# Patient Record
Sex: FEMALE | Race: WHITE | NOT HISPANIC OR LATINO | Employment: OTHER | URBAN - METROPOLITAN AREA
[De-identification: names, ages, dates, MRNs, and addresses within clinical notes are randomized per-mention and may not be internally consistent; named-entity substitution may affect disease eponyms.]

---

## 2017-05-17 ENCOUNTER — OFFICE VISIT (OUTPATIENT)
Dept: INTERNAL MEDICINE | Facility: IMAGING CENTER | Age: 72
End: 2017-05-17
Payer: COMMERCIAL

## 2017-05-17 VITALS
HEART RATE: 67 BPM | DIASTOLIC BLOOD PRESSURE: 60 MMHG | HEIGHT: 63 IN | TEMPERATURE: 98.4 F | SYSTOLIC BLOOD PRESSURE: 110 MMHG | RESPIRATION RATE: 14 BRPM | OXYGEN SATURATION: 96 % | WEIGHT: 130 LBS | BODY MASS INDEX: 23.04 KG/M2

## 2017-05-17 DIAGNOSIS — Z78.0 POSTMENOPAUSAL: ICD-10-CM

## 2017-05-17 DIAGNOSIS — M85.89 OSTEOPENIA OF MULTIPLE SITES: ICD-10-CM

## 2017-05-17 DIAGNOSIS — J30.1 SEASONAL ALLERGIC RHINITIS DUE TO POLLEN: ICD-10-CM

## 2017-05-17 DIAGNOSIS — M19.90 ARTHRITIS: ICD-10-CM

## 2017-05-17 DIAGNOSIS — K75.4 AUTOIMMUNE HEPATITIS (HCC): Chronic | ICD-10-CM

## 2017-05-17 DIAGNOSIS — E03.9 ACQUIRED HYPOTHYROIDISM: Chronic | ICD-10-CM

## 2017-05-17 DIAGNOSIS — R53.83 FATIGUE, UNSPECIFIED TYPE: ICD-10-CM

## 2017-05-17 DIAGNOSIS — E55.9 VITAMIN D DEFICIENCY: Chronic | ICD-10-CM

## 2017-05-17 DIAGNOSIS — Z28.39 BEHIND ON IMMUNIZATIONS: ICD-10-CM

## 2017-05-17 DIAGNOSIS — Z28.03: ICD-10-CM

## 2017-05-17 PROCEDURE — 99205 OFFICE O/P NEW HI 60 MIN: CPT | Performed by: FAMILY MEDICINE

## 2017-05-17 RX ORDER — ESTRADIOL 1 MG/1
1 TABLET ORAL DAILY
COMMUNITY
End: 2017-05-17 | Stop reason: SDUPTHER

## 2017-05-17 RX ORDER — ESTRADIOL 1 MG/1
1 TABLET ORAL DAILY
Qty: 30 TAB | Refills: 1 | Status: SHIPPED | OUTPATIENT
Start: 2017-05-17 | End: 2018-05-10 | Stop reason: SDUPTHER

## 2017-05-17 RX ORDER — MONTELUKAST SODIUM 10 MG/1
10 TABLET ORAL DAILY
Qty: 90 TAB | Refills: 4 | Status: SHIPPED | OUTPATIENT
Start: 2017-05-17 | End: 2018-05-10 | Stop reason: SDUPTHER

## 2017-05-17 RX ORDER — ESTRADIOL 1 MG/1
1 TABLET ORAL DAILY
Qty: 90 TAB | Refills: 4 | Status: SHIPPED | OUTPATIENT
Start: 2017-05-17 | End: 2017-05-17 | Stop reason: SDUPTHER

## 2017-05-17 NOTE — MR AVS SNAPSHOT
"        Angela Davis   2017 11:00 AM   Office Visit   MRN: [id number]    Department:  [facility]   Dept Phone:  [phone]    Description:  Female : 1945   Provider:  Enedelia Bourne M.D.           Reason for Visit     Establish Care           Allergies as of 2017     Allergen Noted Reactions    Statins [Hmg-Coa-R Inhibitors] 2008       Autoimmune hepatitis    Codeine 2013       Hydrocodone 2013       Librium 2013       Lopressor [Kdc:Ci Pigment Blue 63+Metoprolol] 2015       Does not tolerate beta blockers    Sulphadimidine Sodium [Sulfamethazine Sodium] 2013         You were diagnosed with     Osteopenia of multiple sites   [9977526]       Vitamin D deficiency   [9320804]       Acquired hypothyroidism   [7114622]       Seasonal allergic rhinitis due to pollen   [9165964]       Behind on immunizations   [830889]       Fatigue, unspecified type   [4556756]         Vital Signs     Blood Pressure Pulse Temperature Respirations Height Weight    110/60 mmHg 67 36.9 °C (98.4 °F) 14 1.6 m (5' 3\") 58.968 kg (130 lb)    Body Mass Index Oxygen Saturation Smoking Status             23.03 kg/m2 96% Never Smoker          Basic Information     Date Of Birth Sex Race Ethnicity Preferred Language    1945 Female White Non- English      Your appointments     2017 10:00 AM   FOLLOW UP with Dayami Mtz M.D.   [facility] (--)    [address] [place] [address] [address]  [facility] [id number] [phone]              Problem List              ICD-10-CM Priority Class Noted - Resolved    Paroxysmal supraventricular tachycardia, details unknow - dx 3-5 y ago I47.1   2013 - Present    Other chest pain R07.89   2013 - Present    Autoimmune hepatitis (CMS-HCC) K75.4   10/10/2014 - Present    Mitral regurgitation I34.0   10/10/2014 - Present    Osteopenia of multiple sites M85.89   2017 - Present   " Vitamin D deficiency (Chronic) E55.9   5/17/2017 - Present    Acquired hypothyroidism (Chronic) E03.9   5/17/2017 - Present    Seasonal allergic rhinitis due to pollen J30.1   5/17/2017 - Present      Health Maintenance        Date Due Completion Dates    IMM DTaP/Tdap/Td Vaccine (1 - Tdap) 9/13/1965 ---    PAP SMEAR 9/13/1967 ---    COLONOSCOPY 9/13/1996 ---    IMM ZOSTER VACCINE 9/13/2006 ---    BONE DENSITY 9/13/2011 6/17/2005    IMM PNEUMOCOCCAL 65+ (ADULT) LOW/MEDIUM RISK SERIES (2 of 2 - PPSV23) 9/16/2016 9/16/2015    MAMMOGRAM 7/18/2017 7/18/2016, 1/30/2007, 1/30/2007, 12/7/2005            Current Immunizations     13-VALENT PCV PREVNAR 9/16/2015    Influenza Vaccine Adult HD 10/12/2016      Below and/or attached are the medications your provider expects you to take. Review all of your home medications and newly ordered medications with your provider and/or pharmacist. Follow medication instructions as directed by your provider and/or pharmacist. Please keep your medication list with you and share with your provider. Update the information when medications are discontinued, doses are changed, or new medications (including over-the-counter products) are added; and carry medication information at all times in the event of emergency situations     Allergies:  STATINS - (reactions not documented)     CODEINE - (reactions not documented)     HYDROCODONE - (reactions not documented)     LIBRIUM - (reactions not documented)     LOPRESSOR - (reactions not documented)     SULPHADIMIDINE SODIUM - (reactions not documented)               Medications  Valid as of: May 17, 2017 - 11:57 AM    Generic Name Brand Name Tablet Size Instructions for use    Aspirin (Tablet Delayed Response) ECOTRIN 81 MG Take 81 mg by mouth every day.        AzaTHIOprine (Tab) Azathioprine 75 MG Take  by mouth every day.        AzaTHIOprine (Tab) IMURAN 50 MG         Calcium Carb-Cholecalciferol (Tab) Calcium Carb-Cholecalciferol 1000-800  MG-UNIT Take 1 Tab by mouth every day.        Diclofenac Sodium (Gel) VOLTAREN 1 %         Estriol Micronized   1 mg by Does not apply route.        Fexofenadine HCl (Tab) ALLEGRA 180 MG Take 180 mg by mouth every day.        Levothyroxine Sodium (Tab) SYNTHROID 125 MCG Take 125 mcg by mouth every day.        Lisinopril (Tab) PRINIVIL 5 MG TAKE 1 TAB BY MOUTH EVERY EVENING.        Montelukast Sodium (Tab) SINGULAIR 10 MG Take 10 mg by mouth every day.        Omega-3 Fatty Acids (Cap) OMEGA 3 FA 1000 MG Take 1,000 mg by mouth 2 Times a Day.        .                 Medicines prescribed today were sent to:     [id number] - [place], [place] - [address]    [address] [place] [address]    Phone: [phone] Fax: [phone]    Open 24 Hours?: No      Medication refill instructions:       If your prescription bottle indicates you have medication refills left, it is not necessary to call your provider’s office. Please contact your pharmacy and they will refill your medication.    If your prescription bottle indicates you do not have any refills left, you may request refills at any time through one of the following ways: The online SongFlame system (except Urgent Care), by calling your provider’s office, or by asking your pharmacy to contact your provider’s office with a refill request. Medication refills are processed only during regular business hours and may not be available until the next business day. Your provider may request additional information or to have a follow-up visit with you prior to refilling your medication.   *Please Note: Medication refills are assigned a new Rx number when refilled electronically. Your pharmacy may indicate that no refills were authorized even though a new prescription for the same medication is available at the pharmacy. Please request the medicine by name with the pharmacy before contacting your provider for a refill.        Your To Do List     Future  "Labs/Procedures Complete By Expires    IONIZED CALCIUM  As directed 5/17/2018    MAGNESIUM  As directed 5/17/2018    T3 FREE  As directed 5/17/2018    TSH WITH REFLEX TO FT4  As directed 5/17/2018    VITAMIN B12  As directed 5/17/2018    VITAMIN D,25 HYDROXY  As directed 5/17/2018      Other Notes About Your Plan     Goes by \"She\"  2008 - Autoimmune hepatitis dx secondary to statin therapy.           [employer] Access Code: Activation code not generated  Current Corvisa[employer] Status: Active          "

## 2017-05-17 NOTE — PROGRESS NOTES
Chief Complaint   Patient presents with   • Establish Care       HPI:  Patient is a 71 y.o. female established patient who presents today to [facility]. She was previously cared for by Dr. Giovanny Rodriguez for her primary care needs. Pt has chronic autoimmune hepatitis induced by 3 months of statin therapy diagnosed in 2008/ followed by Dr. Owen/ on chronic imuran treatment with CBC/CMP/Hepatic panel lab draw pending. Pt has chronic mitral valve regurgitation/rate SVT followed by Dr. GREG Mtz. She also has chronic osteopenia (last dexa done in Dr. Rodriguez's office 2-3 yrs ago) and chronic hypothyroidism (last TSH 0.317) 6/9/16 with dosage change made at that time but no repeat labs done. Pt cannot call the last time she has had labs drawn for Vitamin D/B12/Mg. She no longer has lipid panels done due to her intolerance to statin therapy (familial CAD/hyperlipidemia hx). She is due for her mammogram at [facility] in 7/17 and colonoscopy is UTD. Pt needs Tdap but reports completing pneumonia vaccines in the past. She is aware of her yearly Medicare annual wellness visit benefit but finds no value to it. She reports that she constantly feels cold/has low energy/convinced it is related to her thyroid disorder not being well controlled.  Pt is here with her  at today's visit, and he supports her concerns.     Patient Active Problem List    Diagnosis Date Noted   • Osteopenia of multiple sites 05/17/2017   • Vitamin D deficiency 05/17/2017   • Acquired hypothyroidism 05/17/2017   • Seasonal allergic rhinitis due to pollen 05/17/2017   • Immunization not carried out because of immune compromised state 05/17/2017   • Autoimmune hepatitis (CMS-HCC) 10/10/2014   • Mitral regurgitation 10/10/2014   • Paroxysmal supraventricular tachycardia, details unknow - dx 3-5 y ago 04/19/2013     Past medical, surgical, family, and social history was reviewed and updated in Epic chart by me today.     Medications and allergies reviewed  "and updated in Epic chart by me today.     All previous records provided have been reviewed today.     ROS:  Pertinent positives listed above in HPI. All other systems have been reviewed and are negative.    PE:   /60 mmHg  Pulse 67  Temp(Src) 36.9 °C (98.4 °F)  Resp 14  Ht 1.6 m (5' 3\")  Wt 58.968 kg (130 lb)  BMI 23.03 kg/m2  SpO2 96%  Vital signs reviewed with patient.     Gen: Well developed; well nourished; no acute distress; age appropriate appearance   HEENT: Normocephalic; atraumatic; PEERLA b/l; sclera clear b/l; b/l external auditory canals WNL;R cerumen noted; b/l TM WNL; oropharynx clear; oral mucosa moist; tongue midline; dentition adequate   Neck: No adenopathy; no thyromegaly  CV: Regular rate and rhythm; S1/ S2 present; no murmur, gallop or rub noted  Pulm: No respiratory distress; clear to ascultation b/l; no wheezing or stridor noted b/l  Abd: Adequate bowel sounds noted; soft and nontender; no rebound, rigidity, nor distention  Extremities: No peripheral edema b/l LE extremities/ no clubbing nor cyanosis noted  Skin: Warm and dry; no rashes noted   Neuro: No focal deficits noted   Psych: AAOx4; mood and affect are appropriate    A/P:  1. Osteopenia of multiple sites  Pt reports having Dexa scan within the last 2-3 years at Dr. Rodriguez's office - will obtain records from them.    2. Chronic vitamin D deficiency  Stability unknown/ pt takes daily vitamin D supplementation/ will check baseline labs  - VITAMIN D,25 HYDROXY; Future  - IONIZED CALCIUM; Future    3. Chronic acquired hypothyroidism  Pt remains symptomatic despite taking Synthroid daily/ will check baseline labs  - TSH WITH REFLEX TO FT4; Future  - T3 FREE; Future    4. Seasonal allergic rhinitis due to pollen  Stable/ well controlled on daily singulair - refill requested through mail order  - montelukast (SINGULAIR) 10 MG Tab; Take 1 Tab by mouth every day. Indications: Hayfever  Dispense: 90 Tab; Refill: 4    5. Behind on " immunizations  Tdap due - RX given for pt to obtain at pharmacy due to insurance    6. Fatigue, unspecified type  Unknown etiology - will check mg and B12 levels  - MAGNESIUM; Future  - VITAMIN B12; Future    7. Chronic autoimmune hepatitis (CMS-HCC)  Stable/ pt on daily imuran tx/ followed closely by Dr. Owen - labs pending from his office    8. Immunization not carried out because of immune compromised state  Pt is not candidate for zoster vaccine    9. Postmenopausal  Stable/ pt to continue daily estrace - refill requested both through mail order and local pharmacy  - estradiol (ESTRACE) 1 MG Tab; Take 1 Tab by mouth every day.  Dispense: 30 Tab; Refill: 1    10. Chronic osteoarthritis  Stable/ controlled with BID diclofenac therapy - refill requested through mail order  - Diclofenac Sodium 1 % Gel; Apply to affected areas of skin BID PRN arthritis pain  Dispense: 600 g; Refill: 4    11. Health Care Maintenance  Pt is to call C for mammogram due after 7/18/17.     Face to face time: 65 minutes with greater than 50% of time spent with direct medical care and counseling  Pt is to be seen for Medicare exam before the end of the year/ PRN sooner if current condition changes. I will f/u with her regarding lab results when available.

## 2017-05-27 LAB
25(OH)D3+25(OH)D2 SERPL-MCNC: 44.5 NG/ML (ref 30–100)
CA-I SERPL ISE-MCNC: 5.1 MG/DL (ref 4.5–5.6)
CALCIUM SERPL-MCNC: 9.9 MG/DL (ref 8.7–10.3)
MAGNESIUM SERPL-MCNC: 2.4 MG/DL (ref 1.6–2.3)
T3FREE SERPL-MCNC: 3 PG/ML (ref 2–4.4)
T4 FREE SERPL-MCNC: 1.67 NG/DL (ref 0.82–1.77)
TSH SERPL DL<=0.005 MIU/L-ACNC: 2.53 UIU/ML (ref 0.45–4.5)
VIT B12 SERPL-MCNC: 173 PG/ML (ref 211–946)

## 2017-05-30 ENCOUNTER — NON-PROVIDER VISIT (OUTPATIENT)
Dept: INTERNAL MEDICINE | Facility: IMAGING CENTER | Age: 72
End: 2017-05-30
Payer: COMMERCIAL

## 2017-05-30 DIAGNOSIS — E53.8 B12 DEFICIENCY: ICD-10-CM

## 2017-05-30 PROCEDURE — 96372 THER/PROPH/DIAG INJ SC/IM: CPT | Performed by: FAMILY MEDICINE

## 2017-05-30 RX ORDER — CYANOCOBALAMIN 1000 UG/ML
1000 INJECTION, SOLUTION INTRAMUSCULAR; SUBCUTANEOUS ONCE
Status: COMPLETED | OUTPATIENT
Start: 2017-06-02 | End: 2017-06-01

## 2017-05-30 RX ORDER — CYANOCOBALAMIN 1000 UG/ML
1000 INJECTION, SOLUTION INTRAMUSCULAR; SUBCUTANEOUS ONCE
Status: COMPLETED | OUTPATIENT
Start: 2017-05-30 | End: 2017-05-30

## 2017-05-30 RX ADMIN — CYANOCOBALAMIN 1000 MCG: 1000 INJECTION, SOLUTION INTRAMUSCULAR; SUBCUTANEOUS at 14:22

## 2017-06-01 ENCOUNTER — NON-PROVIDER VISIT (OUTPATIENT)
Dept: INTERNAL MEDICINE | Facility: IMAGING CENTER | Age: 72
End: 2017-06-01
Payer: COMMERCIAL

## 2017-06-01 DIAGNOSIS — E53.8 B12 DEFICIENCY: ICD-10-CM

## 2017-06-01 PROCEDURE — 96372 THER/PROPH/DIAG INJ SC/IM: CPT | Performed by: FAMILY MEDICINE

## 2017-06-01 RX ADMIN — CYANOCOBALAMIN 1000 MCG: 1000 INJECTION, SOLUTION INTRAMUSCULAR; SUBCUTANEOUS at 13:46

## 2017-06-05 ENCOUNTER — OFFICE VISIT (OUTPATIENT)
Dept: CARDIOLOGY | Facility: MEDICAL CENTER | Age: 72
End: 2017-06-05
Payer: COMMERCIAL

## 2017-06-05 VITALS
OXYGEN SATURATION: 93 % | SYSTOLIC BLOOD PRESSURE: 116 MMHG | WEIGHT: 131 LBS | BODY MASS INDEX: 23.21 KG/M2 | HEART RATE: 66 BPM | HEIGHT: 63 IN | DIASTOLIC BLOOD PRESSURE: 68 MMHG

## 2017-06-05 DIAGNOSIS — I47.10 PAROXYSMAL SUPRAVENTRICULAR TACHYCARDIA: Chronic | ICD-10-CM

## 2017-06-05 DIAGNOSIS — I34.0 NON-RHEUMATIC MITRAL REGURGITATION: Chronic | ICD-10-CM

## 2017-06-05 PROCEDURE — 99214 OFFICE O/P EST MOD 30 MIN: CPT | Performed by: INTERNAL MEDICINE

## 2017-06-05 ASSESSMENT — ENCOUNTER SYMPTOMS
RESPIRATORY NEGATIVE: 1
SHORTNESS OF BREATH: 0
PALPITATIONS: 0
DIZZINESS: 0
BRUISES/BLEEDS EASILY: 0
WEIGHT LOSS: 0
NERVOUS/ANXIOUS: 0
NAUSEA: 0
HEARTBURN: 0
EYES NEGATIVE: 1
LOSS OF CONSCIOUSNESS: 0
INSOMNIA: 0
MUSCULOSKELETAL NEGATIVE: 1
COUGH: 0

## 2017-06-05 NOTE — PROGRESS NOTES
Subjective:   Angela Davis is a 71 y.o. female who presents today in follow-up in regards to her mitral valve disease and remote SVT    Worries about her , he has heart failure and coronary disease. Recent stenting    Healthy and active but still gets chest heaviness with anxiety. This comes and goes, is mild but worse with the stress she has been under. It is not exertional or positional. Locations are very compliant    She has had good cholesterol never been on a statin with her autoimmune hepatitis  No palpitations    Past Medical History   Diagnosis Date   • Paroxysmal supraventricular tachycardia, details unknow - dx 3-5 y ago 4/19/2013   • Other chest pain 4/19/2013   • Fatigue 5/22/2013   • Congenital malrotation of intestine      Past Surgical History   Procedure Laterality Date   • Abdominal hysterectomy total  1970   • Appendectomy  1950   • Tonsillectomy  1954   • Lumpectomy  1982     right breast   • Hernia repair  1970     umbilical   • Primary c section  1968/1970   • Other abdominal surgery  1965-66     congenital malrotation of intestines     Family History   Problem Relation Age of Onset   • Heart Disease Mother    • Cancer Mother    • Heart Disease Father    • Cancer Father    • Heart Disease Sister    • Heart Attack Sister    • Heart Disease Brother    • Heart Attack Brother    • Heart Disease Brother    • Heart Attack Brother    • Diabetes Child    • Psychiatry Child      hodgkins survivor     History   Smoking status   • Never Smoker    Smokeless tobacco   • Never Used     Allergies   Allergen Reactions   • Statins [Hmg-Coa-R Inhibitors]      Autoimmune hepatitis   • Codeine    • Hydrocodone    • Librium    • Lopressor [Kdc:Ci Pigment Blue 63+Metoprolol]      Does not tolerate beta blockers   • Sulphadimidine Sodium [Sulfamethazine Sodium]      Outpatient Encounter Prescriptions as of 6/5/2017   Medication Sig Dispense Refill   • cyanocobalamin (VITAMIN B12) 1000 MCG Tab Take 1  "Tab by mouth every day. 30 Tab 6   • Diclofenac Sodium 1 % Gel Apply to affected areas of skin BID PRN arthritis pain 600 g 4   • montelukast (SINGULAIR) 10 MG Tab Take 1 Tab by mouth every day. Indications: Hayfever 90 Tab 4   • estradiol (ESTRACE) 1 MG Tab Take 1 Tab by mouth every day. 30 Tab 1   • lisinopril (PRINIVIL) 5 MG Tab TAKE 1 TAB BY MOUTH EVERY EVENING. 90 Tab 3   • Azathioprine 75 MG TABS Take 75 mg by mouth every day.     • fexofenadine (ALLEGRA) 180 MG tablet Take 180 mg by mouth every day. Indications: Hayfever     • aspirin EC (ECOTRIN) 81 MG TBEC Take 81 mg by mouth every day.     • Calcium Carb-Cholecalciferol (CALCIUM 1000 + D) 1000-800 MG-UNIT TABS Take 1 Tab by mouth every day.     • docosahexanoic acid (OMEGA 3 FA) 1000 MG CAPS Take 1,000 mg by mouth 2 Times a Day.     • levothyroxine (SYNTHROID) 125 MCG TABS Take 125 mcg by mouth every day.       No facility-administered encounter medications on file as of 6/5/2017.     Review of Systems   Constitutional: Negative for weight loss and malaise/fatigue.   Eyes: Negative.    Respiratory: Negative.  Negative for cough and shortness of breath.    Cardiovascular: Negative for palpitations and leg swelling.   Gastrointestinal: Negative for heartburn and nausea.   Musculoskeletal: Negative.    Neurological: Negative for dizziness and loss of consciousness.   Endo/Heme/Allergies: Does not bruise/bleed easily.   Psychiatric/Behavioral: The patient is not nervous/anxious and does not have insomnia.    All other systems reviewed and are negative.       Objective:   /68 mmHg  Pulse 66  Ht 1.6 m (5' 3\")  Wt 59.421 kg (131 lb)  BMI 23.21 kg/m2  SpO2 93%    Physical Exam   Constitutional: She is oriented to person, place, and time. She appears well-developed and well-nourished. No distress.   Eyes: EOM are normal. Pupils are equal, round, and reactive to light.   Neck: Neck supple. No JVD present. No thyroid mass and no thyromegaly present. "   Cardiovascular: Normal rate, regular rhythm, S1 normal, S2 normal and normal pulses.  PMI is not displaced.    Murmur (1 out of 6, at the apex, systolic blowing) heard.  Pulses:       Carotid pulses are 2+ on the right side, and 2+ on the left side.       Radial pulses are 2+ on the right side, and 2+ on the left side.   No peripheral edema.   Pulmonary/Chest: Effort normal and breath sounds normal. No respiratory distress.   Abdominal: Normal appearance and bowel sounds are normal. She exhibits no abdominal bruit. There is no hepatosplenomegaly.   Musculoskeletal: Normal range of motion. She exhibits no edema or tenderness.        Lumbar back: She exhibits no tenderness and no spasm.   Neurological: She is alert and oriented to person, place, and time. She has normal strength. No cranial nerve deficit. She exhibits normal muscle tone.   Skin: Skin is warm and dry. No rash noted. No cyanosis. Nails show no clubbing.   Psychiatric: She has a normal mood and affect. Her behavior is normal.       Assessment:     1. Paroxysmal supraventricular tachycardia, details unknow - dx 3-5 y ago  Echocardiogram Comp w/o Cont   2. Non-rheumatic mitral regurgitation  Echo-Rest/Stress w/o Contrast    Echocardiogram Comp w/o Cont       Medical Decision Making:  Today's Assessment / Status / Plan:     Valvular heart disease, mitral valve reviewed, echo 2015. Stable. Were due to repeat this, I do not think she is symptomatically this. Highly functional    Chest pain, this does concern me. Stress echo in 2015 for similar reasons was normal and reassuring. We talked at length about repeating this, if it is high risk for she's continuously symptomatic we talked about coronary angiography and possible percutaneous intervention afforded. She voices understanding. Again she does not take statins    Lipids reviewed, she follows this with a new primary care doctor. She feels that she is in very good hands. Blood pressure is excellent, good  afterload reduction with her ACE inhibitor    RTC 6 months or sooner based on testing

## 2017-06-05 NOTE — MR AVS SNAPSHOT
"        Angela Conwayarvind   2017 10:00 AM   Office Visit   MRN: [id number]    Department:  [facility]   Dept Phone:  [phone]    Description:  Female : 1945   Provider:  Dayami Mtz M.D.           Reason for Visit     Follow-Up           Allergies as of 2017     Allergen Noted Reactions    Statins [Hmg-Coa-R Inhibitors] 2008       Autoimmune hepatitis    Codeine 2013       Hydrocodone 2013       Librium 2013       Lopressor [Kdc:Ci Pigment Blue 63+Metoprolol] 2015       Does not tolerate beta blockers    Sulphadimidine Sodium [Sulfamethazine Sodium] 2013         You were diagnosed with     Paroxysmal supraventricular tachycardia (CMS-HCC)   [427.0.ICD-9-CM]       Non-rheumatic mitral regurgitation   [624113]         Vital Signs     Blood Pressure Pulse Height Weight Body Mass Index Oxygen Saturation    116/68 mmHg 66 1.6 m (5' 3\") 59.421 kg (131 lb) 23.21 kg/m2 93%    Smoking Status                   Never Smoker            Basic Information     Date Of Birth Sex Race Ethnicity Preferred Language    1945 Female White Non- English      Problem List              ICD-10-CM Priority Class Noted - Resolved    Paroxysmal supraventricular tachycardia, details unknow - dx 3-5 y ago (Chronic) I47.1   2013 - Present    Autoimmune hepatitis (CMS-HCC) (Chronic) K75.4   10/10/2014 - Present    Mitral regurgitation (Chronic) I34.0   10/10/2014 - Present    Osteopenia of multiple sites (Chronic) M85.89   2017 - Present    Vitamin D deficiency (Chronic) E55.9   2017 - Present    Acquired hypothyroidism (Chronic) E03.9   2017 - Present    Seasonal allergic rhinitis due to pollen (Chronic) J30.1   2017 - Present    Immunization not carried out because of immune compromised state Z28.03   2017 - Present    B12 deficiency E53.8   2017 - Present      Health Maintenance        Date Due Completion Dates    IMM " DTaP/Tdap/Td Vaccine (1 - Tdap) 9/13/1965 ---    BONE DENSITY 9/13/2011 6/17/2005    IMM ZOSTER VACCINE 5/1/2020 (Originally 9/13/2006) ---    MAMMOGRAM 7/18/2017 7/18/2016, 1/30/2007, 1/30/2007, 12/7/2005    COLONOSCOPY 2/17/2021 2/17/2016            Current Immunizations     13-VALENT PCV PREVNAR 9/16/2015    Influenza Vaccine Adult HD 10/12/2016    Pneumococcal polysaccharide vaccine (PPSV-23) 5/1/2014      Below and/or attached are the medications your provider expects you to take. Review all of your home medications and newly ordered medications with your provider and/or pharmacist. Follow medication instructions as directed by your provider and/or pharmacist. Please keep your medication list with you and share with your provider. Update the information when medications are discontinued, doses are changed, or new medications (including over-the-counter products) are added; and carry medication information at all times in the event of emergency situations     Allergies:  STATINS - (reactions not documented)     CODEINE - (reactions not documented)     HYDROCODONE - (reactions not documented)     LIBRIUM - (reactions not documented)     LOPRESSOR - (reactions not documented)     SULPHADIMIDINE SODIUM - (reactions not documented)               Medications  Valid as of: June 05, 2017 - 10:23 AM    Generic Name Brand Name Tablet Size Instructions for use    Aspirin (Tablet Delayed Response) ECOTRIN 81 MG Take 81 mg by mouth every day.        AzaTHIOprine (Tab) Azathioprine 75 MG Take 75 mg by mouth every day.        Calcium Carb-Cholecalciferol (Tab) Calcium Carb-Cholecalciferol 1000-800 MG-UNIT Take 1 Tab by mouth every day.        Cyanocobalamin (Tab) VITAMIN B12 1000 MCG Take 1 Tab by mouth every day.        Diclofenac Sodium (Gel) Diclofenac Sodium 1 % Apply to affected areas of skin BID PRN arthritis pain        Estradiol (Tab) ESTRACE 1 MG Take 1 Tab by mouth every day.        Fexofenadine HCl (Tab) ALLEGRA  180 MG Take 180 mg by mouth every day. Indications: Hayfever        Levothyroxine Sodium (Tab) SYNTHROID 125 MCG Take 125 mcg by mouth every day.        Lisinopril (Tab) PRINIVIL 5 MG TAKE 1 TAB BY MOUTH EVERY EVENING.        Montelukast Sodium (Tab) SINGULAIR 10 MG Take 1 Tab by mouth every day. Indications: Hayfever        Omega-3 Fatty Acids (Cap) OMEGA 3 FA 1000 MG Take 1,000 mg by mouth 2 Times a Day.        .                 Medicines prescribed today were sent to:     [id number] - [place], [place] - [address] [facility] [place], [place]    [address] [facility] [place] [address]    Phone: [phone] Fax: [phone]    Open 24 Hours?: No    [facility] - [place], [place] - [address] AT [address]AL TO [facility] [place]    [address] [facility] [address]    Phone: [phone] Fax: [phone]    Open 24 Hours?: No      Medication refill instructions:       If your prescription bottle indicates you have medication refills left, it is not necessary to call your provider’s office. Please contact your pharmacy and they will refill your medication.    If your prescription bottle indicates you do not have any refills left, you may request refills at any time through one of the following ways: The online YouHelp system (except Urgent Care), by calling your provider’s office, or by asking your pharmacy to contact your provider’s office with a refill request. Medication refills are processed only during regular business hours and may not be available until the next business day. Your provider may request additional information or to have a follow-up visit with you prior to refilling your medication.   *Please Note: Medication refills are assigned a new Rx number when refilled electronically. Your pharmacy may indicate that no refills were authorized even though a new prescription for the same medication is available at the pharmacy. Please request the medicine by name with the pharmacy before contacting your  "provider for a refill.        Your To Do List     Future Labs/Procedures Complete By Expires    Echo-Rest/Stress w/o Contrast  As directed 6/5/2018    Echocardiogram Comp w/o Cont  As directed 6/5/2018      Other Notes About Your Plan     [facility] - Synthroid RX instructions scanned into media 5/17/17  Goes by \"She\"  2008 - Autoimmune hepatitis dx secondary to statin therapy - NO LIPID PANEL  Cardiology: Dr. GREG Mtz  GI: Dr. Brayden Oates Access Code: Activation code not generated  Current [employer] Status: Active          "

## 2017-07-25 ENCOUNTER — TELEPHONE (OUTPATIENT)
Dept: INTERNAL MEDICINE | Facility: IMAGING CENTER | Age: 72
End: 2017-07-25

## 2017-07-25 DIAGNOSIS — N63.10 BREAST MASS, RIGHT: ICD-10-CM

## 2017-07-25 NOTE — TELEPHONE ENCOUNTER
Spoke with patient on telephone regarding abnormal screening mammogram results. Will order diagnostic mammogram and R breast US at [facility].

## 2017-07-27 ENCOUNTER — HOSPITAL ENCOUNTER (OUTPATIENT)
Dept: CARDIOLOGY | Facility: MEDICAL CENTER | Age: 72
End: 2017-07-27
Attending: INTERNAL MEDICINE | Admitting: INTERNAL MEDICINE
Payer: COMMERCIAL

## 2017-07-27 ENCOUNTER — HOSPITAL ENCOUNTER (OUTPATIENT)
Dept: CARDIOLOGY | Facility: MEDICAL CENTER | Age: 72
End: 2017-07-27
Attending: INTERNAL MEDICINE
Payer: COMMERCIAL

## 2017-07-27 DIAGNOSIS — I34.0 NON-RHEUMATIC MITRAL REGURGITATION: ICD-10-CM

## 2017-07-27 DIAGNOSIS — I47.10 PAROXYSMAL SUPRAVENTRICULAR TACHYCARDIA: Chronic | ICD-10-CM

## 2017-07-27 DIAGNOSIS — I34.0 NON-RHEUMATIC MITRAL REGURGITATION: Chronic | ICD-10-CM

## 2017-07-27 LAB — LV EJECT FRACT  99904: 70

## 2017-07-27 PROCEDURE — 93306 TTE W/DOPPLER COMPLETE: CPT | Mod: 26,59 | Performed by: INTERNAL MEDICINE

## 2017-07-27 PROCEDURE — 93017 CV STRESS TEST TRACING ONLY: CPT

## 2017-07-27 PROCEDURE — 93018 CV STRESS TEST I&R ONLY: CPT | Performed by: INTERNAL MEDICINE

## 2017-07-27 PROCEDURE — 93350 STRESS TTE ONLY: CPT

## 2017-07-27 PROCEDURE — 93350 STRESS TTE ONLY: CPT | Mod: 26 | Performed by: INTERNAL MEDICINE

## 2017-07-27 PROCEDURE — 93306 TTE W/DOPPLER COMPLETE: CPT

## 2017-07-28 ENCOUNTER — TELEPHONE (OUTPATIENT)
Dept: CARDIOLOGY | Facility: MEDICAL CENTER | Age: 72
End: 2017-07-28

## 2017-07-28 LAB
LV EJECT FRACT  99904: 70
LV EJECT FRACT MOD 2C 99903: 83.8
LV EJECT FRACT MOD 4C 99902: 83.04
LV EJECT FRACT MOD BP 99901: 83.18

## 2017-07-29 NOTE — TELEPHONE ENCOUNTER
Called patient, advised her of her echocardiogram and stress echocardiogram results.    JNS RN      ==============================================================  Stress Echo Report    Echocardiography Laboratory  CONCLUSIONS  Negative stress echocardiogram for ischemia with adequate stress   achieved by heart rate criteria.   Excellent exercise tolerance, achieving 10.1 METS.    ERIC PARMAR  Age:    71    Gender:    F  MRN:    [id number]  :    1945  Exam Date:           2017  ==============================================================     Transthoracic  Echo Report      Echocardiography Laboratory    CONCLUSIONS  Prior echo 4-15-16. Compared to the images of the prior study done -    there has been no significant change.   Normal left ventricular systolic function.  Left ventricular ejection fraction is visually estimated to be 70%.  Grade I diastolic dysfunction.  Mild to moderate mitral regurgitation.  Mild aortic insufficiency.  Mild tricuspid regurgitation.  Estimated right ventricular systolic pressure is 35 mmHg.    ERIC PARMAR  Exam Date:         2017  ===============================================================

## 2017-08-23 ENCOUNTER — OFFICE VISIT (OUTPATIENT)
Dept: INTERNAL MEDICINE | Facility: IMAGING CENTER | Age: 72
End: 2017-08-23
Payer: COMMERCIAL

## 2017-08-23 VITALS
TEMPERATURE: 97.9 F | RESPIRATION RATE: 14 BRPM | SYSTOLIC BLOOD PRESSURE: 100 MMHG | HEIGHT: 64 IN | OXYGEN SATURATION: 97 % | HEART RATE: 71 BPM | WEIGHT: 130 LBS | BODY MASS INDEX: 22.2 KG/M2 | DIASTOLIC BLOOD PRESSURE: 60 MMHG

## 2017-08-23 DIAGNOSIS — E53.8 B12 DEFICIENCY: ICD-10-CM

## 2017-08-23 DIAGNOSIS — E55.9 VITAMIN D DEFICIENCY: Chronic | ICD-10-CM

## 2017-08-23 DIAGNOSIS — E28.39 ESTROGEN DEFICIENCY: ICD-10-CM

## 2017-08-23 DIAGNOSIS — I34.0 MITRAL VALVE INSUFFICIENCY, UNSPECIFIED ETIOLOGY: ICD-10-CM

## 2017-08-23 DIAGNOSIS — K75.4 AUTOIMMUNE HEPATITIS (HCC): Chronic | ICD-10-CM

## 2017-08-23 DIAGNOSIS — M85.89 OSTEOPENIA OF MULTIPLE SITES: Chronic | ICD-10-CM

## 2017-08-23 DIAGNOSIS — Z00.00 MEDICARE ANNUAL WELLNESS VISIT, SUBSEQUENT: ICD-10-CM

## 2017-08-23 DIAGNOSIS — E03.9 ACQUIRED HYPOTHYROIDISM: Chronic | ICD-10-CM

## 2017-08-23 DIAGNOSIS — Z28.39 BEHIND ON IMMUNIZATIONS: ICD-10-CM

## 2017-08-23 PROCEDURE — G0439 PPPS, SUBSEQ VISIT: HCPCS | Performed by: FAMILY MEDICINE

## 2017-08-23 RX ORDER — LISINOPRIL 5 MG/1
5 TABLET ORAL
Qty: 90 TAB | Refills: 3 | Status: SHIPPED | OUTPATIENT
Start: 2017-08-23 | End: 2018-02-12 | Stop reason: SDUPTHER

## 2017-08-23 RX ORDER — ACETAMINOPHEN 160 MG
1 TABLET,DISINTEGRATING ORAL DAILY
COMMUNITY
End: 2020-03-02

## 2017-08-23 ASSESSMENT — PATIENT HEALTH QUESTIONNAIRE - PHQ9: CLINICAL INTERPRETATION OF PHQ2 SCORE: 0

## 2017-08-23 NOTE — PROGRESS NOTES
CC:   Medicare Annual Wellness Visit    HPI:  Angela is a 71 y.o. Female here for Medicare Annual Wellness Visit with her  present today. She is doing well overall and has been focused on keeping him healthy recently. She has chronic osteopenia and is overdue for repeat Dexa scan. She is behind on her Tdap vaccination (has RX in possession from last visit with me) and intends to have it done when she obtains her flu shot this year. She has had some chest pain complaints with exertion follow closely by Dr. GREG Mtz - pt denies recent issues with this but states that the episodes only occur with exertion. She has chronic autoimmune hepatitis and has her last appointment with Dr. Owen in December - she is concerned about who is assuming her GI care at his office upon his USP and I encouraged her to contact him directly for that information. She has started taking daily oral B12 and does not feel need to have B12 level redrawn at this visit. She has chronic Vitamin D deficiency on daily supplementation and chronic hypothyroidism on daily levothyroxine (labs done in May stable). She denies any current complaints overall at today's visit.     Patient Active Problem List    Diagnosis Date Noted   • B12 deficiency 05/30/2017   • Osteopenia of multiple sites 05/17/2017   • Vitamin D deficiency 05/17/2017   • Acquired hypothyroidism 05/17/2017   • Seasonal allergic rhinitis due to pollen 05/17/2017   • Immunization not carried out because of immune compromised state 05/17/2017   • Autoimmune hepatitis (CMS-HCC) 10/10/2014   • Mitral regurgitation 10/10/2014   • Paroxysmal supraventricular tachycardia, details unknow - dx 3-5 y ago 04/19/2013     Current Outpatient Prescriptions   Medication Sig Dispense Refill   • lisinopril (PRINIVIL) 5 MG Tab Take 1 Tab by mouth every bedtime. 90 Tab 3   • Cholecalciferol (VITAMIN D3) 2000 UNIT Cap Take 1 Cap by mouth every day.     • cyanocobalamin (VITAMIN B12) 1000  MCG Tab Take 1 Tab by mouth every day. 30 Tab 6   • Diclofenac Sodium 1 % Gel Apply to affected areas of skin BID PRN arthritis pain 600 g 4   • estradiol (ESTRACE) 1 MG Tab Take 1 Tab by mouth every day. 30 Tab 1   • Azathioprine 75 MG TABS Take 75 mg by mouth every day.     • aspirin EC (ECOTRIN) 81 MG TBEC Take 81 mg by mouth every day.     • Calcium Carb-Cholecalciferol (CALCIUM 1000 + D) 1000-800 MG-UNIT TABS Take 1 Tab by mouth every day.     • docosahexanoic acid (OMEGA 3 FA) 1000 MG CAPS Take 1,000 mg by mouth 2 Times a Day.     • levothyroxine (SYNTHROID) 125 MCG TABS Take 125 mcg by mouth every day.     • montelukast (SINGULAIR) 10 MG Tab Take 1 Tab by mouth every day. Indications: Hayfever (Patient taking differently: Take 10 mg by mouth 1 time daily as needed. Indications: Hayfever) 90 Tab 4   • fexofenadine (ALLEGRA) 180 MG tablet Take 180 mg by mouth 1 time daily as needed. Indications: Hayfever       No current facility-administered medications for this visit.      Current supplements: see MAR  Chronic narcotic pain medicines: no  Allergies: Statins; Codeine; Hydrocodone; Librium; Lopressor; and Sulphadimidine sodium  Exercise: walks 30 minutes daily  Current social contact/activities: walks daily/ does not like social activities with crowds    Screening:  Depression Screening    Little interest or pleasure in doing things?  0 - not at all  Feeling down, depressed , or hopeless? 0 - not at all  Trouble falling or staying asleep, or sleeping too much?   no  Feeling tired or having little energy?   no  Poor appetite or overeating?   no  Feeling bad about yourself - or that you are a failure or have let yourself or your family down?  no  Trouble concentrating on things, such as reading the newspaper or watching television?  no  Moving or speaking so slowly that other people could have noticed.  Or the opposite - being so fidgety or restless that you have been moving around a lot more than usual?    no  Thoughts that you would be better off dead, or of hurting yourself?   no      Screening for Cognitive Impairment  Three Minute Recall (apple, watch, willi)  3/3    Draw clock face with all 12 numbers set to the hand to show 10 minutes past 11 o'clock  1      Fall Risk Assessment  Has the patient had two or more falls in the last year or any fall with injury in the last year?  No    Safety Assessment  Throw rugs on floor.  No  Handrails on all stairs.  Yes  Good lighting in all hallways.  Yes  Difficulty hearing.  No    Functional Assessment ADLs  Are there any barriers preventing you from cooking for yourself or meeting nutritional needs?  No.    Are there any barriers preventing you from driving safely or obtaining transportation?  No.    Are there any barriers preventing you from using a telephone or calling for help?  No.    Are there any barriers preventing you from shopping?  No.    Are there any barriers preventing you from taking care of your own finances?  No.    Are there any barriers preventing you from managing your medications?  No.    Are currently engaging any exercise or physical activity?  Yes.       Health Maintenance Summary                BONE DENSITY Overdue 9/13/2011      Done 6/17/2005 DS-BONE DENSITY STUDY (DEXA)    IMM INFLUENZA Next Due 9/1/2017      Done 10/12/2016 Imm Admin: Influenza Vaccine Adult HD    IMM DTaP/Tdap/Td Vaccine Postponed 7/2/2018 Originally 9/13/1965. Patient Refused    IMM ZOSTER VACCINE Postponed 5/1/2020 Originally 9/13/2006. Provider advises postponing for medical reasons    MAMMOGRAM Next Due 7/24/2018      Done 7/24/2017 [id number]     Patient has more history with this topic...    COLONOSCOPY Next Due 2/17/2021      Done 2/17/2016 REFERRAL TO GI FOR COLONOSCOPY        Patient Care Team:  Enedelia Bourne M.D. as PCP - General (Family Medicine)    Social History   Substance Use Topics   • Smoking status: Never Smoker    • Smokeless tobacco: Never  "Used   • Alcohol Use: No     Family History   Problem Relation Age of Onset   • Heart Disease Mother    • Cancer Mother    • Heart Disease Father    • Cancer Father    • Heart Disease Sister    • Heart Attack Sister    • Heart Disease Brother    • Heart Attack Brother    • Heart Disease Brother    • Heart Attack Brother    • Diabetes Child    • Psychiatry Child      hodgkins survivor     She  has a past medical history of Paroxysmal supraventricular tachycardia, details unknow - dx 3-5 y ago (4/19/2013); Other chest pain (4/19/2013); Fatigue (5/22/2013); Congenital malrotation of intestine; Thyroid disease; B12 deficiency; and Autoimmune hepatitis (CMS-HCC).   Past Surgical History   Procedure Laterality Date   • Abdominal hysterectomy total  1970   • Appendectomy  1950   • Tonsillectomy  1954   • Lumpectomy  1982     right breast   • Hernia repair  1970     umbilical   • Primary c section  1968/1970   • Other abdominal surgery  1965-66     congenital malrotation of intestines     ROS:    All positives noted in HPI. All others reviewed and are negative.    Ostomy or other tubes or amputations: no  Chronic oxygen use: no  Last eye exam: UTD  : denies incontinence  Gait: stable  Hearing: good  Dentition: adequate    Exam:   Blood pressure 100/60, pulse 71, temperature 36.6 °C (97.9 °F), resp. rate 14, height 1.626 m (5' 4\"), weight 58.968 kg (130 lb), SpO2 97 %. Body mass index is 22.3 kg/(m^2).    Gen: Well developed; well nourished; no acute distress; age appropriate appearance; wears glasses  HEENT: Normocephalic; atraumatic; PEERLA b/l; sclera clear b/l; b/l external auditory canals WNL; b/l TM WNL; oropharynx clear; oral mucosa moist; tongue midline; dentition adequate   Neck: No adenopathy; no thyromegaly  CV: Regular rate and rhythm; S1/ S2 present; no murmur, gallop or rub noted  Pulm: No respiratory distress; clear to ascultation b/l; no wheezing or stridor noted b/l  Abd: Adequate bowel sounds noted; soft " and nontender; no rebound, rigidity, nor distention  Extremities: No peripheral edema b/l LE extremities/ no clubbing nor cyanosis noted  Skin: Warm and dry; no rashes noted   Neuro: No focal deficits noted   Psych: AAOx4; mood and affect are appropriate    Assessment and Plan:  1. Chronic mitral valve insufficiency, unspecified etiology  Stable/ followed by Dr. GREG Mtz/ pt needs refill for ACE sent to [facility]. Excellent BP control noted.   - lisinopril (PRINIVIL) 5 MG Tab; Take 1 Tab by mouth every bedtime.  Dispense: 90 Tab; Refill: 3    2. Chronic acquired hypothyroidism  Stable/ pt to continue daily levothyroxine dose.     3. Chronic autoimmune hepatitis (CMS-HCC)  Stable/ due to prior statin use/ has last appointment with Dr. Owen in December    4. B12 deficiency  Pt to continue daily B12 oral supplementation - does not want repeat B12 level drawn today.     5. Chronic osteopenia of multiple sites  Overdue for dexa scan - order placed in system and pt provided with number to call to schedule scan.   - DS-BONE DENSITY STUDY (DEXA); Future    6. Chronic vitamin D deficiency  Stable/ pt to continue daily supplementation    7. Medicare annual wellness visit, subsequent  No new issues identified at our visit today.     8. Behind on immunizations  Pt has RX for Tdap from prior visit with me. She intends to obtain Tdap with her influenza vaccine soon.     9. Estrogen deficiency  - DS-BONE DENSITY STUDY (DEXA); Future     Services needed: no services needed at this time  Health Care Screening: recommendations as per orders if indicated.  Referrals offered: no referrals required today  Counseling provided today:  · Prevent falls and reduce trip hazards; Secure or remove rugs if present   · Maintain working fire alarm and carbon monoxide detectors   · Engage in regular physical activity daily and social activities weekly as tolerated     Pt is doing well overall and no new concerns identified. Pt was  cautioned to obtain immediate medical care if she experiences any further chest pain issues. I would like to see her in the Spring for labs/ PRN sooner if current condition changes.

## 2017-08-23 NOTE — MR AVS SNAPSHOT
"        Angela Conwayarvind   2017 9:00 AM   Office Visit   MRN: [id number]    Department:  [facility]   Dept Phone:  [phone]    Description:  Female : 1945   Provider:  Enedelia Bourne M.D.           Reason for Visit     Annual Exam           Allergies as of 2017     Allergen Noted Reactions    Statins [Hmg-Coa-R Inhibitors] 2008       Autoimmune hepatitis    Codeine 2013       Hydrocodone 2013       Librium 2013       Lopressor [Kdc:Ci Pigment Blue 63+Metoprolol] 2015       Does not tolerate beta blockers    Sulphadimidine Sodium [Sulfamethazine Sodium] 2013         You were diagnosed with     Mitral valve insufficiency, unspecified etiology   [6496072]       Acquired hypothyroidism   [7688884]       Autoimmune hepatitis (CMS-HCC)   [571.42.ICD-9-CM]       B12 deficiency   [273332]       Osteopenia of multiple sites   [1686046]       Vitamin D deficiency   [8139542]       Medicare annual wellness visit, subsequent   [891563]       Behind on immunizations   [081170]       Estrogen deficiency   [850720]         Vital Signs     Blood Pressure Pulse Temperature Respirations Height Weight    100/60 mmHg 71 36.6 °C (97.9 °F) 14 1.626 m (5' 4\") 58.968 kg (130 lb)    Body Mass Index Oxygen Saturation Smoking Status             22.30 kg/m2 97% Never Smoker          Basic Information     Date Of Birth Sex Race Ethnicity Preferred Language    1945 Female White Non- English      Your appointments     Dec 06, 2017 10:00 AM   FOLLOW UP with Dayami Mtz M.D.   [facility] (--)    [address], [address]  [place] [address]   [phone]              Problem List              ICD-10-CM Priority Class Noted - Resolved    Paroxysmal supraventricular tachycardia, details unknow - dx 3-5 y ago (Chronic) I47.1   2013 - Present    Autoimmune hepatitis (CMS-HCC) (Chronic) K75.4   10/10/2014 - " Present    Mitral regurgitation (Chronic) I34.0   10/10/2014 - Present    Osteopenia of multiple sites (Chronic) M85.89   5/17/2017 - Present    Vitamin D deficiency (Chronic) E55.9   5/17/2017 - Present    Acquired hypothyroidism (Chronic) E03.9   5/17/2017 - Present    Seasonal allergic rhinitis due to pollen (Chronic) J30.1   5/17/2017 - Present    Immunization not carried out because of immune compromised state Z28.03   5/17/2017 - Present    B12 deficiency E53.8   5/30/2017 - Present      Health Maintenance        Date Due Completion Dates    BONE DENSITY 9/13/2011 6/17/2005    IMM INFLUENZA (1) 9/1/2017 10/12/2016    IMM DTaP/Tdap/Td Vaccine (1 - Tdap) 7/2/2018 (Originally 9/13/1965) ---    IMM ZOSTER VACCINE 5/1/2020 (Originally 9/13/2006) ---    MAMMOGRAM 7/24/2018 7/24/2017, 7/18/2016, 1/30/2007, 1/30/2007, 12/7/2005    COLONOSCOPY 2/17/2021 2/17/2016            Current Immunizations     13-VALENT PCV PREVNAR 9/16/2015    Influenza Vaccine Adult HD 10/12/2016    Pneumococcal polysaccharide vaccine (PPSV-23) 5/1/2014      Below and/or attached are the medications your provider expects you to take. Review all of your home medications and newly ordered medications with your provider and/or pharmacist. Follow medication instructions as directed by your provider and/or pharmacist. Please keep your medication list with you and share with your provider. Update the information when medications are discontinued, doses are changed, or new medications (including over-the-counter products) are added; and carry medication information at all times in the event of emergency situations     Allergies:  STATINS - (reactions not documented)     CODEINE - (reactions not documented)     HYDROCODONE - (reactions not documented)     LIBRIUM - (reactions not documented)     LOPRESSOR - (reactions not documented)     SULPHADIMIDINE SODIUM - (reactions not documented)               Medications  Valid as of: August 23, 2017 -  2:22  PM    Generic Name Brand Name Tablet Size Instructions for use    Aspirin (Tablet Delayed Response) ECOTRIN 81 MG Take 81 mg by mouth every day.        AzaTHIOprine (Tab) Azathioprine 75 MG Take 75 mg by mouth every day.        Calcium Carb-Cholecalciferol (Tab) Calcium Carb-Cholecalciferol 1000-800 MG-UNIT Take 1 Tab by mouth every day.        Cholecalciferol (Cap) Vitamin D3 2000 UNIT Take 1 Cap by mouth every day.        Cyanocobalamin (Tab) VITAMIN B12 1000 MCG Take 1 Tab by mouth every day.        Diclofenac Sodium (Gel) Diclofenac Sodium 1 % Apply to affected areas of skin BID PRN arthritis pain        Estradiol (Tab) ESTRACE 1 MG Take 1 Tab by mouth every day.        Fexofenadine HCl (Tab) ALLEGRA 180 MG Take 180 mg by mouth 1 time daily as needed. Indications: Hayfever        Levothyroxine Sodium (Tab) SYNTHROID 125 MCG Take 125 mcg by mouth every day.        Lisinopril (Tab) PRINIVIL 5 MG Take 1 Tab by mouth every bedtime.        Montelukast Sodium (Tab) SINGULAIR 10 MG Take 1 Tab by mouth every day. Indications: Hayfever        Omega-3 Fatty Acids (Cap) OMEGA 3 FA 1000 MG Take 1,000 mg by mouth 2 Times a Day.        .                 Medicines prescribed today were sent to:     [facility] [place], [place] - [address] [facility]    [address] [facility] [address]    Phone: [phone] Fax: [phone]    Open 24 Hours?: No    [facility] - [place], [place] - [address] AT PORTAL TO [facility]    [address] [facility] [address]    Phone: [phone] Fax: [phone]    Open 24 Hours?: No      Medication refill instructions:       If your prescription bottle indicates you have medication refills left, it is not necessary to call your provider’s office. Please contact your pharmacy and they will refill your medication.    If your prescription bottle indicates you do not have any refills left, you may request refills at any time through one of the following ways:  "The online [employer] system (except Urgent Care), by calling your provider’s office, or by asking your pharmacy to contact your provider’s office with a refill request. Medication refills are processed only during regular business hours and may not be available until the next business day. Your provider may request additional information or to have a follow-up visit with you prior to refilling your medication.   *Please Note: Medication refills are assigned a new Rx number when refilled electronically. Your pharmacy may indicate that no refills were authorized even though a new prescription for the same medication is available at the pharmacy. Please request the medicine by name with the pharmacy before contacting your provider for a refill.        Your To Do List     Future Labs/Procedures Complete By Expires    DS-BONE DENSITY STUDY (DEXA)  As directed 8/23/2018      Other Notes About Your Plan     \"She\"   Durable Power of  scanned 08/17/2017                                [facility]   Synthroid RX instructions scanned into media 5/17/17 2008 - Autoimmune hepatitis dx secondary to statin therapy - NO LIPID PANEL; NO Shingles Vaccine  Cardiology: Dr. GREG Mtz  GI: Dr. Owen           [employer] Access Code: Activation code not generated  Current [employer]is[employer] Status: Active          "

## 2017-09-14 ENCOUNTER — HOSPITAL ENCOUNTER (OUTPATIENT)
Dept: RADIOLOGY | Facility: MEDICAL CENTER | Age: 72
End: 2017-09-14
Attending: FAMILY MEDICINE
Payer: COMMERCIAL

## 2017-09-14 DIAGNOSIS — E28.39 ESTROGEN DEFICIENCY: ICD-10-CM

## 2017-09-14 DIAGNOSIS — M85.89 OSTEOPENIA OF MULTIPLE SITES: Chronic | ICD-10-CM

## 2017-09-14 PROCEDURE — 77080 DXA BONE DENSITY AXIAL: CPT

## 2017-12-06 ENCOUNTER — OFFICE VISIT (OUTPATIENT)
Dept: CARDIOLOGY | Facility: MEDICAL CENTER | Age: 72
End: 2017-12-06
Payer: COMMERCIAL

## 2017-12-06 VITALS
HEART RATE: 88 BPM | WEIGHT: 131.2 LBS | DIASTOLIC BLOOD PRESSURE: 62 MMHG | BODY MASS INDEX: 22.4 KG/M2 | HEIGHT: 64 IN | SYSTOLIC BLOOD PRESSURE: 112 MMHG | OXYGEN SATURATION: 92 %

## 2017-12-06 DIAGNOSIS — I34.0 NON-RHEUMATIC MITRAL REGURGITATION: Chronic | ICD-10-CM

## 2017-12-06 DIAGNOSIS — I47.10 PAROXYSMAL SUPRAVENTRICULAR TACHYCARDIA: Chronic | ICD-10-CM

## 2017-12-06 PROCEDURE — 99214 OFFICE O/P EST MOD 30 MIN: CPT | Performed by: INTERNAL MEDICINE

## 2017-12-06 ASSESSMENT — ENCOUNTER SYMPTOMS
SHORTNESS OF BREATH: 0
EYES NEGATIVE: 1
NAUSEA: 0
INSOMNIA: 0
RESPIRATORY NEGATIVE: 1
NERVOUS/ANXIOUS: 0
BRUISES/BLEEDS EASILY: 0
PALPITATIONS: 0
COUGH: 0
DIZZINESS: 0
HEARTBURN: 0
MUSCULOSKELETAL NEGATIVE: 1
WEIGHT LOSS: 0
LOSS OF CONSCIOUSNESS: 0

## 2017-12-06 NOTE — PROGRESS NOTES
Subjective:   Angela Davis is a 71 y.o. female who presents today in follow-up in regards to her mitral valve disease and remote SVTAnd noncardiac chest pain    Worries about her , he has heart failure and coronary disease. Recent stenting, he is in with her today looking for a new cardiologist      Healthy and active but still gets chest heaviness with anxiety. Unchanged despite our testing last summer. He worries about her 2. This comes and goes, is mild but worse with the stress she has been under. It is not exertional or positional.     She has had good cholesterol never been on a statin with her autoimmune hepatitis  No palpitations    Past Medical History:   Diagnosis Date   • Autoimmune hepatitis (CMS-HCC)    • B12 deficiency    • Congenital malrotation of intestine    • Fatigue 5/22/2013   • Other chest pain 4/19/2013   • Paroxysmal supraventricular tachycardia, details unknow - dx 3-5 y ago 4/19/2013   • Thyroid disease      Past Surgical History:   Procedure Laterality Date   • LUMPECTOMY  1982    right breast   • ABDOMINAL HYSTERECTOMY TOTAL  1970   • HERNIA REPAIR  1970    umbilical   • TONSILLECTOMY  1954   • APPENDECTOMY  1950   • OTHER ABDOMINAL SURGERY  1965-66    congenital malrotation of intestines   • PRIMARY C SECTION  1968/1970     Family History   Problem Relation Age of Onset   • Heart Disease Mother    • Cancer Mother    • Heart Disease Father    • Cancer Father    • Heart Disease Sister    • Heart Attack Sister    • Heart Disease Brother    • Heart Attack Brother    • Heart Disease Brother    • Heart Attack Brother    • Diabetes Child    • Psychiatry Child      hodgkins survivor     History   Smoking Status   • Never Smoker   Smokeless Tobacco   • Never Used     Allergies   Allergen Reactions   • Statins [Hmg-Coa-R Inhibitors]      Autoimmune hepatitis   • Codeine    • Hydrocodone    • Librium    • Lopressor [Kdc:Ci Pigment Blue 63+Metoprolol]      Does not tolerate beta  blockers   • Sulphadimidine Sodium [Sulfamethazine Sodium]      Outpatient Encounter Prescriptions as of 12/6/2017   Medication Sig Dispense Refill   • lisinopril (PRINIVIL) 5 MG Tab Take 1 Tab by mouth every bedtime. 90 Tab 3   • Cholecalciferol (VITAMIN D3) 2000 UNIT Cap Take 1 Cap by mouth every day.     • cyanocobalamin (VITAMIN B12) 1000 MCG Tab Take 1 Tab by mouth every day. 30 Tab 6   • Diclofenac Sodium 1 % Gel Apply to affected areas of skin BID PRN arthritis pain 600 g 4   • montelukast (SINGULAIR) 10 MG Tab Take 1 Tab by mouth every day. Indications: Hayfever (Patient taking differently: Take 10 mg by mouth 1 time daily as needed. Indications: Hayfever) 90 Tab 4   • estradiol (ESTRACE) 1 MG Tab Take 1 Tab by mouth every day. 30 Tab 1   • Azathioprine 75 MG TABS Take 75 mg by mouth every day.     • fexofenadine (ALLEGRA) 180 MG tablet Take 180 mg by mouth 1 time daily as needed. Indications: Hayfever     • aspirin EC (ECOTRIN) 81 MG TBEC Take 81 mg by mouth every day.     • Calcium Carb-Cholecalciferol (CALCIUM 1000 + D) 1000-800 MG-UNIT TABS Take 1 Tab by mouth every day.     • docosahexanoic acid (OMEGA 3 FA) 1000 MG CAPS Take 1,000 mg by mouth 2 Times a Day.     • levothyroxine (SYNTHROID) 125 MCG TABS Take 125 mcg by mouth every day.       No facility-administered encounter medications on file as of 12/6/2017.      Review of Systems   Constitutional: Negative for malaise/fatigue and weight loss.   Eyes: Negative.    Respiratory: Negative.  Negative for cough and shortness of breath.    Cardiovascular: Negative for palpitations and leg swelling.   Gastrointestinal: Negative for heartburn and nausea.   Musculoskeletal: Negative.    Neurological: Negative for dizziness and loss of consciousness.   Endo/Heme/Allergies: Does not bruise/bleed easily.   Psychiatric/Behavioral: The patient is not nervous/anxious and does not have insomnia.    All other systems reviewed and are negative.       Objective:   BP  "112/62   Pulse 88   Ht 1.626 m (5' 4\")   Wt 59.5 kg (131 lb 3.2 oz)   SpO2 92%   BMI 22.52 kg/m²     Physical Exam   Constitutional: She is oriented to person, place, and time. She appears well-developed and well-nourished. No distress.   Eyes: EOM are normal. Pupils are equal, round, and reactive to light.   Neck: Neck supple. No JVD present. No thyroid mass and no thyromegaly present.   Cardiovascular: Normal rate, regular rhythm, S1 normal, S2 normal and normal pulses.  PMI is not displaced.    Murmur (1 out of 6, at the apex, systolic blowing) heard.  Pulses:       Carotid pulses are 2+ on the right side, and 2+ on the left side.       Radial pulses are 2+ on the right side, and 2+ on the left side.   No peripheral edema.   Pulmonary/Chest: Effort normal and breath sounds normal. No respiratory distress.   Abdominal: Normal appearance and bowel sounds are normal. She exhibits no abdominal bruit. There is no hepatosplenomegaly.   Musculoskeletal: Normal range of motion. She exhibits no edema or tenderness.        Lumbar back: She exhibits no tenderness and no spasm.   Neurological: She is alert and oriented to person, place, and time. She has normal strength. No cranial nerve deficit. She exhibits normal muscle tone.   Skin: Skin is warm and dry. No rash noted. No cyanosis. Nails show no clubbing.   Psychiatric: She has a normal mood and affect. Her behavior is normal.       Assessment:     1. Paroxysmal supraventricular tachycardia, details unknow - dx 3-5 y ago     2. Non-rheumatic mitral regurgitation         Medical Decision Making:  Today's Assessment / Status / Plan:     Valvular heart disease, mitral valve reviewed, echo 2017 with and without stress. Mild to moderate mitral regurgitation, no significant pulmonary hypertension. Stable. We are due to repeat this one to 2 years, I do not think she is symptomatic with this. Highly functional    Chest pain, unchanged. Stress echo 2015 in 2017 both normal " with good exercise capacity. Talked about angiography. Reassurance given. We'll talk to her new primary care doctor that she really liked. Talked about a GI workup or even antianxiety meds.    Statin intolerance  Long history of hyperlipidemia. Her primary doctor had stopped checking this because she will not go on a statin. History of hepatitis.     Reassuring. We talked at length about repeating this, if it is high risk for she's continuously symptomatic we talked about coronary angiography and possible percutaneous intervention afforded. She voices understanding. Again she does not take statins    Hypertension   Good control   Annual labs reviewed, follows with PCP     RTC 6 months

## 2018-02-08 ENCOUNTER — HOSPITAL ENCOUNTER (OUTPATIENT)
Dept: LAB | Facility: MEDICAL CENTER | Age: 73
End: 2018-02-08
Attending: SPECIALIST
Payer: MEDICARE

## 2018-02-08 LAB
ALBUMIN SERPL BCP-MCNC: 4.3 G/DL (ref 3.2–4.9)
ALBUMIN/GLOB SERPL: 1.3 G/DL
ALP SERPL-CCNC: 45 U/L (ref 30–99)
ALT SERPL-CCNC: 13 U/L (ref 2–50)
ANION GAP SERPL CALC-SCNC: 6 MMOL/L (ref 0–11.9)
AST SERPL-CCNC: 21 U/L (ref 12–45)
BASOPHILS # BLD AUTO: 1.8 % (ref 0–1.8)
BASOPHILS # BLD: 0.07 K/UL (ref 0–0.12)
BILIRUB SERPL-MCNC: 0.6 MG/DL (ref 0.1–1.5)
BUN SERPL-MCNC: 17 MG/DL (ref 8–22)
CALCIUM SERPL-MCNC: 10.5 MG/DL (ref 8.5–10.5)
CHLORIDE SERPL-SCNC: 105 MMOL/L (ref 96–112)
CO2 SERPL-SCNC: 28 MMOL/L (ref 20–33)
CREAT SERPL-MCNC: 0.91 MG/DL (ref 0.5–1.4)
EOSINOPHIL # BLD AUTO: 0.14 K/UL (ref 0–0.51)
EOSINOPHIL NFR BLD: 3.5 % (ref 0–6.9)
ERYTHROCYTE [DISTWIDTH] IN BLOOD BY AUTOMATED COUNT: 41.8 FL (ref 35.9–50)
GLOBULIN SER CALC-MCNC: 3.4 G/DL (ref 1.9–3.5)
GLUCOSE SERPL-MCNC: 71 MG/DL (ref 65–99)
HCT VFR BLD AUTO: 46.4 % (ref 37–47)
HGB BLD-MCNC: 15.2 G/DL (ref 12–16)
IMM GRANULOCYTES # BLD AUTO: 0.01 K/UL (ref 0–0.11)
IMM GRANULOCYTES NFR BLD AUTO: 0.3 % (ref 0–0.9)
LYMPHOCYTES # BLD AUTO: 0.83 K/UL (ref 1–4.8)
LYMPHOCYTES NFR BLD: 20.8 % (ref 22–41)
MCH RBC QN AUTO: 32.8 PG (ref 27–33)
MCHC RBC AUTO-ENTMCNC: 32.8 G/DL (ref 33.6–35)
MCV RBC AUTO: 100.2 FL (ref 81.4–97.8)
MONOCYTES # BLD AUTO: 0.52 K/UL (ref 0–0.85)
MONOCYTES NFR BLD AUTO: 13 % (ref 0–13.4)
NEUTROPHILS # BLD AUTO: 2.43 K/UL (ref 2–7.15)
NEUTROPHILS NFR BLD: 60.6 % (ref 44–72)
NRBC # BLD AUTO: 0 K/UL
NRBC BLD-RTO: 0 /100 WBC
PLATELET # BLD AUTO: 188 K/UL (ref 164–446)
PMV BLD AUTO: 11.4 FL (ref 9–12.9)
POTASSIUM SERPL-SCNC: 4.1 MMOL/L (ref 3.6–5.5)
PROT SERPL-MCNC: 7.7 G/DL (ref 6–8.2)
RBC # BLD AUTO: 4.63 M/UL (ref 4.2–5.4)
SODIUM SERPL-SCNC: 139 MMOL/L (ref 135–145)
WBC # BLD AUTO: 4 K/UL (ref 4.8–10.8)

## 2018-02-08 PROCEDURE — 36415 COLL VENOUS BLD VENIPUNCTURE: CPT

## 2018-02-08 PROCEDURE — 85025 COMPLETE CBC W/AUTO DIFF WBC: CPT

## 2018-02-08 PROCEDURE — 80053 COMPREHEN METABOLIC PANEL: CPT

## 2018-02-12 DIAGNOSIS — I34.0 MITRAL VALVE INSUFFICIENCY, UNSPECIFIED ETIOLOGY: ICD-10-CM

## 2018-02-12 DIAGNOSIS — K75.4 AUTOIMMUNE HEPATITIS (HCC): Chronic | ICD-10-CM

## 2018-02-12 PROBLEM — I10 ESSENTIAL HYPERTENSION: Status: ACTIVE | Noted: 2018-02-12

## 2018-02-12 RX ORDER — AZATHIOPRINE 75 MG/1
75 TABLET ORAL DAILY
Qty: 90 TAB | Refills: 4 | Status: SHIPPED | OUTPATIENT
Start: 2018-02-12 | End: 2019-04-11 | Stop reason: SDUPTHER

## 2018-02-12 RX ORDER — LISINOPRIL 5 MG/1
5 TABLET ORAL
Qty: 90 TAB | Refills: 4 | Status: SHIPPED | OUTPATIENT
Start: 2018-02-12 | End: 2018-02-15 | Stop reason: SDUPTHER

## 2018-02-15 DIAGNOSIS — I34.0 MITRAL VALVE INSUFFICIENCY, UNSPECIFIED ETIOLOGY: ICD-10-CM

## 2018-02-15 RX ORDER — LISINOPRIL 5 MG/1
5 TABLET ORAL
Qty: 90 TAB | Refills: 4 | Status: SHIPPED | OUTPATIENT
Start: 2018-02-15 | End: 2019-01-28

## 2018-03-05 DIAGNOSIS — M19.90 ARTHRITIS: ICD-10-CM

## 2018-05-10 DIAGNOSIS — J30.1 SEASONAL ALLERGIC RHINITIS DUE TO POLLEN: ICD-10-CM

## 2018-05-10 DIAGNOSIS — Z78.0 POSTMENOPAUSAL: ICD-10-CM

## 2018-05-10 RX ORDER — ESTRADIOL 1 MG/1
1 TABLET ORAL DAILY
Qty: 90 TAB | Refills: 3 | Status: SHIPPED | OUTPATIENT
Start: 2018-05-10 | End: 2019-01-28 | Stop reason: SDUPTHER

## 2018-05-10 RX ORDER — MONTELUKAST SODIUM 10 MG/1
10 TABLET ORAL DAILY
Qty: 90 TAB | Refills: 4 | Status: SHIPPED | OUTPATIENT
Start: 2018-05-10 | End: 2019-04-11 | Stop reason: SDUPTHER

## 2018-08-23 ENCOUNTER — HOSPITAL ENCOUNTER (OUTPATIENT)
Dept: LAB | Facility: MEDICAL CENTER | Age: 73
End: 2018-08-23
Attending: INTERNAL MEDICINE
Payer: MEDICARE

## 2018-08-23 LAB
ALBUMIN SERPL BCP-MCNC: 4.3 G/DL (ref 3.2–4.9)
ALBUMIN/GLOB SERPL: 1.3 G/DL
ALP SERPL-CCNC: 46 U/L (ref 30–99)
ALT SERPL-CCNC: 17 U/L (ref 2–50)
ANION GAP SERPL CALC-SCNC: 5 MMOL/L (ref 0–11.9)
AST SERPL-CCNC: 21 U/L (ref 12–45)
BASOPHILS # BLD AUTO: 1.4 % (ref 0–1.8)
BASOPHILS # BLD: 0.05 K/UL (ref 0–0.12)
BILIRUB CONJ SERPL-MCNC: 0.2 MG/DL (ref 0.1–0.5)
BILIRUB INDIRECT SERPL-MCNC: 0.3 MG/DL (ref 0–1)
BILIRUB SERPL-MCNC: 0.5 MG/DL (ref 0.1–1.5)
BUN SERPL-MCNC: 18 MG/DL (ref 8–22)
CALCIUM SERPL-MCNC: 9.7 MG/DL (ref 8.5–10.5)
CHLORIDE SERPL-SCNC: 104 MMOL/L (ref 96–112)
CO2 SERPL-SCNC: 27 MMOL/L (ref 20–33)
CREAT SERPL-MCNC: 0.93 MG/DL (ref 0.5–1.4)
EOSINOPHIL # BLD AUTO: 0.15 K/UL (ref 0–0.51)
EOSINOPHIL NFR BLD: 4.2 % (ref 0–6.9)
ERYTHROCYTE [DISTWIDTH] IN BLOOD BY AUTOMATED COUNT: 42.4 FL (ref 35.9–50)
GLOBULIN SER CALC-MCNC: 3.3 G/DL (ref 1.9–3.5)
GLUCOSE SERPL-MCNC: 53 MG/DL (ref 65–99)
HCT VFR BLD AUTO: 44.7 % (ref 37–47)
HGB BLD-MCNC: 14.7 G/DL (ref 12–16)
IMM GRANULOCYTES # BLD AUTO: 0.01 K/UL (ref 0–0.11)
IMM GRANULOCYTES NFR BLD AUTO: 0.3 % (ref 0–0.9)
LYMPHOCYTES # BLD AUTO: 0.77 K/UL (ref 1–4.8)
LYMPHOCYTES NFR BLD: 21.5 % (ref 22–41)
MCH RBC QN AUTO: 32.5 PG (ref 27–33)
MCHC RBC AUTO-ENTMCNC: 32.9 G/DL (ref 33.6–35)
MCV RBC AUTO: 98.7 FL (ref 81.4–97.8)
MONOCYTES # BLD AUTO: 0.43 K/UL (ref 0–0.85)
MONOCYTES NFR BLD AUTO: 12 % (ref 0–13.4)
NEUTROPHILS # BLD AUTO: 2.17 K/UL (ref 2–7.15)
NEUTROPHILS NFR BLD: 60.6 % (ref 44–72)
NRBC # BLD AUTO: 0 K/UL
NRBC BLD-RTO: 0 /100 WBC
PLATELET # BLD AUTO: 171 K/UL (ref 164–446)
PLATELET # BLD AUTO: 172 K/UL (ref 164–446)
PMV BLD AUTO: 11.2 FL (ref 9–12.9)
POTASSIUM SERPL-SCNC: 3.8 MMOL/L (ref 3.6–5.5)
PROT SERPL-MCNC: 7.6 G/DL (ref 6–8.2)
RBC # BLD AUTO: 4.53 M/UL (ref 4.2–5.4)
SODIUM SERPL-SCNC: 136 MMOL/L (ref 135–145)
WBC # BLD AUTO: 3.6 K/UL (ref 4.8–10.8)

## 2018-08-23 PROCEDURE — 85025 COMPLETE CBC W/AUTO DIFF WBC: CPT

## 2018-08-23 PROCEDURE — 85049 AUTOMATED PLATELET COUNT: CPT

## 2018-08-23 PROCEDURE — 36415 COLL VENOUS BLD VENIPUNCTURE: CPT

## 2018-08-23 PROCEDURE — 84520 ASSAY OF UREA NITROGEN: CPT

## 2018-08-23 PROCEDURE — 82977 ASSAY OF GGT: CPT

## 2018-08-23 PROCEDURE — 80053 COMPREHEN METABOLIC PANEL: CPT

## 2018-08-23 PROCEDURE — 84460 ALANINE AMINO (ALT) (SGPT): CPT

## 2018-08-23 PROCEDURE — 84450 TRANSFERASE (AST) (SGOT): CPT

## 2018-08-23 PROCEDURE — 83883 ASSAY NEPHELOMETRY NOT SPEC: CPT

## 2018-08-23 PROCEDURE — 82248 BILIRUBIN DIRECT: CPT

## 2018-08-27 LAB
A2 MACROGLOB SERPL-MCNC: 355 MG/DL (ref 131–293)
ALT SERPL-CCNC: 22 U/L (ref 5–40)
ANNOTATION COMMENT IMP: ABNORMAL
AST SERPL-CCNC: 28 U/L (ref 9–40)
BUN SERPL-SCNC: 18 MG/DL (ref 7–20)
CIRRHOMETER PT SCORE Q4850: 0.67
FIBROSIS STAGE SERPL QL: ABNORMAL
GGT SERPL-CCNC: 12 U/L (ref 7–33)
INFLAMETER META CLASS Q4853: ABNORMAL
INFLAMETER PT SCORE Q4852: 0.68
LIVER FIBR SCORE SERPL CALC.FIBROMETER: 0.9
PATHOLOGY STUDY: ABNORMAL
PROTHROM ACT/NOR PPP: 64 % (ref 90–120)

## 2018-09-12 ENCOUNTER — HOSPITAL ENCOUNTER (OUTPATIENT)
Dept: RADIOLOGY | Facility: MEDICAL CENTER | Age: 73
End: 2018-09-12
Attending: INTERNAL MEDICINE
Payer: MEDICARE

## 2018-09-12 DIAGNOSIS — K75.4 AUTOIMMUNE HEPATITIS (HCC): ICD-10-CM

## 2018-09-12 PROCEDURE — 76700 US EXAM ABDOM COMPLETE: CPT

## 2018-09-19 DIAGNOSIS — E16.2 HYPOGLYCEMIA: ICD-10-CM

## 2018-09-19 DIAGNOSIS — E55.9 VITAMIN D DEFICIENCY: Chronic | ICD-10-CM

## 2018-09-19 DIAGNOSIS — E03.9 ACQUIRED HYPOTHYROIDISM: Chronic | ICD-10-CM

## 2018-09-19 DIAGNOSIS — E53.8 B12 DEFICIENCY: ICD-10-CM

## 2018-09-19 DIAGNOSIS — D75.89 MACROCYTOSIS: ICD-10-CM

## 2018-09-20 ENCOUNTER — NON-PROVIDER VISIT (OUTPATIENT)
Dept: INTERNAL MEDICINE | Facility: IMAGING CENTER | Age: 73
End: 2018-09-20
Payer: MEDICARE

## 2018-09-20 ENCOUNTER — HOSPITAL ENCOUNTER (OUTPATIENT)
Facility: MEDICAL CENTER | Age: 73
End: 2018-09-20
Attending: FAMILY MEDICINE
Payer: MEDICARE

## 2018-09-20 DIAGNOSIS — E03.9 ACQUIRED HYPOTHYROIDISM: Chronic | ICD-10-CM

## 2018-09-20 DIAGNOSIS — D75.89 MACROCYTOSIS: ICD-10-CM

## 2018-09-20 DIAGNOSIS — Z23 NEED FOR INFLUENZA VACCINATION: ICD-10-CM

## 2018-09-20 DIAGNOSIS — E55.9 VITAMIN D DEFICIENCY: Chronic | ICD-10-CM

## 2018-09-20 DIAGNOSIS — E53.8 B12 DEFICIENCY: ICD-10-CM

## 2018-09-20 DIAGNOSIS — E16.2 HYPOGLYCEMIA: ICD-10-CM

## 2018-09-20 DIAGNOSIS — Z01.89 ENCOUNTER FOR ROUTINE LABORATORY TESTING: ICD-10-CM

## 2018-09-20 LAB
25(OH)D3 SERPL-MCNC: 42 NG/ML (ref 30–100)
FOLATE SERPL-MCNC: >24 NG/ML
T3FREE SERPL-MCNC: 3.54 PG/ML (ref 2.4–4.2)
T4 FREE SERPL-MCNC: 1.17 NG/DL (ref 0.53–1.43)
TSH SERPL DL<=0.005 MIU/L-ACNC: 6.75 UIU/ML (ref 0.38–5.33)
VIT B12 SERPL-MCNC: 1316 PG/ML (ref 211–911)

## 2018-09-20 PROCEDURE — 83036 HEMOGLOBIN GLYCOSYLATED A1C: CPT

## 2018-09-20 PROCEDURE — 82746 ASSAY OF FOLIC ACID SERUM: CPT

## 2018-09-20 PROCEDURE — G0008 ADMIN INFLUENZA VIRUS VAC: HCPCS | Performed by: FAMILY MEDICINE

## 2018-09-20 PROCEDURE — 82607 VITAMIN B-12: CPT

## 2018-09-20 PROCEDURE — 84481 FREE ASSAY (FT-3): CPT

## 2018-09-20 PROCEDURE — 84439 ASSAY OF FREE THYROXINE: CPT

## 2018-09-20 PROCEDURE — 90662 IIV NO PRSV INCREASED AG IM: CPT | Performed by: FAMILY MEDICINE

## 2018-09-20 PROCEDURE — 82306 VITAMIN D 25 HYDROXY: CPT

## 2018-09-20 PROCEDURE — 84443 ASSAY THYROID STIM HORMONE: CPT

## 2018-09-21 LAB
EST. AVERAGE GLUCOSE BLD GHB EST-MCNC: 120 MG/DL
HBA1C MFR BLD: 5.8 % (ref 0–5.6)

## 2018-09-26 ENCOUNTER — OFFICE VISIT (OUTPATIENT)
Dept: INTERNAL MEDICINE | Facility: IMAGING CENTER | Age: 73
End: 2018-09-26
Payer: MEDICARE

## 2018-09-26 VITALS
BODY MASS INDEX: 23.21 KG/M2 | OXYGEN SATURATION: 96 % | HEIGHT: 63 IN | SYSTOLIC BLOOD PRESSURE: 112 MMHG | WEIGHT: 131 LBS | RESPIRATION RATE: 14 BRPM | HEART RATE: 75 BPM | TEMPERATURE: 98.1 F | DIASTOLIC BLOOD PRESSURE: 60 MMHG

## 2018-09-26 DIAGNOSIS — E53.8 B12 DEFICIENCY: ICD-10-CM

## 2018-09-26 DIAGNOSIS — Z00.00 ENCOUNTER FOR MEDICARE ANNUAL WELLNESS EXAM: ICD-10-CM

## 2018-09-26 DIAGNOSIS — J30.1 SEASONAL ALLERGIC RHINITIS DUE TO POLLEN: Chronic | ICD-10-CM

## 2018-09-26 DIAGNOSIS — I47.10 PAROXYSMAL SUPRAVENTRICULAR TACHYCARDIA: Chronic | ICD-10-CM

## 2018-09-26 DIAGNOSIS — K75.4 AUTOIMMUNE HEPATITIS (HCC): Chronic | ICD-10-CM

## 2018-09-26 DIAGNOSIS — Z71.85 VACCINE COUNSELING: ICD-10-CM

## 2018-09-26 DIAGNOSIS — E03.9 ACQUIRED HYPOTHYROIDISM: Chronic | ICD-10-CM

## 2018-09-26 DIAGNOSIS — Z12.31 ENCOUNTER FOR SCREENING MAMMOGRAM FOR BREAST CANCER: ICD-10-CM

## 2018-09-26 DIAGNOSIS — I10 ESSENTIAL HYPERTENSION: ICD-10-CM

## 2018-09-26 DIAGNOSIS — I34.0 NON-RHEUMATIC MITRAL REGURGITATION: Chronic | ICD-10-CM

## 2018-09-26 DIAGNOSIS — R73.09 ELEVATED HEMOGLOBIN A1C: ICD-10-CM

## 2018-09-26 DIAGNOSIS — M85.89 OSTEOPENIA OF MULTIPLE SITES: Chronic | ICD-10-CM

## 2018-09-26 DIAGNOSIS — E55.9 VITAMIN D DEFICIENCY: Chronic | ICD-10-CM

## 2018-09-26 PROCEDURE — G0439 PPPS, SUBSEQ VISIT: HCPCS | Performed by: FAMILY MEDICINE

## 2018-09-26 RX ORDER — LEVOTHYROXINE SODIUM 0.12 MG/1
125 TABLET ORAL
Qty: 90 TAB | Refills: 3 | Status: SHIPPED | OUTPATIENT
Start: 2018-09-26 | End: 2018-10-08

## 2018-09-26 ASSESSMENT — ENCOUNTER SYMPTOMS: GENERAL WELL-BEING: EXCELLENT

## 2018-09-26 ASSESSMENT — ACTIVITIES OF DAILY LIVING (ADL): BATHING_REQUIRES_ASSISTANCE: 0

## 2018-09-26 ASSESSMENT — PATIENT HEALTH QUESTIONNAIRE - PHQ9: CLINICAL INTERPRETATION OF PHQ2 SCORE: 0

## 2018-09-26 NOTE — PROGRESS NOTES
CC:   Medicare Annual Wellness Visit    HPI:  Angela is a 73 y.o. Female here for Medicare Annual Wellness Visit with her . She denies recent health changes and feels very well overall. She had fasting labs done 9/20/18 and would like to review results with me today. She has chronic acquired hypothyroidism and is currently taking Synthroid 125 mcg 5 days per week and Synthroid 62.5 mcg 2 days per week. She continues to feel tired and have brittle, dry hair at times. She also has chronic essential HTN well controlled with daily lisinopril and chronic vitamin deficiencies on daily supplementation. She also autoimmune hepatitis well controlled with azathioprine and managed by Dr. Gavin and history of PSVT/mitral regurgitation managed by Dr. GREG Mtz. She is overdue for annual screening mammogram and was provided with Shingrix information sheet. She endorses 100% medication compliance and remains physically active.     Patient Active Problem List    Diagnosis Date Noted   • Elevated hemoglobin A1c 09/26/2018   • Essential hypertension 02/12/2018   • B12 deficiency 05/30/2017   • Osteopenia of multiple sites 05/17/2017   • Vitamin D deficiency 05/17/2017   • Acquired hypothyroidism 05/17/2017   • Seasonal allergic rhinitis due to pollen 05/17/2017   • Immunization not carried out because of immune compromised state 05/17/2017   • Autoimmune hepatitis (CMS-HCC) 10/10/2014   • Mitral regurgitation 10/10/2014   • Paroxysmal supraventricular tachycardia, details unknow - dx 3-5 y ago 04/19/2013     Current Outpatient Prescriptions   Medication Sig Dispense Refill   • levothyroxine (SYNTHROID) 125 MCG Tab Take 1 Tab by mouth Every morning on an empty stomach. 90 Tab 3   • estradiol (ESTRACE) 1 MG Tab Take 1 Tab by mouth every day. 90 Tab 3   • montelukast (SINGULAIR) 10 MG Tab Take 1 Tab by mouth every day. 90 Tab 4   • lisinopril (PRINIVIL) 5 MG Tab Take 1 Tab by mouth every bedtime. 90 Tab 4   • Azathioprine 75  MG Tab Take 1 Tab by mouth every day. 90 Tab 4   • Cholecalciferol (VITAMIN D3) 2000 UNIT Cap Take 1 Cap by mouth every day.     • cyanocobalamin (VITAMIN B12) 1000 MCG Tab Take 1 Tab by mouth every day. 30 Tab 6   • fexofenadine (ALLEGRA) 180 MG tablet Take 180 mg by mouth 1 time daily as needed. Indications: Hayfever     • aspirin EC (ECOTRIN) 81 MG TBEC Take 81 mg by mouth every day.     • Calcium Carb-Cholecalciferol (CALCIUM 1000 + D) 1000-800 MG-UNIT TABS Take 1 Tab by mouth every day.     • docosahexanoic acid (OMEGA 3 FA) 1000 MG CAPS Take 1,000 mg by mouth 2 Times a Day.     • levothyroxine (SYNTHROID) 125 MCG TABS Take 125 mcg by mouth every day.     • Diclofenac Sodium 1 % Gel Apply to affected areas of skin BID PRN arthritis pain 600 g 4     No current facility-administered medications for this visit.       Current supplements: see MAR  Chronic narcotic pain medicines: none  Allergies: Statins [hmg-coa-r inhibitors]; Codeine; Hydrocodone; Librium; Lopressor [kdc:ci pigment blue 63+metoprolol]; and Sulphadimidine sodium [sulfamethazine sodium]  Exercise: yes  Current social contact/activities: yes    Screening:  Depression Screening    Little interest or pleasure in doing things?  0 - not at all  Feeling down, depressed , or hopeless? 0 - not at all  Patient Health Questionnaire Score: 0     Screening for Cognitive Impairment    Three Minute Recall (leader, season, table) 3/3    [place] clock face with all 12 numbers and set the hands to show 10 past 11.  Yes    Cognitive concerns identified deferred for follow up unless specifically addressed in assessment and plan.    Fall Risk Assessment    Has the patient had two or more falls in the last year or any fall with injury in the last year?  No    Safety Assessment    Throw rugs on floor.  No  Handrails on all stairs.  Yes  Good lighting in all hallways.  Yes  Difficulty hearing.  No  Patient counseled about all safety risks that were identified.    Functional  Assessment ADLs    Are there any barriers preventing you from cooking for yourself or meeting nutritional needs?  No.    Are there any barriers preventing you from driving safely or obtaining transportation?  No.    Are there any barriers preventing you from using a telephone or calling for help?  No.    Are there any barriers preventing you from shopping?  No.    Are there any barriers preventing you from taking care of your own finances?  No.    Are there any barriers preventing you from managing your medications?  No.    Are there any barriers preventing you from showering, bathing or dressing yourself?  No.    Are you currently engaging in any exercise or physical activity?  Yes.     What is your perception of your health?  Excellent.      Health Maintenance Summary                IMM ZOSTER VACCINES Overdue 9/13/1996     MAMMOGRAM Overdue 7/24/2018      Done 7/24/2017 [id number]     Patient has more history with this topic...    Annual Wellness Visit Next Due 9/27/2019      Done 9/26/2018 Visit Dx: Encounter for Medicare annual wellness exam     Patient has more history with this topic...    COLONOSCOPY Next Due 2/17/2021      Done 2/17/2016 REFERRAL TO GI FOR COLONOSCOPY    BONE DENSITY Next Due 9/14/2022      Done 9/14/2017 DS-BONE DENSITY STUDY (DEXA)     Patient has more history with this topic...    IMM DTaP/Tdap/Td Vaccine Next Due 9/14/2027      Done 9/14/2017 Imm Admin: Tdap Vaccine          Patient Care Team:  Enedelia Bourne M.D. as PCP - General (Family Medicine)  Enedelia Bourne M.D. (Family Medicine)      Social History   Substance Use Topics   • Smoking status: Never Smoker   • Smokeless tobacco: Never Used   • Alcohol use No     Family History   Problem Relation Age of Onset   • Heart Disease Mother    • Cancer Mother    • Heart Disease Father    • Cancer Father    • Heart Disease Sister    • Heart Attack Sister    • Heart Disease Brother    • Heart Attack Brother    • Heart  "Disease Brother    • Heart Attack Brother    • Diabetes Child    • Psychiatry Child         hodgkins survivor     She  has a past medical history of Autoimmune hepatitis (CMS-HCC) (HCC); B12 deficiency; Congenital malrotation of intestine; Fatigue (5/22/2013); Other chest pain (4/19/2013); Paroxysmal supraventricular tachycardia, details unknow - dx 3-5 y ago (4/19/2013); and Thyroid disease.   Past Surgical History:   Procedure Laterality Date   • LUMPECTOMY  1982    right breast   • ABDOMINAL HYSTERECTOMY TOTAL  1970   • HERNIA REPAIR  1970    umbilical   • TONSILLECTOMY  1954   • APPENDECTOMY  1950   • OTHER ABDOMINAL SURGERY  1965-66    congenital malrotation of intestines   • PRIMARY C SECTION  1968/1970       ROS:    All positives noted in HPI. All others reviewed and are negative.    Ostomy or other tubes or amputations: no  Chronic oxygen use: no  Last eye exam: UTD per pt report  : denies incontinence; does not interfere with ADLs/ sleep  Gait: stable  Hearing: good  Dentition: adequate    Lab results 9/20/18 reviewed in detail with patient and  at visit today.    Exam:   Blood pressure 112/60, pulse 75, temperature 36.7 °C (98.1 °F), temperature source Temporal, resp. rate 14, height 1.6 m (5' 3\"), weight 59.4 kg (131 lb), SpO2 96 %. Body mass index is 23.21 kg/m².    Gen: Well developed; well nourished; no acute distress; age appropriate appearance   HEENT: Normocephalic; atraumatic; PEERLA b/l; sclera clear b/l; b/l external auditory canals WNL; b/l TM WNL; nares patent; oropharynx clear; oral mucosa moist; tongue midline; dentition adequate   Neck: No adenopathy; no thyromegaly  CV: Regular rate and rhythm; S1/ S2 present; no murmur, gallop or rub noted  Pulm: No respiratory distress; clear to ascultation b/l; no wheezing or stridor noted b/l  Abd: Adequate bowel sounds noted; soft and nontender; no rebound, rigidity, nor distention; no hepatosplenogmegaly   Extremities: No peripheral edema b/l " LE extremities/ no clubbing nor cyanosis noted  Skin: Warm and dry; no rashes noted   Neuro: No focal deficits noted; pt is able to get up out of chair unassisted and walk forward  Psych: AAOx4; mood and affect are appropriate    Assessment and Plan:  1. Encounter for Medicare annual wellness exam  Pt is stable and no new service needs identified at visit today.   - Annual Wellness Visit - Includes PPPS Subsequent ()    2. Encounter for screening mammogram for breast cancer  Overdue/ pt provided with scheduling number (she intends to transfer care from [facility]).   - Annual Wellness Visit - Includes PPPS Subsequent ()  - MA-SCREEN MAMMO W/CAD-BILAT; Future    3. Acquired hypothyroidism  Uncontrolled/ TSH is 6.75/ Recommend patient take Synthroid 125 mcg 7 days per week and repeat thyroid labs in 6-8 weeks. New RX sent to mail order pharmacy per pt request.   - Annual Wellness Visit - Includes PPPS Subsequent ()  - TSH; Future  - T3 FREE; Future  - FREE THYROXINE; Future  - levothyroxine (SYNTHROID) 125 MCG Tab; Take 1 Tab by mouth Every morning on an empty stomach.  Dispense: 90 Tab; Refill: 3    4. Autoimmune hepatitis (CMS-HCC)  Stable/ pt is to continue azathioprine managed by Dr. Gavin.   - Annual Wellness Visit - Includes PPPS Subsequent ()    5. B12 deficiency  B12 level is now 1316. Pt can stop B12 supplementation at this time.   - Annual Wellness Visit - Includes PPPS Subsequent ()    6. Essential hypertension  Stable/ well controlled/ pt is to continue daily lisinopril.   - Annual Wellness Visit - Includes PPPS Subsequent ()    7. Non-rheumatic mitral regurgitation  Stable/ managed by Dr. GREG Mtz  - Annual Wellness Visit - Includes PPPS Subsequent ()    8. Osteopenia of multiple sites  Stable/ pt is to continue daily calcium and vitamin D supplementation.  - Annual Wellness Visit - Includes PPPS Subsequent ()    9. Paroxysmal supraventricular tachycardia, details  unknow - dx 3-5 y ago  Stable/ no recent events/ managed by Dr. GREG Mtz  - Annual Wellness Visit - Includes PPPS Subsequent ()    10. Seasonal allergic rhinitis due to pollen  Stable with regular control medication use  - Annual Wellness Visit - Includes PPPS Subsequent ()    11. Vitamin D deficiency  Stable/ pt can continue daily vitamin D for maintenance.   - Annual Wellness Visit - Includes PPPS Subsequent ()    12. Elevated hemoglobin A1c  HgA1c now 5.8. Strategies for decreasing her average blood glucose average discussed at length including decreasing fruit and simple carbohydrate intake daily. Recommend POC A1c in 3 months.   - Annual Wellness Visit - Includes PPPS Subsequent ()    13. Vaccine counseling  Pt provided with Shingrix information sheet at visit today.   - Annual Wellness Visit - Includes PPPS Subsequent ()     Services needed: no new services needed at this time  Health Care Screening: recommendations as per orders if indicated.  Referrals offered: none  Counseling provided today:  · Prevent falls and reduce trip hazards; Secure or remove rugs if present   · Maintain working fire alarm and carbon monoxide detectors   · Engage in regular physical activity daily and social activities weekly as tolerated     Pt is stable at this time and will return for lab draw as described above in the next two to three months.

## 2018-10-02 DIAGNOSIS — E03.9 ACQUIRED HYPOTHYROIDISM: Chronic | ICD-10-CM

## 2018-10-02 RX ORDER — LEVOTHYROXINE SODIUM 0.12 MG/1
125 TABLET ORAL
Qty: 90 TAB | Refills: 3 | Status: SHIPPED | OUTPATIENT
Start: 2018-10-02 | End: 2018-10-08 | Stop reason: SDUPTHER

## 2018-10-02 RX ORDER — LEVOTHYROXINE SODIUM 0.12 MG/1
125 TABLET ORAL DAILY
Qty: 30 TAB | Status: CANCELLED | OUTPATIENT
Start: 2018-10-02

## 2018-10-08 RX ORDER — LEVOTHYROXINE SODIUM 0.12 MG/1
125 TABLET ORAL
Qty: 90 TAB | Refills: 3 | Status: SHIPPED | OUTPATIENT
Start: 2018-10-08 | End: 2019-04-11 | Stop reason: SDUPTHER

## 2018-10-18 ENCOUNTER — HOSPITAL ENCOUNTER (OUTPATIENT)
Dept: RADIOLOGY | Facility: MEDICAL CENTER | Age: 73
End: 2018-10-18

## 2018-11-21 ENCOUNTER — HOSPITAL ENCOUNTER (OUTPATIENT)
Dept: LAB | Facility: MEDICAL CENTER | Age: 73
End: 2018-11-21
Attending: INTERNAL MEDICINE
Payer: MEDICARE

## 2018-11-21 ENCOUNTER — HOSPITAL ENCOUNTER (OUTPATIENT)
Facility: MEDICAL CENTER | Age: 73
End: 2018-11-21
Attending: FAMILY MEDICINE
Payer: MEDICARE

## 2018-11-21 ENCOUNTER — HOSPITAL ENCOUNTER (OUTPATIENT)
Dept: RADIOLOGY | Facility: MEDICAL CENTER | Age: 73
End: 2018-11-21
Attending: FAMILY MEDICINE
Payer: MEDICARE

## 2018-11-21 ENCOUNTER — NON-PROVIDER VISIT (OUTPATIENT)
Dept: INTERNAL MEDICINE | Facility: IMAGING CENTER | Age: 73
End: 2018-11-21
Payer: MEDICARE

## 2018-11-21 DIAGNOSIS — Z12.31 ENCOUNTER FOR SCREENING MAMMOGRAM FOR BREAST CANCER: ICD-10-CM

## 2018-11-21 DIAGNOSIS — K75.4 AUTOIMMUNE HEPATITIS (HCC): Chronic | ICD-10-CM

## 2018-11-21 DIAGNOSIS — E03.9 ACQUIRED HYPOTHYROIDISM: Chronic | ICD-10-CM

## 2018-11-21 LAB
ALBUMIN SERPL BCP-MCNC: 4.6 G/DL (ref 3.2–4.9)
ALBUMIN/GLOB SERPL: 1.3 G/DL
ALP SERPL-CCNC: 51 U/L (ref 30–99)
ALT SERPL-CCNC: 19 U/L (ref 2–50)
ANION GAP SERPL CALC-SCNC: 8 MMOL/L (ref 0–11.9)
AST SERPL-CCNC: 23 U/L (ref 12–45)
BASOPHILS # BLD AUTO: 1.4 % (ref 0–1.8)
BASOPHILS # BLD: 0.06 K/UL (ref 0–0.12)
BILIRUB SERPL-MCNC: 0.5 MG/DL (ref 0.1–1.5)
BUN SERPL-MCNC: 22 MG/DL (ref 8–22)
CALCIUM SERPL-MCNC: 10.1 MG/DL (ref 8.5–10.5)
CHLORIDE SERPL-SCNC: 105 MMOL/L (ref 96–112)
CO2 SERPL-SCNC: 28 MMOL/L (ref 20–33)
CREAT SERPL-MCNC: 0.85 MG/DL (ref 0.5–1.4)
EOSINOPHIL # BLD AUTO: 0.29 K/UL (ref 0–0.51)
EOSINOPHIL NFR BLD: 6.5 % (ref 0–6.9)
ERYTHROCYTE [DISTWIDTH] IN BLOOD BY AUTOMATED COUNT: 42 FL (ref 35.9–50)
GLOBULIN SER CALC-MCNC: 3.5 G/DL (ref 1.9–3.5)
GLUCOSE SERPL-MCNC: 67 MG/DL (ref 65–99)
HCT VFR BLD AUTO: 47.3 % (ref 37–47)
HGB BLD-MCNC: 15.5 G/DL (ref 12–16)
IMM GRANULOCYTES # BLD AUTO: 0.02 K/UL (ref 0–0.11)
IMM GRANULOCYTES NFR BLD AUTO: 0.5 % (ref 0–0.9)
INR PPP: 0.98 (ref 0.87–1.13)
LYMPHOCYTES # BLD AUTO: 0.77 K/UL (ref 1–4.8)
LYMPHOCYTES NFR BLD: 17.4 % (ref 22–41)
MCH RBC QN AUTO: 32.8 PG (ref 27–33)
MCHC RBC AUTO-ENTMCNC: 32.8 G/DL (ref 33.6–35)
MCV RBC AUTO: 100 FL (ref 81.4–97.8)
MONOCYTES # BLD AUTO: 0.31 K/UL (ref 0–0.85)
MONOCYTES NFR BLD AUTO: 7 % (ref 0–13.4)
NEUTROPHILS # BLD AUTO: 2.98 K/UL (ref 2–7.15)
NEUTROPHILS NFR BLD: 67.2 % (ref 44–72)
NRBC # BLD AUTO: 0 K/UL
NRBC BLD-RTO: 0 /100 WBC
PLATELET # BLD AUTO: 199 K/UL (ref 164–446)
PMV BLD AUTO: 11.4 FL (ref 9–12.9)
POTASSIUM SERPL-SCNC: 3.6 MMOL/L (ref 3.6–5.5)
PROT SERPL-MCNC: 8.1 G/DL (ref 6–8.2)
PROTHROMBIN TIME: 13.1 SEC (ref 12–14.6)
RBC # BLD AUTO: 4.73 M/UL (ref 4.2–5.4)
SODIUM SERPL-SCNC: 141 MMOL/L (ref 135–145)
T3FREE SERPL-MCNC: 3.94 PG/ML (ref 2.4–4.2)
T4 FREE SERPL-MCNC: 1.26 NG/DL (ref 0.53–1.43)
TSH SERPL DL<=0.005 MIU/L-ACNC: 1.68 UIU/ML (ref 0.38–5.33)
WBC # BLD AUTO: 4.4 K/UL (ref 4.8–10.8)

## 2018-11-21 PROCEDURE — 77067 SCR MAMMO BI INCL CAD: CPT

## 2018-11-21 PROCEDURE — 84439 ASSAY OF FREE THYROXINE: CPT

## 2018-11-21 PROCEDURE — 85610 PROTHROMBIN TIME: CPT

## 2018-11-21 PROCEDURE — 80053 COMPREHEN METABOLIC PANEL: CPT

## 2018-11-21 PROCEDURE — 84443 ASSAY THYROID STIM HORMONE: CPT

## 2018-11-21 PROCEDURE — 84481 FREE ASSAY (FT-3): CPT

## 2018-11-21 PROCEDURE — 85025 COMPLETE CBC W/AUTO DIFF WBC: CPT

## 2018-12-06 ENCOUNTER — OFFICE VISIT (OUTPATIENT)
Dept: CARDIOLOGY | Facility: MEDICAL CENTER | Age: 73
End: 2018-12-06
Payer: MEDICARE

## 2018-12-06 VITALS
BODY MASS INDEX: 22.86 KG/M2 | WEIGHT: 129 LBS | HEIGHT: 63 IN | SYSTOLIC BLOOD PRESSURE: 110 MMHG | DIASTOLIC BLOOD PRESSURE: 60 MMHG | OXYGEN SATURATION: 96 % | HEART RATE: 80 BPM

## 2018-12-06 DIAGNOSIS — I34.0 NON-RHEUMATIC MITRAL REGURGITATION: Chronic | ICD-10-CM

## 2018-12-06 DIAGNOSIS — I47.10 PAROXYSMAL SUPRAVENTRICULAR TACHYCARDIA: Chronic | ICD-10-CM

## 2018-12-06 DIAGNOSIS — I10 ESSENTIAL HYPERTENSION: ICD-10-CM

## 2018-12-06 PROBLEM — Z28.03: Status: RESOLVED | Noted: 2017-05-17 | Resolved: 2018-12-06

## 2018-12-06 PROBLEM — R73.09 ELEVATED HEMOGLOBIN A1C: Status: RESOLVED | Noted: 2018-09-26 | Resolved: 2018-12-06

## 2018-12-06 PROBLEM — E55.9 VITAMIN D DEFICIENCY: Chronic | Status: RESOLVED | Noted: 2017-05-17 | Resolved: 2018-12-06

## 2018-12-06 PROBLEM — J30.1 SEASONAL ALLERGIC RHINITIS DUE TO POLLEN: Chronic | Status: RESOLVED | Noted: 2017-05-17 | Resolved: 2018-12-06

## 2018-12-06 PROCEDURE — 99214 OFFICE O/P EST MOD 30 MIN: CPT | Performed by: INTERNAL MEDICINE

## 2018-12-06 RX ORDER — AZATHIOPRINE 50 MG/1
50 TABLET ORAL DAILY
COMMUNITY
Start: 2015-06-13 | End: 2019-04-11 | Stop reason: CLARIF

## 2018-12-06 RX ORDER — LISINOPRIL 5 MG/1
5 TABLET ORAL DAILY
COMMUNITY
Start: 2015-08-24 | End: 2019-01-28 | Stop reason: SDUPTHER

## 2018-12-06 RX ORDER — MONTELUKAST SODIUM 10 MG/1
10 TABLET ORAL DAILY
COMMUNITY
Start: 2015-09-03 | End: 2018-12-06

## 2018-12-06 RX ORDER — ESTRADIOL 1 MG/1
1 TABLET ORAL DAILY
COMMUNITY
Start: 2015-07-09 | End: 2018-12-06

## 2018-12-06 ASSESSMENT — ENCOUNTER SYMPTOMS
HEARTBURN: 0
INSOMNIA: 0
COUGH: 0
PALPITATIONS: 0
NAUSEA: 0
BRUISES/BLEEDS EASILY: 0
WEIGHT LOSS: 0
SHORTNESS OF BREATH: 0
EYES NEGATIVE: 1
DIZZINESS: 0
MUSCULOSKELETAL NEGATIVE: 1
NERVOUS/ANXIOUS: 0
RESPIRATORY NEGATIVE: 1
LOSS OF CONSCIOUSNESS: 0

## 2018-12-06 NOTE — PROGRESS NOTES
Chief Complaint   Patient presents with   • Supraventricular Tachycardia (SVT)     follow up       Subjective:   Angela Davis is a 73 y.o. female who presents today in follow-up in regards to her mitral regurgitation, presumptive noncardiac chest pain and remote SVT    Still worries about her , and with him today  Wonders why she was put on a statin which caused permanent liver damage  Tolerating her medications, healthy and active no limitations or concerns    Past Medical History:   Diagnosis Date   • Autoimmune hepatitis (HCC)    • B12 deficiency    • Congenital malrotation of intestine    • Fatigue 5/22/2013   • Other chest pain 4/19/2013   • Paroxysmal supraventricular tachycardia, details unknow - dx 3-5 y ago 4/19/2013   • Thyroid disease      Past Surgical History:   Procedure Laterality Date   • LUMPECTOMY  1982    right breast   • ABDOMINAL HYSTERECTOMY TOTAL  1970   • HERNIA REPAIR  1970    umbilical   • TONSILLECTOMY  1954   • APPENDECTOMY  1950   • OTHER ABDOMINAL SURGERY  1965-66    congenital malrotation of intestines   • PRIMARY C SECTION  1968/1970     Family History   Problem Relation Age of Onset   • Heart Disease Mother    • Cancer Mother    • Heart Disease Father    • Cancer Father    • Heart Disease Sister    • Heart Attack Sister    • Heart Disease Brother    • Heart Attack Brother    • Heart Disease Brother    • Heart Attack Brother    • Diabetes Child    • Psychiatry Child         hodgkins survivor     Social History     Social History   • Marital status:      Spouse name: N/A   • Number of children: N/A   • Years of education: N/A     Occupational History   • Not on file.     Social History Main Topics   • Smoking status: Never Smoker   • Smokeless tobacco: Never Used   • Alcohol use No   • Drug use: No   • Sexual activity: Not Currently     Partners: Male     Other Topics Concern   • Not on file     Social History Narrative   • No narrative on file     Allergies    Allergen Reactions   • Statins [Hmg-Coa-R Inhibitors]      Autoimmune hepatitis   • Codeine    • Hydrocodone    • Librium    • Lopressor [Kdc:Ci Pigment Blue 63+Metoprolol]      Does not tolerate beta blockers   • Sulphadimidine Sodium [Sulfamethazine Sodium]      Outpatient Encounter Prescriptions as of 12/6/2018   Medication Sig Dispense Refill   • azaTHIOprine (IMURAN) 50 MG Tab Take 50 mg by mouth every day.     • Diclofenac Sodium (VOLTAREN) 1 % Gel 1 Act by Apply externally route 1 time daily as needed.     • levothyroxine (SYNTHROID) 125 MCG Tab Take 1 Tab by mouth Every morning on an empty stomach. 90 Tab 3   • Diclofenac Sodium 1 % Gel Apply to affected areas of skin BID PRN arthritis pain 600 g 4   • lisinopril (PRINIVIL) 5 MG Tab Take 5 mg by mouth every day.     • [DISCONTINUED] estradiol (ESTRACE) 1 MG Tab Take 1 mg by mouth every day.     • [DISCONTINUED] montelukast (SINGULAIR) 10 MG Tab Take 10 mg by mouth every day. seasonal     • estradiol (ESTRACE) 1 MG Tab Take 1 Tab by mouth every day. 90 Tab 3   • montelukast (SINGULAIR) 10 MG Tab Take 1 Tab by mouth every day. 90 Tab 4   • lisinopril (PRINIVIL) 5 MG Tab Take 1 Tab by mouth every bedtime. 90 Tab 4   • Azathioprine 75 MG Tab Take 1 Tab by mouth every day. 90 Tab 4   • Cholecalciferol (VITAMIN D3) 2000 UNIT Cap Take 1 Cap by mouth every day.     • cyanocobalamin (VITAMIN B12) 1000 MCG Tab Take 1 Tab by mouth every day. 30 Tab 6   • fexofenadine (ALLEGRA) 180 MG tablet Take 180 mg by mouth 1 time daily as needed. Indications: Hayfever     • aspirin EC (ECOTRIN) 81 MG TBEC Take 81 mg by mouth every day.     • Calcium Carb-Cholecalciferol (CALCIUM 1000 + D) 1000-800 MG-UNIT TABS Take 1 Tab by mouth every day.     • docosahexanoic acid (OMEGA 3 FA) 1000 MG CAPS Take 1,000 mg by mouth 2 Times a Day.       No facility-administered encounter medications on file as of 12/6/2018.      Review of Systems   Constitutional: Negative for malaise/fatigue and  "weight loss.   HENT: Negative for congestion and hearing loss.    Eyes: Negative.    Respiratory: Negative.  Negative for cough and shortness of breath.    Cardiovascular: Negative for chest pain, palpitations and leg swelling.   Gastrointestinal: Negative for heartburn and nausea.   Musculoskeletal: Negative.    Neurological: Negative for dizziness and loss of consciousness.   Endo/Heme/Allergies: Does not bruise/bleed easily.   Psychiatric/Behavioral: The patient is not nervous/anxious and does not have insomnia.    All other systems reviewed and are negative.       Objective:   /60 (BP Location: Left arm, Patient Position: Sitting, BP Cuff Size: Adult)   Pulse 80   Ht 1.6 m (5' 3\")   Wt 58.5 kg (129 lb)   SpO2 96%   BMI 22.85 kg/m²     Physical Exam   Constitutional: She is oriented to person, place, and time. She appears well-developed and well-nourished.   HENT:   Head: Normocephalic and atraumatic.   Eyes: Pupils are equal, round, and reactive to light. EOM are normal.   Neck: No JVD present. No thyromegaly present.   Cardiovascular: Normal rate, regular rhythm and intact distal pulses.    No murmur heard.  Pulmonary/Chest: Breath sounds normal. No respiratory distress. She exhibits no tenderness.   Abdominal: Bowel sounds are normal. She exhibits no distension.   Musculoskeletal: She exhibits no edema or tenderness.   Lymphadenopathy:     She has no cervical adenopathy.   Neurological: She is alert and oriented to person, place, and time. She exhibits normal muscle tone. Coordination normal.   Skin: Skin is warm and dry. No rash noted.   Psychiatric: She has a normal mood and affect. Her behavior is normal.       Assessment:     1. Paroxysmal supraventricular tachycardia, details unknow - dx 3-5 y ago     2. Non-rheumatic mitral regurgitation     3. Essential hypertension         Medical Decision Making:  Today's Assessment / Status / Plan:     SVT  Looked at the images of her echo from last " year  Talked about strategies possible progression and what to look for if it returns  Follows lab work with her PCP, reviewed CBC CMP which is normal as well as thyroid axis    Mitral regurgitation  Not audible on exam today  Blood pressure excellent  Talked about physiology  Again looked at images of her echocardiogram which is normal and reassuring outside of mild to moderate mitral regurgitation, repeat echo per guidelines in 1-2 years sooner if concerns    Concerned about statin  She has autoimmune hepatitis, she is quite certain that this was caused by a statin  I referred her back to gastroenterology or even to her primary, again reviewed lab work including normal LFTs  Talked about pharmacology at length    RTC 1 year sooner if concerns

## 2018-12-20 ENCOUNTER — NON-PROVIDER VISIT (OUTPATIENT)
Dept: INTERNAL MEDICINE | Facility: IMAGING CENTER | Age: 73
End: 2018-12-20
Payer: MEDICARE

## 2018-12-20 DIAGNOSIS — R73.9 HYPERGLYCEMIA: ICD-10-CM

## 2018-12-20 LAB
HBA1C MFR BLD: 5.3 % (ref ?–5.8)
INT CON NEG: NORMAL
INT CON POS: NORMAL

## 2018-12-20 PROCEDURE — 83036 HEMOGLOBIN GLYCOSYLATED A1C: CPT | Performed by: FAMILY MEDICINE

## 2019-01-28 DIAGNOSIS — Z78.0 POSTMENOPAUSAL: ICD-10-CM

## 2019-01-28 DIAGNOSIS — M15.9 GENERALIZED OA: ICD-10-CM

## 2019-01-28 DIAGNOSIS — I10 ESSENTIAL HYPERTENSION: ICD-10-CM

## 2019-01-28 RX ORDER — LISINOPRIL 5 MG/1
5 TABLET ORAL DAILY
Qty: 90 TAB | Refills: 4 | Status: SHIPPED | OUTPATIENT
Start: 2019-01-28 | End: 2019-04-11 | Stop reason: SDUPTHER

## 2019-01-28 RX ORDER — ESTRADIOL 1 MG/1
1 TABLET ORAL DAILY
Qty: 90 TAB | Refills: 4 | Status: SHIPPED | OUTPATIENT
Start: 2019-01-28 | End: 2019-04-11 | Stop reason: SDUPTHER

## 2019-03-28 ENCOUNTER — HOSPITAL ENCOUNTER (OUTPATIENT)
Dept: LAB | Facility: MEDICAL CENTER | Age: 74
End: 2019-03-28
Attending: INTERNAL MEDICINE
Payer: COMMERCIAL

## 2019-03-28 LAB
ALBUMIN SERPL BCP-MCNC: 4.3 G/DL (ref 3.2–4.9)
ALBUMIN/GLOB SERPL: 1.4 G/DL
ALP SERPL-CCNC: 58 U/L (ref 30–99)
ALT SERPL-CCNC: 16 U/L (ref 2–50)
ANION GAP SERPL CALC-SCNC: 6 MMOL/L (ref 0–11.9)
AST SERPL-CCNC: 18 U/L (ref 12–45)
BASOPHILS # BLD AUTO: 1.5 % (ref 0–1.8)
BASOPHILS # BLD: 0.06 K/UL (ref 0–0.12)
BILIRUB SERPL-MCNC: 0.5 MG/DL (ref 0.1–1.5)
BUN SERPL-MCNC: 19 MG/DL (ref 8–22)
CALCIUM SERPL-MCNC: 9.5 MG/DL (ref 8.5–10.5)
CHLORIDE SERPL-SCNC: 105 MMOL/L (ref 96–112)
CO2 SERPL-SCNC: 28 MMOL/L (ref 20–33)
CREAT SERPL-MCNC: 0.77 MG/DL (ref 0.5–1.4)
EOSINOPHIL # BLD AUTO: 0.16 K/UL (ref 0–0.51)
EOSINOPHIL NFR BLD: 4 % (ref 0–6.9)
ERYTHROCYTE [DISTWIDTH] IN BLOOD BY AUTOMATED COUNT: 41.5 FL (ref 35.9–50)
GLOBULIN SER CALC-MCNC: 3 G/DL (ref 1.9–3.5)
GLUCOSE SERPL-MCNC: 81 MG/DL (ref 65–99)
HCT VFR BLD AUTO: 46.5 % (ref 37–47)
HGB BLD-MCNC: 15.2 G/DL (ref 12–16)
IMM GRANULOCYTES # BLD AUTO: 0.01 K/UL (ref 0–0.11)
IMM GRANULOCYTES NFR BLD AUTO: 0.3 % (ref 0–0.9)
INR PPP: 1.07 (ref 0.87–1.13)
LYMPHOCYTES # BLD AUTO: 0.78 K/UL (ref 1–4.8)
LYMPHOCYTES NFR BLD: 19.5 % (ref 22–41)
MCH RBC QN AUTO: 32.1 PG (ref 27–33)
MCHC RBC AUTO-ENTMCNC: 32.7 G/DL (ref 33.6–35)
MCV RBC AUTO: 98.3 FL (ref 81.4–97.8)
MONOCYTES # BLD AUTO: 0.47 K/UL (ref 0–0.85)
MONOCYTES NFR BLD AUTO: 11.8 % (ref 0–13.4)
NEUTROPHILS # BLD AUTO: 2.51 K/UL (ref 2–7.15)
NEUTROPHILS NFR BLD: 62.9 % (ref 44–72)
NRBC # BLD AUTO: 0 K/UL
NRBC BLD-RTO: 0 /100 WBC
PLATELET # BLD AUTO: 182 K/UL (ref 164–446)
PMV BLD AUTO: 11.4 FL (ref 9–12.9)
POTASSIUM SERPL-SCNC: 3.8 MMOL/L (ref 3.6–5.5)
PROT SERPL-MCNC: 7.3 G/DL (ref 6–8.2)
PROTHROMBIN TIME: 14 SEC (ref 12–14.6)
RBC # BLD AUTO: 4.73 M/UL (ref 4.2–5.4)
SODIUM SERPL-SCNC: 139 MMOL/L (ref 135–145)
WBC # BLD AUTO: 4 K/UL (ref 4.8–10.8)

## 2019-03-28 PROCEDURE — 80053 COMPREHEN METABOLIC PANEL: CPT

## 2019-03-28 PROCEDURE — 85025 COMPLETE CBC W/AUTO DIFF WBC: CPT

## 2019-03-28 PROCEDURE — 36415 COLL VENOUS BLD VENIPUNCTURE: CPT

## 2019-03-28 PROCEDURE — 85610 PROTHROMBIN TIME: CPT

## 2019-04-11 ENCOUNTER — OFFICE VISIT (OUTPATIENT)
Dept: INTERNAL MEDICINE | Facility: IMAGING CENTER | Age: 74
End: 2019-04-11
Payer: COMMERCIAL

## 2019-04-11 VITALS
WEIGHT: 129 LBS | TEMPERATURE: 97.7 F | SYSTOLIC BLOOD PRESSURE: 121 MMHG | RESPIRATION RATE: 15 BRPM | BODY MASS INDEX: 22.86 KG/M2 | OXYGEN SATURATION: 94 % | HEIGHT: 63 IN | DIASTOLIC BLOOD PRESSURE: 70 MMHG | HEART RATE: 66 BPM

## 2019-04-11 DIAGNOSIS — E53.8 B12 DEFICIENCY: ICD-10-CM

## 2019-04-11 DIAGNOSIS — J30.1 SEASONAL ALLERGIC RHINITIS DUE TO POLLEN: ICD-10-CM

## 2019-04-11 DIAGNOSIS — Z78.0 POSTMENOPAUSAL: ICD-10-CM

## 2019-04-11 DIAGNOSIS — K75.4 AUTOIMMUNE HEPATITIS (HCC): Chronic | ICD-10-CM

## 2019-04-11 DIAGNOSIS — M15.9 GENERALIZED OA: ICD-10-CM

## 2019-04-11 DIAGNOSIS — I10 ESSENTIAL HYPERTENSION: ICD-10-CM

## 2019-04-11 PROCEDURE — 99213 OFFICE O/P EST LOW 20 MIN: CPT | Performed by: FAMILY MEDICINE

## 2019-04-11 RX ORDER — LEVOTHYROXINE SODIUM 0.12 MG/1
125 TABLET ORAL
Qty: 90 TAB | Refills: 4 | Status: SHIPPED | OUTPATIENT
Start: 2019-04-11 | End: 2020-12-09 | Stop reason: CLARIF

## 2019-04-11 RX ORDER — AZATHIOPRINE 75 MG/1
75 TABLET ORAL DAILY
Qty: 90 TAB | Refills: 4 | Status: SHIPPED | OUTPATIENT
Start: 2019-04-11

## 2019-04-11 RX ORDER — LISINOPRIL 5 MG/1
5 TABLET ORAL DAILY
Qty: 90 TAB | Refills: 4 | Status: SHIPPED | OUTPATIENT
Start: 2019-04-11 | End: 2019-10-21

## 2019-04-11 RX ORDER — ESTRADIOL 1 MG/1
1 TABLET ORAL DAILY
Qty: 90 TAB | Refills: 4 | Status: SHIPPED | OUTPATIENT
Start: 2019-04-11 | End: 2020-06-18 | Stop reason: SDUPTHER

## 2019-04-11 RX ORDER — MONTELUKAST SODIUM 10 MG/1
10 TABLET ORAL DAILY
Qty: 90 TAB | Refills: 4 | Status: SHIPPED | OUTPATIENT
Start: 2019-04-11 | End: 2020-12-09

## 2019-04-12 PROBLEM — M15.9 GENERALIZED OA: Status: ACTIVE | Noted: 2019-04-12

## 2019-06-19 ENCOUNTER — HOSPITAL ENCOUNTER (OUTPATIENT)
Dept: LAB | Facility: MEDICAL CENTER | Age: 74
End: 2019-06-19
Attending: INTERNAL MEDICINE
Payer: COMMERCIAL

## 2019-06-19 LAB
ALBUMIN SERPL BCP-MCNC: 4.1 G/DL (ref 3.2–4.9)
ALBUMIN/GLOB SERPL: 1.4 G/DL
ALP SERPL-CCNC: 51 U/L (ref 30–99)
ALT SERPL-CCNC: 14 U/L (ref 2–50)
ANION GAP SERPL CALC-SCNC: 7 MMOL/L (ref 0–11.9)
AST SERPL-CCNC: 24 U/L (ref 12–45)
BASOPHILS # BLD AUTO: 1.3 % (ref 0–1.8)
BASOPHILS # BLD: 0.05 K/UL (ref 0–0.12)
BILIRUB SERPL-MCNC: 0.5 MG/DL (ref 0.1–1.5)
BUN SERPL-MCNC: 23 MG/DL (ref 8–22)
CALCIUM SERPL-MCNC: 10 MG/DL (ref 8.5–10.5)
CHLORIDE SERPL-SCNC: 107 MMOL/L (ref 96–112)
CO2 SERPL-SCNC: 27 MMOL/L (ref 20–33)
CREAT SERPL-MCNC: 0.91 MG/DL (ref 0.5–1.4)
EOSINOPHIL # BLD AUTO: 0.17 K/UL (ref 0–0.51)
EOSINOPHIL NFR BLD: 4.5 % (ref 0–6.9)
ERYTHROCYTE [DISTWIDTH] IN BLOOD BY AUTOMATED COUNT: 41.9 FL (ref 35.9–50)
GLOBULIN SER CALC-MCNC: 2.9 G/DL (ref 1.9–3.5)
GLUCOSE SERPL-MCNC: 65 MG/DL (ref 65–99)
HCT VFR BLD AUTO: 45.7 % (ref 37–47)
HGB BLD-MCNC: 14.8 G/DL (ref 12–16)
IMM GRANULOCYTES # BLD AUTO: 0.01 K/UL (ref 0–0.11)
IMM GRANULOCYTES NFR BLD AUTO: 0.3 % (ref 0–0.9)
INR PPP: 0.94 (ref 0.87–1.13)
LYMPHOCYTES # BLD AUTO: 0.67 K/UL (ref 1–4.8)
LYMPHOCYTES NFR BLD: 17.9 % (ref 22–41)
MCH RBC QN AUTO: 32.1 PG (ref 27–33)
MCHC RBC AUTO-ENTMCNC: 32.4 G/DL (ref 33.6–35)
MCV RBC AUTO: 99.1 FL (ref 81.4–97.8)
MONOCYTES # BLD AUTO: 0.42 K/UL (ref 0–0.85)
MONOCYTES NFR BLD AUTO: 11.2 % (ref 0–13.4)
NEUTROPHILS # BLD AUTO: 2.43 K/UL (ref 2–7.15)
NEUTROPHILS NFR BLD: 64.8 % (ref 44–72)
NRBC # BLD AUTO: 0 K/UL
NRBC BLD-RTO: 0 /100 WBC
PLATELET # BLD AUTO: 180 K/UL (ref 164–446)
PMV BLD AUTO: 11.2 FL (ref 9–12.9)
POTASSIUM SERPL-SCNC: 3.7 MMOL/L (ref 3.6–5.5)
PROT SERPL-MCNC: 7 G/DL (ref 6–8.2)
PROTHROMBIN TIME: 12.8 SEC (ref 12–14.6)
RBC # BLD AUTO: 4.61 M/UL (ref 4.2–5.4)
SODIUM SERPL-SCNC: 141 MMOL/L (ref 135–145)
WBC # BLD AUTO: 3.8 K/UL (ref 4.8–10.8)

## 2019-06-19 PROCEDURE — 80053 COMPREHEN METABOLIC PANEL: CPT

## 2019-06-19 PROCEDURE — 36415 COLL VENOUS BLD VENIPUNCTURE: CPT

## 2019-06-19 PROCEDURE — 85610 PROTHROMBIN TIME: CPT

## 2019-06-19 PROCEDURE — 85025 COMPLETE CBC W/AUTO DIFF WBC: CPT

## 2019-07-08 DIAGNOSIS — M15.9 GENERALIZED OA: ICD-10-CM

## 2019-07-11 ENCOUNTER — PATIENT MESSAGE (OUTPATIENT)
Dept: INTERNAL MEDICINE | Facility: IMAGING CENTER | Age: 74
End: 2019-07-11

## 2019-07-11 DIAGNOSIS — M15.9 GENERALIZED OA: ICD-10-CM

## 2019-10-10 ENCOUNTER — HOSPITAL ENCOUNTER (OUTPATIENT)
Dept: LAB | Facility: MEDICAL CENTER | Age: 74
End: 2019-10-10
Attending: INTERNAL MEDICINE
Payer: COMMERCIAL

## 2019-10-10 LAB
ALBUMIN SERPL BCP-MCNC: 4.3 G/DL (ref 3.2–4.9)
ALBUMIN/GLOB SERPL: 1.3 G/DL
ALP SERPL-CCNC: 52 U/L (ref 30–99)
ALT SERPL-CCNC: 16 U/L (ref 2–50)
ANION GAP SERPL CALC-SCNC: 7 MMOL/L (ref 0–11.9)
AST SERPL-CCNC: 20 U/L (ref 12–45)
BASOPHILS # BLD AUTO: 1.7 % (ref 0–1.8)
BASOPHILS # BLD: 0.07 K/UL (ref 0–0.12)
BILIRUB SERPL-MCNC: 0.6 MG/DL (ref 0.1–1.5)
BUN SERPL-MCNC: 17 MG/DL (ref 8–22)
CALCIUM SERPL-MCNC: 9.5 MG/DL (ref 8.5–10.5)
CHLORIDE SERPL-SCNC: 106 MMOL/L (ref 96–112)
CO2 SERPL-SCNC: 28 MMOL/L (ref 20–33)
CREAT SERPL-MCNC: 0.88 MG/DL (ref 0.5–1.4)
EOSINOPHIL # BLD AUTO: 0.28 K/UL (ref 0–0.51)
EOSINOPHIL NFR BLD: 6.7 % (ref 0–6.9)
ERYTHROCYTE [DISTWIDTH] IN BLOOD BY AUTOMATED COUNT: 41.9 FL (ref 35.9–50)
FASTING STATUS PATIENT QL REPORTED: NORMAL
GLOBULIN SER CALC-MCNC: 3.4 G/DL (ref 1.9–3.5)
GLUCOSE SERPL-MCNC: 78 MG/DL (ref 65–99)
HCT VFR BLD AUTO: 47.6 % (ref 37–47)
HGB BLD-MCNC: 15.4 G/DL (ref 12–16)
IMM GRANULOCYTES # BLD AUTO: 0.01 K/UL (ref 0–0.11)
IMM GRANULOCYTES NFR BLD AUTO: 0.2 % (ref 0–0.9)
INR PPP: 0.94 (ref 0.87–1.13)
LYMPHOCYTES # BLD AUTO: 0.69 K/UL (ref 1–4.8)
LYMPHOCYTES NFR BLD: 16.5 % (ref 22–41)
MCH RBC QN AUTO: 32.3 PG (ref 27–33)
MCHC RBC AUTO-ENTMCNC: 32.4 G/DL (ref 33.6–35)
MCV RBC AUTO: 99.8 FL (ref 81.4–97.8)
MONOCYTES # BLD AUTO: 0.46 K/UL (ref 0–0.85)
MONOCYTES NFR BLD AUTO: 11 % (ref 0–13.4)
NEUTROPHILS # BLD AUTO: 2.68 K/UL (ref 2–7.15)
NEUTROPHILS NFR BLD: 63.9 % (ref 44–72)
NRBC # BLD AUTO: 0 K/UL
NRBC BLD-RTO: 0 /100 WBC
PLATELET # BLD AUTO: 183 K/UL (ref 164–446)
PMV BLD AUTO: 11.2 FL (ref 9–12.9)
POTASSIUM SERPL-SCNC: 3.6 MMOL/L (ref 3.6–5.5)
PROT SERPL-MCNC: 7.7 G/DL (ref 6–8.2)
PROTHROMBIN TIME: 12.8 SEC (ref 12–14.6)
RBC # BLD AUTO: 4.77 M/UL (ref 4.2–5.4)
SODIUM SERPL-SCNC: 141 MMOL/L (ref 135–145)
WBC # BLD AUTO: 4.2 K/UL (ref 4.8–10.8)

## 2019-10-10 PROCEDURE — 80053 COMPREHEN METABOLIC PANEL: CPT

## 2019-10-10 PROCEDURE — 85610 PROTHROMBIN TIME: CPT

## 2019-10-10 PROCEDURE — 36415 COLL VENOUS BLD VENIPUNCTURE: CPT

## 2019-10-10 PROCEDURE — 85025 COMPLETE CBC W/AUTO DIFF WBC: CPT

## 2019-10-21 ENCOUNTER — OFFICE VISIT (OUTPATIENT)
Dept: CARDIOLOGY | Facility: MEDICAL CENTER | Age: 74
End: 2019-10-21
Payer: COMMERCIAL

## 2019-10-21 VITALS
HEART RATE: 70 BPM | BODY MASS INDEX: 23.39 KG/M2 | HEIGHT: 63 IN | OXYGEN SATURATION: 93 % | DIASTOLIC BLOOD PRESSURE: 62 MMHG | WEIGHT: 132 LBS | SYSTOLIC BLOOD PRESSURE: 124 MMHG

## 2019-10-21 DIAGNOSIS — I47.10 PAROXYSMAL SUPRAVENTRICULAR TACHYCARDIA: Chronic | ICD-10-CM

## 2019-10-21 DIAGNOSIS — I34.0 MITRAL VALVE INSUFFICIENCY, UNSPECIFIED ETIOLOGY: Chronic | ICD-10-CM

## 2019-10-21 DIAGNOSIS — I10 ESSENTIAL HYPERTENSION: ICD-10-CM

## 2019-10-21 PROCEDURE — 99214 OFFICE O/P EST MOD 30 MIN: CPT | Performed by: INTERNAL MEDICINE

## 2019-10-21 RX ORDER — DILTIAZEM HYDROCHLORIDE 120 MG/1
120 CAPSULE, COATED, EXTENDED RELEASE ORAL DAILY
Qty: 30 CAP | Refills: 3 | Status: SHIPPED | OUTPATIENT
Start: 2019-10-21 | End: 2020-02-11

## 2019-10-21 ASSESSMENT — ENCOUNTER SYMPTOMS
GASTROINTESTINAL NEGATIVE: 1
RESPIRATORY NEGATIVE: 1
SHORTNESS OF BREATH: 0
CONSTITUTIONAL NEGATIVE: 1
NEUROLOGICAL NEGATIVE: 1
PALPITATIONS: 1
DEPRESSION: 0
MUSCULOSKELETAL NEGATIVE: 1

## 2019-10-21 NOTE — PROGRESS NOTES
Chief Complaint   Patient presents with   • Supraventricular Tachycardia (SVT)     Previous patient        Subjective:   Angela Davis is a 74 y.o. female who presents today for follow-up of hypertension, history of SVT, and history of chest pain.  She has had a long history of chest pain and had a stress echo 2017 that was unremarkable.  She has history of chest pain for many years.  However in reviewing her story she has chest pain with activity such as dragging the garbage can back up from the street.  She has history of SVT and has not tolerated beta-blockers in the past.    Past Medical History:   Diagnosis Date   • Autoimmune hepatitis (HCC)    • B12 deficiency    • Congenital malrotation of intestine    • Fatigue 5/22/2013   • Other chest pain 4/19/2013   • Paroxysmal supraventricular tachycardia, details unknow - dx 3-5 y ago 4/19/2013   • Thyroid disease      Past Surgical History:   Procedure Laterality Date   • LUMPECTOMY  1982    right breast   • ABDOMINAL HYSTERECTOMY TOTAL  1970   • HERNIA REPAIR  1970    umbilical   • TONSILLECTOMY  1954   • APPENDECTOMY  1950   • OTHER ABDOMINAL SURGERY  1965-66    congenital malrotation of intestines   • PRIMARY C SECTION  1968/1970     Family History   Problem Relation Age of Onset   • Heart Disease Mother    • Cancer Mother    • Heart Disease Father    • Cancer Father    • Heart Disease Sister    • Heart Attack Sister    • Heart Disease Brother    • Heart Attack Brother    • Heart Disease Brother    • Heart Attack Brother    • Diabetes Child    • Psychiatric Illness Child         hodgkins survivor     Social History     Socioeconomic History   • Marital status:      Spouse name: Not on file   • Number of children: Not on file   • Years of education: Not on file   • Highest education level: Not on file   Occupational History   • Not on file   Social Needs   • Financial resource strain: Not on file   • Food insecurity:     Worry: Not on file      Inability: Not on file   • Transportation needs:     Medical: Not on file     Non-medical: Not on file   Tobacco Use   • Smoking status: Never Smoker   • Smokeless tobacco: Never Used   Substance and Sexual Activity   • Alcohol use: No   • Drug use: No   • Sexual activity: Not Currently     Partners: Male   Lifestyle   • Physical activity:     Days per week: Not on file     Minutes per session: Not on file   • Stress: Not on file   Relationships   • Social connections:     Talks on phone: Not on file     Gets together: Not on file     Attends Yazidism service: Not on file     Active member of club or organization: Not on file     Attends meetings of clubs or organizations: Not on file     Relationship status: Not on file   • Intimate partner violence:     Fear of current or ex partner: Not on file     Emotionally abused: Not on file     Physically abused: Not on file     Forced sexual activity: Not on file   Other Topics Concern   • Not on file   Social History Narrative   • Not on file     Allergies   Allergen Reactions   • Statins [Hmg-Coa-R Inhibitors]      Autoimmune hepatitis   • Codeine    • Hydrocodone    • Librium    • Lopressor [Kdc:Ci Pigment Blue 63+Metoprolol]      Does not tolerate beta blockers   • Sulphadimidine Sodium [Sulfamethazine Sodium]      Outpatient Encounter Medications as of 10/21/2019   Medication Sig Dispense Refill   • DILTIAZem CD (CARDIZEM CD) 120 MG CAPSULE SR 24 HR Take 1 Cap by mouth every day. 30 Cap 3   • Diclofenac Sodium (VOLTAREN) 1 % Gel 3 g by Apply externally route 2 Times a Day. 6 Tube 6   • estradiol (ESTRACE) 1 MG Tab Take 1 Tab by mouth every day. 90 Tab 4   • montelukast (SINGULAIR) 10 MG Tab Take 1 Tab by mouth every day. 90 Tab 4   • levothyroxine (SYNTHROID) 125 MCG Tab Take 1 Tab by mouth Every morning on an empty stomach. 90 Tab 4   • Azathioprine 75 MG Tab Take 1 Tab by mouth every day. 90 Tab 4   • aspirin EC (ECOTRIN) 81 MG Tablet Delayed Response Take 1 Tab by  "mouth every day. 90 Tab 4   • Cholecalciferol (VITAMIN D3) 2000 UNIT Cap Take 1 Cap by mouth every day.     • fexofenadine (ALLEGRA) 180 MG tablet Take 180 mg by mouth 1 time daily as needed. Indications: Hayfever     • Calcium Carb-Cholecalciferol (CALCIUM 1000 + D) 1000-800 MG-UNIT TABS Take 1 Tab by mouth every day.     • docosahexanoic acid (OMEGA 3 FA) 1000 MG CAPS Take 1,000 mg by mouth 2 Times a Day.     • cyanocobalamin (VITAMIN B12) 1000 MCG Tab Take 1 Tab by mouth every day. (Patient not taking: Reported on 10/21/2019) 90 Tab 4   • [DISCONTINUED] lisinopril (PRINIVIL) 5 MG Tab Take 1 Tab by mouth every day. 90 Tab 4     No facility-administered encounter medications on file as of 10/21/2019.      Review of Systems   Constitutional: Negative.    HENT: Negative.    Respiratory: Negative.  Negative for shortness of breath.    Cardiovascular: Positive for chest pain and palpitations.   Gastrointestinal: Negative.    Musculoskeletal: Negative.    Skin: Negative.    Neurological: Negative.    Endo/Heme/Allergies: Negative.    Psychiatric/Behavioral: Negative for depression.        Objective:   /62 (BP Location: Right arm, Patient Position: Sitting, BP Cuff Size: Adult)   Pulse 70   Ht 1.6 m (5' 3\")   Wt 59.9 kg (132 lb)   SpO2 93%   BMI 23.38 kg/m²     Physical Exam   Constitutional: She is oriented to person, place, and time. No distress.   HENT:   Head: Normocephalic and atraumatic.   Eyes: Pupils are equal, round, and reactive to light. No scleral icterus.   Neck: No JVD present.   Cardiovascular: Normal rate and regular rhythm.   Murmur heard.   Systolic murmur is present with a grade of 2/6.  Pulmonary/Chest: No respiratory distress. She has no wheezes.   Abdominal: Soft. She exhibits no distension. There is no tenderness.   Musculoskeletal: She exhibits no edema.   Neurological: She is alert and oriented to person, place, and time.   Skin: Skin is warm.   Psychiatric: She has a normal mood and " affect.       Assessment:     1. Essential hypertension     2. Paroxysmal supraventricular tachycardia, details unknow - dx 3-5 y ago     3. Mitral valve insufficiency, unspecified etiology         Medical Decision Making:  Today's Assessment / Status / Plan:   Hypertension: Under good control on current medications but has history of SVT.  We will change her to diltiazem sustained-release 120 mg a day and reevaluate in 3 months.  Chest pain: History of noncardiac chest pain in the past but symptoms been suspicious.  Consider stress testing next visit.    History of mitral sufficiency mild to moderate by prior echo.  No heart failure on exam.  Continue current medications.

## 2019-11-13 ENCOUNTER — HOSPITAL ENCOUNTER (OUTPATIENT)
Dept: RADIOLOGY | Facility: MEDICAL CENTER | Age: 74
End: 2019-11-13
Attending: INTERNAL MEDICINE
Payer: COMMERCIAL

## 2019-11-13 DIAGNOSIS — K74.60 HEPATIC CIRRHOSIS, UNSPECIFIED HEPATIC CIRRHOSIS TYPE, UNSPECIFIED WHETHER ASCITES PRESENT (HCC): ICD-10-CM

## 2019-11-13 DIAGNOSIS — K75.4 AUTOIMMUNE HEPATITIS (HCC): ICD-10-CM

## 2019-11-13 PROCEDURE — 76700 US EXAM ABDOM COMPLETE: CPT

## 2020-01-13 ENCOUNTER — HOSPITAL ENCOUNTER (OUTPATIENT)
Dept: RADIOLOGY | Facility: MEDICAL CENTER | Age: 75
End: 2020-01-13
Attending: FAMILY MEDICINE
Payer: COMMERCIAL

## 2020-01-13 DIAGNOSIS — Z12.31 VISIT FOR SCREENING MAMMOGRAM: ICD-10-CM

## 2020-01-13 PROCEDURE — 77067 SCR MAMMO BI INCL CAD: CPT

## 2020-01-31 ENCOUNTER — OFFICE VISIT (OUTPATIENT)
Dept: CARDIOLOGY | Facility: MEDICAL CENTER | Age: 75
End: 2020-01-31
Payer: COMMERCIAL

## 2020-01-31 VITALS
SYSTOLIC BLOOD PRESSURE: 120 MMHG | RESPIRATION RATE: 14 BRPM | DIASTOLIC BLOOD PRESSURE: 60 MMHG | WEIGHT: 131 LBS | OXYGEN SATURATION: 93 % | HEIGHT: 63 IN | BODY MASS INDEX: 23.21 KG/M2 | HEART RATE: 72 BPM

## 2020-01-31 DIAGNOSIS — I47.10 PAROXYSMAL SUPRAVENTRICULAR TACHYCARDIA (HCC): Chronic | ICD-10-CM

## 2020-01-31 DIAGNOSIS — I10 ESSENTIAL HYPERTENSION: ICD-10-CM

## 2020-01-31 DIAGNOSIS — I34.0 MITRAL VALVE INSUFFICIENCY, UNSPECIFIED ETIOLOGY: Chronic | ICD-10-CM

## 2020-01-31 PROCEDURE — 99214 OFFICE O/P EST MOD 30 MIN: CPT | Performed by: INTERNAL MEDICINE

## 2020-01-31 ASSESSMENT — ENCOUNTER SYMPTOMS
RESPIRATORY NEGATIVE: 1
PALPITATIONS: 0
BACK PAIN: 1
DEPRESSION: 0
GASTROINTESTINAL NEGATIVE: 1
CARDIOVASCULAR NEGATIVE: 1
NEUROLOGICAL NEGATIVE: 1
CONSTITUTIONAL NEGATIVE: 1

## 2020-01-31 NOTE — PROGRESS NOTES
No chief complaint on file.      Subjective:   She Davis is a 74 y.o. female who presents today for follow-up of hypertension, SVT and mild mitral insufficiency.  Since starting her on diltiazem, her SVT has been totally controlled.  She is had no palpitations at all.  She can ambulate without any problems.  The patient and her  walks daily she is had no exertional dyspnea or chest pain.  She has a history of mild to moderate mitral insufficiency.  No other interval problems she does have severe chronic coccygeal pain of uncertain cause.    Past Medical History:   Diagnosis Date   • Autoimmune hepatitis (HCC)    • B12 deficiency    • Congenital malrotation of intestine    • Fatigue 5/22/2013   • Other chest pain 4/19/2013   • Paroxysmal supraventricular tachycardia, details unknow - dx 3-5 y ago 4/19/2013   • Thyroid disease      Past Surgical History:   Procedure Laterality Date   • LUMPECTOMY  1982    right breast   • ABDOMINAL HYSTERECTOMY TOTAL  1970   • HERNIA REPAIR  1970    umbilical   • TONSILLECTOMY  1954   • APPENDECTOMY  1950   • OTHER ABDOMINAL SURGERY  1965-66    congenital malrotation of intestines   • PRIMARY C SECTION  1968/1970     Family History   Problem Relation Age of Onset   • Heart Disease Mother    • Cancer Mother    • Heart Disease Father    • Cancer Father    • Heart Disease Sister    • Heart Attack Sister    • Heart Disease Brother    • Heart Attack Brother    • Heart Disease Brother    • Heart Attack Brother    • Diabetes Child    • Psychiatric Illness Child         hodgkins survivor     Social History     Socioeconomic History   • Marital status:      Spouse name: Not on file   • Number of children: Not on file   • Years of education: Not on file   • Highest education level: Not on file   Occupational History   • Not on file   Social Needs   • Financial resource strain: Not on file   • Food insecurity:     Worry: Not on file     Inability: Not on file   •  Transportation needs:     Medical: Not on file     Non-medical: Not on file   Tobacco Use   • Smoking status: Never Smoker   • Smokeless tobacco: Never Used   Substance and Sexual Activity   • Alcohol use: No   • Drug use: No   • Sexual activity: Not Currently     Partners: Male   Lifestyle   • Physical activity:     Days per week: Not on file     Minutes per session: Not on file   • Stress: Not on file   Relationships   • Social connections:     Talks on phone: Not on file     Gets together: Not on file     Attends Anabaptist service: Not on file     Active member of club or organization: Not on file     Attends meetings of clubs or organizations: Not on file     Relationship status: Not on file   • Intimate partner violence:     Fear of current or ex partner: Not on file     Emotionally abused: Not on file     Physically abused: Not on file     Forced sexual activity: Not on file   Other Topics Concern   • Not on file   Social History Narrative   • Not on file     Allergies   Allergen Reactions   • Statins [Hmg-Coa-R Inhibitors]      Autoimmune hepatitis   • Codeine    • Hydrocodone    • Librium    • Lopressor [Kdc:Ci Pigment Blue 63+Metoprolol]      Does not tolerate beta blockers   • Sulphadimidine Sodium [Sulfamethazine Sodium]      Outpatient Encounter Medications as of 1/31/2020   Medication Sig Dispense Refill   • DILTIAZem CD (CARDIZEM CD) 120 MG CAPSULE SR 24 HR Take 1 Cap by mouth every day. 30 Cap 3   • Diclofenac Sodium (VOLTAREN) 1 % Gel 3 g by Apply externally route 2 Times a Day. 6 Tube 6   • estradiol (ESTRACE) 1 MG Tab Take 1 Tab by mouth every day. 90 Tab 4   • montelukast (SINGULAIR) 10 MG Tab Take 1 Tab by mouth every day. 90 Tab 4   • levothyroxine (SYNTHROID) 125 MCG Tab Take 1 Tab by mouth Every morning on an empty stomach. 90 Tab 4   • Azathioprine 75 MG Tab Take 1 Tab by mouth every day. 90 Tab 4   • aspirin EC (ECOTRIN) 81 MG Tablet Delayed Response Take 1 Tab by mouth every day. 90 Tab 4  "  • cyanocobalamin (VITAMIN B12) 1000 MCG Tab Take 1 Tab by mouth every day. 90 Tab 4   • Cholecalciferol (VITAMIN D3) 2000 UNIT Cap Take 1 Cap by mouth every day.     • fexofenadine (ALLEGRA) 180 MG tablet Take 180 mg by mouth 1 time daily as needed. Indications: Hayfever     • Calcium Carb-Cholecalciferol (CALCIUM 1000 + D) 1000-800 MG-UNIT TABS Take 1 Tab by mouth every day.     • docosahexanoic acid (OMEGA 3 FA) 1000 MG CAPS Take 1,000 mg by mouth 2 Times a Day.       No facility-administered encounter medications on file as of 1/31/2020.      Review of Systems   Constitutional: Negative.    HENT: Negative.    Respiratory: Negative.    Cardiovascular: Negative.  Negative for chest pain and palpitations.   Gastrointestinal: Negative.    Musculoskeletal: Positive for back pain.        Chronic coccygeal pain   Skin: Negative.    Neurological: Negative.    Endo/Heme/Allergies: Negative.    Psychiatric/Behavioral: Negative for depression.        Objective:   /60 (BP Location: Right arm, Patient Position: Sitting)   Pulse 72   Resp 14   Ht 1.6 m (5' 3\")   Wt 59.4 kg (131 lb)   SpO2 93%   BMI 23.21 kg/m²     Physical Exam   Constitutional: She is oriented to person, place, and time. No distress.   The patient is examined today and her physical examination is unchanged from October 21, 2019.   HENT:   Head: Normocephalic and atraumatic.   Eyes: Pupils are equal, round, and reactive to light. No scleral icterus.   Neck: No JVD present.   Cardiovascular: Normal rate and regular rhythm.   Murmur heard.   Systolic murmur is present with a grade of 2/6.  Pulmonary/Chest: No respiratory distress. She has no wheezes.   Abdominal: Soft. She exhibits no distension. There is no tenderness.   Musculoskeletal:         General: No edema.   Neurological: She is alert and oriented to person, place, and time.   Skin: Skin is warm.   Psychiatric: She has a normal mood and affect.       Assessment:     1. Essential " hypertension     2. Mitral valve insufficiency, unspecified etiology     3. Paroxysmal supraventricular tachycardia, details unknow - dx 3-5 y ago         Medical Decision Making:  Today's Assessment / Status / Plan:   SVT: Completely controlled on low-dose diltiazem.  Mild to moderate mitral sufficiency, repeat echo in approximately 1 year.  Hypertension.  By my reading her blood pressure is not optimally controlled.  Systolics are in the 138 range.  Her  has a pile of losartan 50 mg tablets at home she will start on 25 mg a day.  Continue diltiazem.  Return in 6 weeks for blood pressure check

## 2020-02-11 DIAGNOSIS — I10 ESSENTIAL HYPERTENSION: Primary | ICD-10-CM

## 2020-02-11 RX ORDER — DILTIAZEM HYDROCHLORIDE 120 MG/1
120 CAPSULE, COATED, EXTENDED RELEASE ORAL DAILY
Qty: 30 CAP | Refills: 3 | Status: SHIPPED | OUTPATIENT
Start: 2020-02-11 | End: 2020-03-23 | Stop reason: SDUPTHER

## 2020-02-19 DIAGNOSIS — I10 ESSENTIAL HYPERTENSION: ICD-10-CM

## 2020-02-19 DIAGNOSIS — E55.9 VITAMIN D DEFICIENCY: ICD-10-CM

## 2020-02-19 DIAGNOSIS — D75.89 MACROCYTOSIS: ICD-10-CM

## 2020-02-19 DIAGNOSIS — E03.9 ACQUIRED HYPOTHYROIDISM: Chronic | ICD-10-CM

## 2020-02-19 DIAGNOSIS — R73.9 HYPERGLYCEMIA: ICD-10-CM

## 2020-02-19 DIAGNOSIS — E78.2 MIXED DYSLIPIDEMIA: ICD-10-CM

## 2020-02-19 PROBLEM — E53.8 B12 DEFICIENCY: Status: RESOLVED | Noted: 2017-05-30 | Resolved: 2020-02-19

## 2020-02-26 ENCOUNTER — HOSPITAL ENCOUNTER (OUTPATIENT)
Dept: LAB | Facility: MEDICAL CENTER | Age: 75
End: 2020-02-26
Attending: FAMILY MEDICINE
Payer: COMMERCIAL

## 2020-02-26 DIAGNOSIS — D75.89 MACROCYTOSIS: ICD-10-CM

## 2020-02-26 DIAGNOSIS — R73.9 HYPERGLYCEMIA: ICD-10-CM

## 2020-02-26 DIAGNOSIS — E55.9 VITAMIN D DEFICIENCY: ICD-10-CM

## 2020-02-26 DIAGNOSIS — E03.9 ACQUIRED HYPOTHYROIDISM: Chronic | ICD-10-CM

## 2020-02-26 DIAGNOSIS — E78.2 MIXED DYSLIPIDEMIA: ICD-10-CM

## 2020-02-26 DIAGNOSIS — I10 ESSENTIAL HYPERTENSION: ICD-10-CM

## 2020-02-26 LAB
ALBUMIN SERPL BCP-MCNC: 4.7 G/DL (ref 3.2–4.9)
ALBUMIN/GLOB SERPL: 1.5 G/DL
ALP SERPL-CCNC: 52 U/L (ref 30–99)
ALT SERPL-CCNC: 16 U/L (ref 2–50)
ANION GAP SERPL CALC-SCNC: 8 MMOL/L (ref 0–11.9)
AST SERPL-CCNC: 21 U/L (ref 12–45)
BASOPHILS # BLD AUTO: 1.6 % (ref 0–1.8)
BASOPHILS # BLD: 0.06 K/UL (ref 0–0.12)
BILIRUB SERPL-MCNC: 0.7 MG/DL (ref 0.1–1.5)
BUN SERPL-MCNC: 18 MG/DL (ref 8–22)
CALCIUM SERPL-MCNC: 10.1 MG/DL (ref 8.5–10.5)
CHLORIDE SERPL-SCNC: 103 MMOL/L (ref 96–112)
CHOLEST SERPL-MCNC: 238 MG/DL (ref 100–199)
CO2 SERPL-SCNC: 29 MMOL/L (ref 20–33)
CREAT SERPL-MCNC: 0.97 MG/DL (ref 0.5–1.4)
EOSINOPHIL # BLD AUTO: 0.14 K/UL (ref 0–0.51)
EOSINOPHIL NFR BLD: 3.7 % (ref 0–6.9)
ERYTHROCYTE [DISTWIDTH] IN BLOOD BY AUTOMATED COUNT: 42.6 FL (ref 35.9–50)
EST. AVERAGE GLUCOSE BLD GHB EST-MCNC: 120 MG/DL
GLOBULIN SER CALC-MCNC: 3.2 G/DL (ref 1.9–3.5)
GLUCOSE SERPL-MCNC: 67 MG/DL (ref 65–99)
HBA1C MFR BLD: 5.8 % (ref 0–5.6)
HCT VFR BLD AUTO: 48.6 % (ref 37–47)
HDLC SERPL-MCNC: 78 MG/DL
HGB BLD-MCNC: 16.1 G/DL (ref 12–16)
IMM GRANULOCYTES # BLD AUTO: 0 K/UL (ref 0–0.11)
IMM GRANULOCYTES NFR BLD AUTO: 0 % (ref 0–0.9)
LDLC SERPL CALC-MCNC: 138 MG/DL
LYMPHOCYTES # BLD AUTO: 0.66 K/UL (ref 1–4.8)
LYMPHOCYTES NFR BLD: 17.2 % (ref 22–41)
MCH RBC QN AUTO: 33.3 PG (ref 27–33)
MCHC RBC AUTO-ENTMCNC: 33.1 G/DL (ref 33.6–35)
MCV RBC AUTO: 100.4 FL (ref 81.4–97.8)
MONOCYTES # BLD AUTO: 0.45 K/UL (ref 0–0.85)
MONOCYTES NFR BLD AUTO: 11.7 % (ref 0–13.4)
NEUTROPHILS # BLD AUTO: 2.52 K/UL (ref 2–7.15)
NEUTROPHILS NFR BLD: 65.8 % (ref 44–72)
NRBC # BLD AUTO: 0 K/UL
NRBC BLD-RTO: 0 /100 WBC
PLATELET # BLD AUTO: 196 K/UL (ref 164–446)
PMV BLD AUTO: 10.9 FL (ref 9–12.9)
POTASSIUM SERPL-SCNC: 3.8 MMOL/L (ref 3.6–5.5)
PROT SERPL-MCNC: 7.9 G/DL (ref 6–8.2)
RBC # BLD AUTO: 4.84 M/UL (ref 4.2–5.4)
SODIUM SERPL-SCNC: 140 MMOL/L (ref 135–145)
TRIGL SERPL-MCNC: 108 MG/DL (ref 0–149)
WBC # BLD AUTO: 3.8 K/UL (ref 4.8–10.8)

## 2020-02-26 PROCEDURE — 84439 ASSAY OF FREE THYROXINE: CPT

## 2020-02-26 PROCEDURE — 80053 COMPREHEN METABOLIC PANEL: CPT

## 2020-02-26 PROCEDURE — 36415 COLL VENOUS BLD VENIPUNCTURE: CPT

## 2020-02-26 PROCEDURE — 85025 COMPLETE CBC W/AUTO DIFF WBC: CPT

## 2020-02-26 PROCEDURE — 82607 VITAMIN B-12: CPT

## 2020-02-26 PROCEDURE — 82306 VITAMIN D 25 HYDROXY: CPT

## 2020-02-26 PROCEDURE — 83036 HEMOGLOBIN GLYCOSYLATED A1C: CPT

## 2020-02-26 PROCEDURE — 80061 LIPID PANEL: CPT

## 2020-02-26 PROCEDURE — 84443 ASSAY THYROID STIM HORMONE: CPT

## 2020-02-26 PROCEDURE — 82746 ASSAY OF FOLIC ACID SERUM: CPT

## 2020-02-27 LAB
25(OH)D3 SERPL-MCNC: 51 NG/ML (ref 30–100)
FOLATE SERPL-MCNC: >24 NG/ML
T4 FREE SERPL-MCNC: 0.99 NG/DL (ref 0.53–1.43)
TSH SERPL DL<=0.005 MIU/L-ACNC: 6.39 UIU/ML (ref 0.38–5.33)
VIT B12 SERPL-MCNC: 175 PG/ML (ref 211–911)

## 2020-03-02 ENCOUNTER — OFFICE VISIT (OUTPATIENT)
Dept: INTERNAL MEDICINE | Facility: IMAGING CENTER | Age: 75
End: 2020-03-02
Payer: COMMERCIAL

## 2020-03-02 VITALS
DIASTOLIC BLOOD PRESSURE: 61 MMHG | HEIGHT: 63 IN | OXYGEN SATURATION: 93 % | HEART RATE: 71 BPM | RESPIRATION RATE: 17 BRPM | TEMPERATURE: 98.6 F | SYSTOLIC BLOOD PRESSURE: 105 MMHG | WEIGHT: 126.2 LBS | BODY MASS INDEX: 22.36 KG/M2

## 2020-03-02 DIAGNOSIS — Z71.85 VACCINE COUNSELING: ICD-10-CM

## 2020-03-02 DIAGNOSIS — H61.21 IMPACTED CERUMEN OF RIGHT EAR: ICD-10-CM

## 2020-03-02 DIAGNOSIS — Z00.00 ENCOUNTER FOR MEDICARE ANNUAL WELLNESS EXAM: ICD-10-CM

## 2020-03-02 DIAGNOSIS — K75.4 AUTOIMMUNE HEPATITIS (HCC): Chronic | ICD-10-CM

## 2020-03-02 DIAGNOSIS — E53.8 B12 DEFICIENCY: ICD-10-CM

## 2020-03-02 DIAGNOSIS — E03.9 ACQUIRED HYPOTHYROIDISM: ICD-10-CM

## 2020-03-02 DIAGNOSIS — M15.9 GENERALIZED OA: ICD-10-CM

## 2020-03-02 DIAGNOSIS — I10 ESSENTIAL HYPERTENSION: ICD-10-CM

## 2020-03-02 DIAGNOSIS — I34.0 MITRAL VALVE INSUFFICIENCY, UNSPECIFIED ETIOLOGY: Chronic | ICD-10-CM

## 2020-03-02 DIAGNOSIS — M85.89 OSTEOPENIA OF MULTIPLE SITES: Chronic | ICD-10-CM

## 2020-03-02 DIAGNOSIS — R73.09 ELEVATED HEMOGLOBIN A1C: ICD-10-CM

## 2020-03-02 PROCEDURE — G0439 PPPS, SUBSEQ VISIT: HCPCS | Performed by: FAMILY MEDICINE

## 2020-03-02 RX ORDER — SYRINGE W-NEEDLE,DISPOSAB,3 ML 25GX5/8"
SYRINGE, EMPTY DISPOSABLE MISCELLANEOUS
Qty: 10 EACH | Refills: 3 | Status: SHIPPED | OUTPATIENT
Start: 2020-03-02 | End: 2021-04-27

## 2020-03-02 RX ORDER — CYANOCOBALAMIN 1000 UG/ML
1000 INJECTION, SOLUTION INTRAMUSCULAR; SUBCUTANEOUS
Qty: 4 ML | Refills: 3 | Status: SHIPPED | OUTPATIENT
Start: 2020-03-02 | End: 2020-03-24

## 2020-03-02 ASSESSMENT — FIBROSIS 4 INDEX: FIB4 SCORE: 1.96

## 2020-03-02 ASSESSMENT — PATIENT HEALTH QUESTIONNAIRE - PHQ9: CLINICAL INTERPRETATION OF PHQ2 SCORE: 0

## 2020-03-02 ASSESSMENT — ENCOUNTER SYMPTOMS: GENERAL WELL-BEING: GOOD

## 2020-03-02 ASSESSMENT — ACTIVITIES OF DAILY LIVING (ADL): BATHING_REQUIRES_ASSISTANCE: 0

## 2020-03-23 ENCOUNTER — TELEPHONE (OUTPATIENT)
Dept: CARDIOLOGY | Facility: MEDICAL CENTER | Age: 75
End: 2020-03-23

## 2020-03-23 DIAGNOSIS — I10 ESSENTIAL HYPERTENSION: ICD-10-CM

## 2020-03-23 RX ORDER — LOSARTAN POTASSIUM 25 MG/1
25 TABLET ORAL DAILY
Qty: 90 TAB | Refills: 3 | Status: SHIPPED | OUTPATIENT
Start: 2020-03-23 | End: 2021-02-19

## 2020-03-23 RX ORDER — DILTIAZEM HYDROCHLORIDE 120 MG/1
120 CAPSULE, COATED, EXTENDED RELEASE ORAL DAILY
Qty: 90 CAP | Refills: 3 | Status: SHIPPED | OUTPATIENT
Start: 2020-03-23 | End: 2021-02-19

## 2020-03-23 NOTE — TELEPHONE ENCOUNTER
She Davis   to Me            3/23/20 9:19 AM   Good morning, correct.  25mg daily  BPs have been 115-120 / 68-70, great!  Thanks, She  Take care during this virus...stay well

## 2020-03-23 NOTE — TELEPHONE ENCOUNTER
Prescription Question     Meghan Guzman, Med Ass't routed conversation to You 15 minutes ago (7:56 AM)      Angela Davis Richard P, M.D. 17 hours ago (3:05 PM)         Good morning,  I have cancelled my blood pressure check with Linda Locke on the 30th.  The Losartan is working well.  Would you please send a prescription for Losartan and Cartia Xt Er 24hr 120mg cap. (90 day supply) to my mail in [facility] pharmacy.  Thanks, She

## 2020-06-18 DIAGNOSIS — Z78.0 POSTMENOPAUSAL: ICD-10-CM

## 2020-06-18 RX ORDER — ESTRADIOL 1 MG/1
1 TABLET ORAL DAILY
Qty: 90 TAB | Refills: 4 | Status: SHIPPED | OUTPATIENT
Start: 2020-06-18 | End: 2021-03-29 | Stop reason: SDUPTHER

## 2020-06-26 DIAGNOSIS — E03.9 ACQUIRED HYPOTHYROIDISM: ICD-10-CM

## 2020-06-26 RX ORDER — LEVOTHYROXINE SODIUM 125 MCG
125 TABLET ORAL
Qty: 90 TAB | Refills: 0 | Status: SHIPPED | OUTPATIENT
Start: 2020-06-26 | End: 2020-12-09

## 2020-06-29 DIAGNOSIS — J30.1 SEASONAL ALLERGIC RHINITIS DUE TO POLLEN: ICD-10-CM

## 2020-06-29 RX ORDER — MONTELUKAST SODIUM 10 MG/1
10 TABLET ORAL DAILY
Qty: 90 TAB | Refills: 3 | Status: SHIPPED | OUTPATIENT
Start: 2020-06-29 | End: 2021-12-09

## 2020-09-30 ENCOUNTER — HOSPITAL ENCOUNTER (OUTPATIENT)
Facility: MEDICAL CENTER | Age: 75
End: 2020-09-30
Attending: INTERNAL MEDICINE
Payer: COMMERCIAL

## 2020-09-30 ENCOUNTER — NON-PROVIDER VISIT (OUTPATIENT)
Dept: INTERNAL MEDICINE | Facility: IMAGING CENTER | Age: 75
End: 2020-09-30
Payer: COMMERCIAL

## 2020-09-30 DIAGNOSIS — Z23 NEED FOR VACCINATION: ICD-10-CM

## 2020-09-30 LAB
FORWARD REASON: SPWHY: NORMAL
FORWARDED TO LAB: SPWHR: NORMAL
SPECIMEN SENT: SPWT1: NORMAL

## 2020-09-30 PROCEDURE — G0008 ADMIN INFLUENZA VIRUS VAC: HCPCS | Performed by: FAMILY MEDICINE

## 2020-09-30 PROCEDURE — 90662 IIV NO PRSV INCREASED AG IM: CPT | Performed by: FAMILY MEDICINE

## 2020-10-02 LAB
T3FREE SERPL-MCNC: 2.6 PG/ML (ref 2–4.4)
T4 FREE SERPL-MCNC: 1.63 NG/DL (ref 0.82–1.77)
TSH SERPL DL<=0.005 MIU/L-ACNC: 3.37 UIU/ML (ref 0.45–4.5)
VIT B12 SERPL-MCNC: 397 PG/ML (ref 232–1245)

## 2020-12-09 DIAGNOSIS — E03.9 ACQUIRED HYPOTHYROIDISM: ICD-10-CM

## 2020-12-09 RX ORDER — LEVOTHYROXINE SODIUM 125 MCG
TABLET ORAL
Qty: 90 TAB | Refills: 1 | Status: SHIPPED | OUTPATIENT
Start: 2020-12-09 | End: 2021-06-08 | Stop reason: SDUPTHER

## 2021-01-15 DIAGNOSIS — Z23 NEED FOR VACCINATION: ICD-10-CM

## 2021-02-19 DIAGNOSIS — I10 ESSENTIAL HYPERTENSION: ICD-10-CM

## 2021-02-19 DIAGNOSIS — Z00.00 ANNUAL PHYSICAL EXAM: ICD-10-CM

## 2021-02-19 RX ORDER — DILTIAZEM HYDROCHLORIDE 120 MG/1
120 CAPSULE, COATED, EXTENDED RELEASE ORAL DAILY
Qty: 90 CAPSULE | Refills: 0 | Status: SHIPPED | OUTPATIENT
Start: 2021-02-19 | End: 2021-05-14 | Stop reason: SDUPTHER

## 2021-02-19 RX ORDER — LOSARTAN POTASSIUM 25 MG/1
25 TABLET ORAL DAILY
Qty: 90 TABLET | Refills: 0 | Status: SHIPPED | OUTPATIENT
Start: 2021-02-19 | End: 2021-05-14 | Stop reason: SDUPTHER

## 2021-03-29 DIAGNOSIS — Z78.0 POSTMENOPAUSAL: ICD-10-CM

## 2021-03-29 RX ORDER — ESTRADIOL 1 MG/1
1 TABLET ORAL DAILY
Qty: 90 TABLET | Refills: 0 | Status: SHIPPED | OUTPATIENT
Start: 2021-03-29 | End: 2021-04-09 | Stop reason: SDUPTHER

## 2021-03-29 NOTE — TELEPHONE ENCOUNTER
"----- Message from Angela Davis sent at 3/28/2021 10:03 AM PDT -----  Regarding: Prescription Question  Contact: [phone]  Good morning,    I have just received a notice from my new Part D drug insurance, that my Estradiol Tab 1mg now requires  'pre-authorizon' before it can be refilled.  If it's all the same to you, rather than playing games with insurance, would you send a new 90 day prescription to [facility]  pharmacy ([address]  That way I can take advantage of their \"90 day / $10\" prescription plan.  Thank you, She"

## 2021-04-09 DIAGNOSIS — Z78.0 POSTMENOPAUSAL: ICD-10-CM

## 2021-04-09 RX ORDER — ESTRADIOL 1 MG/1
1 TABLET ORAL DAILY
Qty: 90 TABLET | Refills: 0 | Status: SHIPPED | OUTPATIENT
Start: 2021-04-09 | End: 2021-08-17

## 2021-04-21 DIAGNOSIS — D75.89 MACROCYTOSIS: ICD-10-CM

## 2021-04-21 DIAGNOSIS — E03.9 ACQUIRED HYPOTHYROIDISM: Chronic | ICD-10-CM

## 2021-04-21 DIAGNOSIS — E55.9 VITAMIN D DEFICIENCY: ICD-10-CM

## 2021-04-21 DIAGNOSIS — I10 ESSENTIAL HYPERTENSION: ICD-10-CM

## 2021-04-21 DIAGNOSIS — R73.9 HYPERGLYCEMIA: ICD-10-CM

## 2021-04-22 ENCOUNTER — HOSPITAL ENCOUNTER (OUTPATIENT)
Facility: MEDICAL CENTER | Age: 76
End: 2021-04-22
Attending: FAMILY MEDICINE
Payer: COMMERCIAL

## 2021-04-22 ENCOUNTER — HOSPITAL ENCOUNTER (OUTPATIENT)
Facility: MEDICAL CENTER | Age: 76
End: 2021-04-22
Attending: INTERNAL MEDICINE
Payer: COMMERCIAL

## 2021-04-22 ENCOUNTER — NON-PROVIDER VISIT (OUTPATIENT)
Dept: INTERNAL MEDICINE | Facility: IMAGING CENTER | Age: 76
End: 2021-04-22
Payer: COMMERCIAL

## 2021-04-22 DIAGNOSIS — R73.9 HYPERGLYCEMIA: ICD-10-CM

## 2021-04-22 DIAGNOSIS — I10 ESSENTIAL HYPERTENSION: ICD-10-CM

## 2021-04-22 DIAGNOSIS — Z00.00 ANNUAL PHYSICAL EXAM: ICD-10-CM

## 2021-04-22 DIAGNOSIS — E03.9 ACQUIRED HYPOTHYROIDISM: Chronic | ICD-10-CM

## 2021-04-22 DIAGNOSIS — D75.89 MACROCYTOSIS: ICD-10-CM

## 2021-04-22 DIAGNOSIS — E55.9 VITAMIN D DEFICIENCY: ICD-10-CM

## 2021-04-22 LAB
ALBUMIN SERPL BCP-MCNC: 4.5 G/DL (ref 3.2–4.9)
ALBUMIN SERPL BCP-MCNC: 4.6 G/DL (ref 3.2–4.9)
ALBUMIN/GLOB SERPL: 1.3 G/DL
ALBUMIN/GLOB SERPL: 1.3 G/DL
ALP SERPL-CCNC: 63 U/L (ref 30–99)
ALP SERPL-CCNC: 65 U/L (ref 30–99)
ALT SERPL-CCNC: 21 U/L (ref 2–50)
ALT SERPL-CCNC: 23 U/L (ref 2–50)
ANION GAP SERPL CALC-SCNC: 11 MMOL/L (ref 7–16)
ANION GAP SERPL CALC-SCNC: 9 MMOL/L (ref 7–16)
AST SERPL-CCNC: 28 U/L (ref 12–45)
AST SERPL-CCNC: 30 U/L (ref 12–45)
BASOPHILS # BLD AUTO: 1.7 % (ref 0–1.8)
BASOPHILS # BLD AUTO: 1.7 % (ref 0–1.8)
BASOPHILS # BLD: 0.07 K/UL (ref 0–0.12)
BASOPHILS # BLD: 0.07 K/UL (ref 0–0.12)
BILIRUB SERPL-MCNC: 0.5 MG/DL (ref 0.1–1.5)
BILIRUB SERPL-MCNC: 0.5 MG/DL (ref 0.1–1.5)
BUN SERPL-MCNC: 20 MG/DL (ref 8–22)
BUN SERPL-MCNC: 20 MG/DL (ref 8–22)
CALCIUM SERPL-MCNC: 9.7 MG/DL (ref 8.5–10.5)
CALCIUM SERPL-MCNC: 9.8 MG/DL (ref 8.5–10.5)
CHLORIDE SERPL-SCNC: 101 MMOL/L (ref 96–112)
CHLORIDE SERPL-SCNC: 102 MMOL/L (ref 96–112)
CHOLEST SERPL-MCNC: 220 MG/DL (ref 100–199)
CO2 SERPL-SCNC: 27 MMOL/L (ref 20–33)
CO2 SERPL-SCNC: 27 MMOL/L (ref 20–33)
CREAT SERPL-MCNC: 0.7 MG/DL (ref 0.5–1.4)
CREAT SERPL-MCNC: 0.71 MG/DL (ref 0.5–1.4)
EOSINOPHIL # BLD AUTO: 0.41 K/UL (ref 0–0.51)
EOSINOPHIL # BLD AUTO: 0.41 K/UL (ref 0–0.51)
EOSINOPHIL NFR BLD: 9.7 % (ref 0–6.9)
EOSINOPHIL NFR BLD: 9.7 % (ref 0–6.9)
ERYTHROCYTE [DISTWIDTH] IN BLOOD BY AUTOMATED COUNT: 42.8 FL (ref 35.9–50)
ERYTHROCYTE [DISTWIDTH] IN BLOOD BY AUTOMATED COUNT: 42.8 FL (ref 35.9–50)
EST. AVERAGE GLUCOSE BLD GHB EST-MCNC: 111 MG/DL
FOLATE SERPL-MCNC: 24 NG/ML
GLOBULIN SER CALC-MCNC: 3.5 G/DL (ref 1.9–3.5)
GLOBULIN SER CALC-MCNC: 3.5 G/DL (ref 1.9–3.5)
GLUCOSE SERPL-MCNC: 84 MG/DL (ref 65–99)
GLUCOSE SERPL-MCNC: 85 MG/DL (ref 65–99)
HBA1C MFR BLD: 5.5 % (ref 4–5.6)
HCT VFR BLD AUTO: 48.4 % (ref 37–47)
HCT VFR BLD AUTO: 48.4 % (ref 37–47)
HDLC SERPL-MCNC: 88 MG/DL
HGB BLD-MCNC: 16.5 G/DL (ref 12–16)
HGB BLD-MCNC: 16.5 G/DL (ref 12–16)
IMM GRANULOCYTES # BLD AUTO: 0.01 K/UL (ref 0–0.11)
IMM GRANULOCYTES # BLD AUTO: 0.01 K/UL (ref 0–0.11)
IMM GRANULOCYTES NFR BLD AUTO: 0.2 % (ref 0–0.9)
IMM GRANULOCYTES NFR BLD AUTO: 0.2 % (ref 0–0.9)
INR PPP: 0.96 (ref 0.87–1.13)
LDLC SERPL CALC-MCNC: 118 MG/DL
LYMPHOCYTES # BLD AUTO: 0.72 K/UL (ref 1–4.8)
LYMPHOCYTES # BLD AUTO: 0.72 K/UL (ref 1–4.8)
LYMPHOCYTES NFR BLD: 17.1 % (ref 22–41)
LYMPHOCYTES NFR BLD: 17.1 % (ref 22–41)
MCH RBC QN AUTO: 34.1 PG (ref 27–33)
MCH RBC QN AUTO: 34.1 PG (ref 27–33)
MCHC RBC AUTO-ENTMCNC: 34.1 G/DL (ref 33.6–35)
MCHC RBC AUTO-ENTMCNC: 34.1 G/DL (ref 33.6–35)
MCV RBC AUTO: 100 FL (ref 81.4–97.8)
MCV RBC AUTO: 100 FL (ref 81.4–97.8)
MONOCYTES # BLD AUTO: 0.51 K/UL (ref 0–0.85)
MONOCYTES # BLD AUTO: 0.51 K/UL (ref 0–0.85)
MONOCYTES NFR BLD AUTO: 12.1 % (ref 0–13.4)
MONOCYTES NFR BLD AUTO: 12.1 % (ref 0–13.4)
NEUTROPHILS # BLD AUTO: 2.5 K/UL (ref 2–7.15)
NEUTROPHILS # BLD AUTO: 2.5 K/UL (ref 2–7.15)
NEUTROPHILS NFR BLD: 59.2 % (ref 44–72)
NEUTROPHILS NFR BLD: 59.2 % (ref 44–72)
NRBC # BLD AUTO: 0 K/UL
NRBC # BLD AUTO: 0 K/UL
NRBC BLD-RTO: 0 /100 WBC
NRBC BLD-RTO: 0 /100 WBC
PLATELET # BLD AUTO: 195 K/UL (ref 164–446)
PLATELET # BLD AUTO: 195 K/UL (ref 164–446)
PMV BLD AUTO: 11.5 FL (ref 9–12.9)
PMV BLD AUTO: 11.5 FL (ref 9–12.9)
POTASSIUM SERPL-SCNC: 3.9 MMOL/L (ref 3.6–5.5)
POTASSIUM SERPL-SCNC: 4 MMOL/L (ref 3.6–5.5)
PROT SERPL-MCNC: 8 G/DL (ref 6–8.2)
PROT SERPL-MCNC: 8.1 G/DL (ref 6–8.2)
PROTHROMBIN TIME: 13 SEC (ref 12–14.6)
RBC # BLD AUTO: 4.84 M/UL (ref 4.2–5.4)
RBC # BLD AUTO: 4.84 M/UL (ref 4.2–5.4)
SODIUM SERPL-SCNC: 137 MMOL/L (ref 135–145)
SODIUM SERPL-SCNC: 140 MMOL/L (ref 135–145)
TRIGL SERPL-MCNC: 70 MG/DL (ref 0–149)
TSH SERPL DL<=0.005 MIU/L-ACNC: 2.43 UIU/ML (ref 0.38–5.33)
VIT B12 SERPL-MCNC: 500 PG/ML (ref 211–911)
WBC # BLD AUTO: 4.2 K/UL (ref 4.8–10.8)
WBC # BLD AUTO: 4.2 K/UL (ref 4.8–10.8)

## 2021-04-22 PROCEDURE — 82105 ALPHA-FETOPROTEIN SERUM: CPT

## 2021-04-22 PROCEDURE — 85025 COMPLETE CBC W/AUTO DIFF WBC: CPT | Mod: 91

## 2021-04-22 PROCEDURE — 83036 HEMOGLOBIN GLYCOSYLATED A1C: CPT

## 2021-04-22 PROCEDURE — 82306 VITAMIN D 25 HYDROXY: CPT

## 2021-04-22 PROCEDURE — 84443 ASSAY THYROID STIM HORMONE: CPT

## 2021-04-22 PROCEDURE — 80053 COMPREHEN METABOLIC PANEL: CPT | Mod: 91

## 2021-04-22 PROCEDURE — 82607 VITAMIN B-12: CPT

## 2021-04-22 PROCEDURE — 85610 PROTHROMBIN TIME: CPT

## 2021-04-22 PROCEDURE — 80053 COMPREHEN METABOLIC PANEL: CPT

## 2021-04-22 PROCEDURE — 80061 LIPID PANEL: CPT

## 2021-04-22 PROCEDURE — 85025 COMPLETE CBC W/AUTO DIFF WBC: CPT

## 2021-04-22 PROCEDURE — 82746 ASSAY OF FOLIC ACID SERUM: CPT

## 2021-04-23 LAB — AFP-TM SERPL-MCNC: 5 NG/ML (ref 0–9)

## 2021-04-24 LAB — 25(OH)D3 SERPL-MCNC: 48 NG/ML (ref 30–80)

## 2021-04-26 ENCOUNTER — TELEPHONE (OUTPATIENT)
Dept: CARDIOLOGY | Facility: MEDICAL CENTER | Age: 76
End: 2021-04-26

## 2021-04-26 NOTE — TELEPHONE ENCOUNTER
----- Message from Joseph Anderson M.D. sent at 4/26/2021  7:39 AM PDT -----  Lab has been reviewed.  Chemistry panel is entirely normal.  LDL is mildly elevated but improved from a year ago.  Triglycerides are also better.  Would not recommend any changes in diet or medications

## 2021-04-28 ENCOUNTER — OFFICE VISIT (OUTPATIENT)
Dept: INTERNAL MEDICINE | Facility: IMAGING CENTER | Age: 76
End: 2021-04-28
Payer: COMMERCIAL

## 2021-04-28 VITALS
TEMPERATURE: 98.6 F | RESPIRATION RATE: 17 BRPM | WEIGHT: 128 LBS | BODY MASS INDEX: 22.68 KG/M2 | OXYGEN SATURATION: 93 % | HEIGHT: 63 IN | SYSTOLIC BLOOD PRESSURE: 108 MMHG | HEART RATE: 83 BPM | DIASTOLIC BLOOD PRESSURE: 62 MMHG

## 2021-04-28 DIAGNOSIS — E03.9 ACQUIRED HYPOTHYROIDISM: Chronic | ICD-10-CM

## 2021-04-28 DIAGNOSIS — M15.9 GENERALIZED OA: ICD-10-CM

## 2021-04-28 DIAGNOSIS — Z00.00 ENCOUNTER FOR MEDICARE ANNUAL WELLNESS EXAM: ICD-10-CM

## 2021-04-28 DIAGNOSIS — I10 ESSENTIAL HYPERTENSION: ICD-10-CM

## 2021-04-28 DIAGNOSIS — M85.89 OSTEOPENIA OF MULTIPLE SITES: Chronic | ICD-10-CM

## 2021-04-28 DIAGNOSIS — Z12.11 COLON CANCER SCREENING: ICD-10-CM

## 2021-04-28 DIAGNOSIS — Z71.85 VACCINE COUNSELING: ICD-10-CM

## 2021-04-28 DIAGNOSIS — K75.4 AUTOIMMUNE HEPATITIS (HCC): Chronic | ICD-10-CM

## 2021-04-28 PROCEDURE — G0439 PPPS, SUBSEQ VISIT: HCPCS | Performed by: FAMILY MEDICINE

## 2021-04-28 RX ORDER — DICLOFENAC SODIUM 30 MG/G
4 GEL TOPICAL 2 TIMES DAILY
Qty: 800 G | Refills: 6 | Status: SHIPPED
Start: 2021-04-28 | End: 2021-08-11

## 2021-04-28 ASSESSMENT — FIBROSIS 4 INDEX: FIB4 SCORE: 2.37

## 2021-04-28 ASSESSMENT — ENCOUNTER SYMPTOMS: GENERAL WELL-BEING: GOOD

## 2021-04-28 ASSESSMENT — PATIENT HEALTH QUESTIONNAIRE - PHQ9: CLINICAL INTERPRETATION OF PHQ2 SCORE: 0

## 2021-04-28 ASSESSMENT — ACTIVITIES OF DAILY LIVING (ADL): BATHING_REQUIRES_ASSISTANCE: 0

## 2021-04-28 NOTE — PROGRESS NOTES
CC:   Medicare Annual Wellness Visit    HPI:  Angela is a 75 y.o. female here for her Medicare Annual Wellness Visit and to review labs done 4/22/2021. She reports that her health has been stable, and she has not left her house much at all during COVID pandemic period. She continues to struggle with chronic OA, mainly affecting b/l hips currently, and is interested in trying higher dose Diclofenac gel for pain relief. She has failed Salon Pas patches and does not tolerate oral medications well. She has chronic acquired hypothyroidism with daily Synthroid use, and chronic essential HTN/history of PSVT well controlled with daily Losartan and Dilt use. She continues to take Vitamin D3 and calcium for history of osteopenia, and she has history of autoimmune hepatitis controlled with Azathioprine managed by Dr. Gavin. She is aware of overdue screening mammogram (will defer to next January due to COVID concerns), and she will discuss need/timing for screening colonoscopy with her GI team. She is aware of Shingrix and COVID vaccine eligibility, endorses 100% medication compliance, and is in good spirits today.    Patient Active Problem List    Diagnosis Date Noted   • Generalized OA 04/12/2019   • Elevated hemoglobin A1c 09/26/2018   • Essential hypertension 02/12/2018   • Osteopenia of multiple sites 05/17/2017   • Acquired hypothyroidism 05/17/2017   • Autoimmune hepatitis (CMS-HCC) 10/10/2014   • Mitral regurgitation 10/10/2014   • Paroxysmal supraventricular tachycardia, details unknow - dx 3-5 y ago 04/19/2013     Current Outpatient Medications   Medication Sig Dispense Refill   • CALCIUM CARB-CHOLECALCIFEROL PO Take 1 Dose by mouth every day. Calcium 1000 mg/ Vitamin D 2000 IU daily     • diclofenac sodium 3 % Gel Apply 4 g topically 2 times a day. 800 g 6   • estradiol (ESTRACE) 1 MG Tab Take 1 tablet by mouth every day. 90 tablet 0   • DILTIAZem CD (CARTIA XT) 120 MG CAPSULE SR 24 HR Take 1 capsule by mouth  every day. 90 capsule 0   • losartan (COZAAR) 25 MG Tab Take 1 tablet by mouth every day. 90 tablet 0   • SYNTHROID 125 MCG Tab take one tablet by mouth every morning on an empty stomach 90 Tab 1   • montelukast (SINGULAIR) 10 MG Tab Take 1 Tab by mouth every day. 90 Tab 3   • Diclofenac Sodium (VOLTAREN) 1 % Gel 4 g by Apply externally route 2 Times a Day. 8 Tube 6   • Azathioprine 75 MG Tab Take 1 Tab by mouth every day. (Patient taking differently: Take 75 mg by mouth every day. 50 mg and 75 mg alternating dosages) 90 Tab 4   • aspirin EC (ECOTRIN) 81 MG Tablet Delayed Response Take 1 Tab by mouth every day. 90 Tab 4   • fexofenadine (ALLEGRA) 180 MG tablet Take 180 mg by mouth 1 time daily as needed. Indications: Hayfever     • docosahexanoic acid (OMEGA 3 FA) 1000 MG CAPS Take 1,000 mg by mouth every day.       No current facility-administered medications for this visit.      Current supplements: see MAR  Chronic narcotic pain medicines: no  Allergies: Statins [hmg-coa-r inhibitors], Chlordiazepoxide hcl, Codeine, Hydrocodone, Librium, Lopressor [kdc:ci pigment blue 63+metoprolol], and Sulphadimidine sodium [sulfamethazine sodium]  Exercise: no  Current social contact/activities: no due to COVID  Current mood: good  Advance Directive on file: yes    Screening:  Depression Screening    Little interest or pleasure in doing things?  0 - not at all  Feeling down, depressed , or hopeless? 0 - not at all  Patient Health Questionnaire Score: 0     Screening for Cognitive Impairment    Three Minute Recall (captain, garden, picture) 3/3    [place] clock face with all 12 numbers and set the hands to show 5 past 8.  Yes    Cognitive concerns identified deferred for follow up unless specifically addressed in assessment and plan.    Fall Risk Assessment    Has the patient had two or more falls in the last year or any fall with injury in the last year?  No    Safety Assessment    Throw rugs on floor.  No  Handrails on all  stairs.  Yes  Good lighting in all hallways.  Yes  Difficulty hearing.  No  Patient counseled about all safety risks that were identified.    Functional Assessment ADLs    Are there any barriers preventing you from cooking for yourself or meeting nutritional needs?  No.    Are there any barriers preventing you from driving safely or obtaining transportation?  No.    Are there any barriers preventing you from using a telephone or calling for help?  No.    Are there any barriers preventing you from shopping?  No.    Are there any barriers preventing you from taking care of your own finances?  No.    Are there any barriers preventing you from managing your medications?  No.    Are there any barriers preventing you from showering, bathing or dressing yourself?  No.    Are you currently engaging in any exercise or physical activity?  Yes.     What is your perception of your health?  Good.      Health Maintenance Summary                COVID-19 Vaccine Overdue 9/13/1962     IMM ZOSTER VACCINES Overdue 9/13/1996     COLONOSCOPY Overdue 2/17/2021      Done 2/17/2016 REFERRAL TO GI FOR COLONOSCOPY    MAMMOGRAM Postponed 1/13/2022 Originally 1/13/2021. Patient Refused     Done 1/13/2020 MA-SCREENING MAMMO BILAT W/TOMOSYNTHESIS W/CAD     Patient has more history with this topic...    Annual Wellness Visit Next Due 4/29/2022      Done 4/28/2021 Visit Dx: Encounter for Medicare annual wellness exam     Patient has more history with this topic...    BONE DENSITY Next Due 9/14/2022      Done 9/14/2017 DS-BONE DENSITY STUDY (DEXA)     Patient has more history with this topic...    IMM DTaP/Tdap/Td Vaccine Next Due 9/14/2027      Done 9/14/2017 Imm Admin: Tdap Vaccine          Patient Care Team:  Enedelia Bourne M.D. as PCP - General (Family Medicine)      Social History     Tobacco Use   • Smoking status: Never Smoker   • Smokeless tobacco: Never Used   Substance Use Topics   • Alcohol use: No   • Drug use: No     Family History  "  Problem Relation Age of Onset   • Heart Disease Mother    • Cancer Mother    • Heart Disease Father    • Cancer Father    • Heart Disease Sister    • Heart Attack Sister    • Heart Disease Brother    • Heart Attack Brother    • Heart Disease Brother    • Heart Attack Brother    • Diabetes Child    • Psychiatric Illness Child         hodgkins survivor     She  has a past medical history of Autoimmune hepatitis (HCC), B12 deficiency, Congenital malrotation of intestine, Fatigue (5/22/2013), Other chest pain (4/19/2013), Paroxysmal supraventricular tachycardia, details unknow - dx 3-5 y ago (4/19/2013), and Thyroid disease.   Past Surgical History:   Procedure Laterality Date   • LUMPECTOMY  1982    right breast   • ABDOMINAL HYSTERECTOMY TOTAL  1970   • HERNIA REPAIR  1970    umbilical   • TONSILLECTOMY  1954   • APPENDECTOMY  1950   • OTHER ABDOMINAL SURGERY  1965-66    congenital malrotation of intestines   • PRIMARY C SECTION  1968/1970       ROS:    All positives noted in HPI. All others reviewed and are negative.    Ostomy or other tubes or amputations: no  Chronic oxygen use: no  Last eye exam: pending due to pandemic  : denies urinary incontinence; does not interfere with ADLs/ sleep  Gait: stable  Problems with balance/ difficulty walking: limited by OA pain  Hearing: good  Dentition: adequate    Lab results 4/22/2021 reviewed with patient at visit today.     Exam:   /62 (BP Location: Left arm, Patient Position: Sitting, BP Cuff Size: Adult)   Pulse 83   Temp 37 °C (98.6 °F) (Temporal)   Resp 17   Ht 1.6 m (5' 3\")   Wt 58.1 kg (128 lb)   SpO2 93%  Body mass index is 22.67 kg/m².    Gen: Well developed; well nourished; no acute distress; age appropriate appearance   HEENT: Normocephalic; atraumatic; PEERLA b/l; sclera clear b/l; b/l external auditory canals WNL; b/l TM WNL; nares patent; oropharynx clear; mask in place  Neck: No adenopathy; no thyromegaly  CV: Regular rate and rhythm; S1/ S2 " present; no murmur, gallop or rub noted  Pulm: No respiratory distress; clear to ascultation b/l; no wheezing or stridor noted b/l  Abd: Adequate bowel sounds noted; soft and nontender; no rebound, rigidity, nor distention   Extremities: No peripheral edema b/l LE extremities/ no clubbing nor cyanosis noted  Skin: Warm and dry; no rashes noted   Neuro: No focal deficits noted; pt is able to get up out of chair unassisted and walk forward  Psych: AAOx4; mood and affect are appropriate    Assessment and Plan:  1. Autoimmune hepatitis (CMS-HCC)  Stable/ controlled with ongoing Azathioprine use managed by Dr. Gavin  - Subsequent Annual Wellness Visit - Includes PPPS ()    2. Generalized OA  Ongoing issue for patient. She is currently using Voltaren 1% gel (failed Salon Pas patches and cannot tolerate PO control medication for condition) and would like to try 3% gel. New RX sent to pharmacy.   - diclofenac sodium 3 % Gel; Apply 4 g topically 2 times a day.  Dispense: 800 g; Refill: 6  - Subsequent Annual Wellness Visit - Includes PPPS ()    3. Essential hypertension  Stable/ well controlled with current daily medication use.   - Subsequent Annual Wellness Visit - Includes PPPS ()    4. Acquired hypothyroidism  Stable/ patient can continue current Synthroid use.   - Subsequent Annual Wellness Visit - Includes PPPS ()    5. Osteopenia of multiple sites  Stable/ recommend patient continue current Vitamin D and calcium use and fall precautions. Next dexa scan will be due 9/22.   - Subsequent Annual Wellness Visit - Includes PPPS ()    6. Colon cancer screening  Per our records, patient is due for five year recall colonoscopy and will discuss topic with her GI team accordingly.   - Subsequent Annual Wellness Visit - Includes PPPS ()    7. Vaccine counseling  Patient is aware of COVID and Shingrix vaccination eligibility.   - Subsequent Annual Wellness Visit - Includes PPPS ()    8.  Encounter for Medicare annual wellness exam  Patient is stable and remains well informed about chronic medical conditions. She will contact our office as new needs arise.   - Subsequent Annual Wellness Visit - Includes PPPS ()       Services needed: no new services needed at this time  Health Care Screening: recommendations as per orders if indicated.  Referrals offered: none  Counseling provided today:  · Prevent falls and reduce trip hazards; Secure or remove rugs if present   · Maintain working fire alarm and carbon monoxide detectors   · Engage in regular physical activity daily and social activities weekly as tolerated

## 2021-05-14 ENCOUNTER — PATIENT MESSAGE (OUTPATIENT)
Dept: CARDIOLOGY | Facility: MEDICAL CENTER | Age: 76
End: 2021-05-14

## 2021-05-14 DIAGNOSIS — I10 ESSENTIAL HYPERTENSION: ICD-10-CM

## 2021-05-14 RX ORDER — DILTIAZEM HYDROCHLORIDE 120 MG/1
120 CAPSULE, COATED, EXTENDED RELEASE ORAL DAILY
Qty: 90 CAPSULE | Refills: 0 | Status: SHIPPED | OUTPATIENT
Start: 2021-05-14 | End: 2021-07-29 | Stop reason: SDUPTHER

## 2021-05-14 RX ORDER — LOSARTAN POTASSIUM 25 MG/1
25 TABLET ORAL DAILY
Qty: 90 TABLET | Refills: 0 | Status: SHIPPED | OUTPATIENT
Start: 2021-05-14 | End: 2021-07-29 | Stop reason: SDUPTHER

## 2021-06-08 DIAGNOSIS — E03.9 ACQUIRED HYPOTHYROIDISM: ICD-10-CM

## 2021-06-08 RX ORDER — LEVOTHYROXINE SODIUM 125 MCG
125 TABLET ORAL
Qty: 90 TABLET | Refills: 2 | Status: SHIPPED | OUTPATIENT
Start: 2021-06-08 | End: 2022-02-16

## 2021-06-16 DIAGNOSIS — M15.9 GENERALIZED OA: ICD-10-CM

## 2021-07-29 DIAGNOSIS — I10 ESSENTIAL HYPERTENSION: ICD-10-CM

## 2021-07-29 RX ORDER — DILTIAZEM HYDROCHLORIDE 120 MG/1
120 CAPSULE, COATED, EXTENDED RELEASE ORAL DAILY
Qty: 90 CAPSULE | Refills: 3 | Status: SHIPPED | OUTPATIENT
Start: 2021-07-29 | End: 2022-03-24 | Stop reason: SDUPTHER

## 2021-07-29 RX ORDER — LOSARTAN POTASSIUM 25 MG/1
25 TABLET ORAL DAILY
Qty: 90 TABLET | Refills: 3 | Status: SHIPPED | OUTPATIENT
Start: 2021-07-29 | End: 2022-03-24 | Stop reason: SDUPTHER

## 2021-08-11 ENCOUNTER — OFFICE VISIT (OUTPATIENT)
Dept: INTERNAL MEDICINE | Facility: IMAGING CENTER | Age: 76
End: 2021-08-11
Payer: COMMERCIAL

## 2021-08-11 VITALS
BODY MASS INDEX: 22.7 KG/M2 | SYSTOLIC BLOOD PRESSURE: 118 MMHG | HEIGHT: 63 IN | OXYGEN SATURATION: 92 % | TEMPERATURE: 98.1 F | WEIGHT: 128.09 LBS | DIASTOLIC BLOOD PRESSURE: 58 MMHG | HEART RATE: 68 BPM | RESPIRATION RATE: 17 BRPM

## 2021-08-11 DIAGNOSIS — Z71.85 VACCINE COUNSELING: ICD-10-CM

## 2021-08-11 DIAGNOSIS — Z00.00 HEALTH CARE MAINTENANCE: ICD-10-CM

## 2021-08-11 PROCEDURE — 99212 OFFICE O/P EST SF 10 MIN: CPT | Performed by: FAMILY MEDICINE

## 2021-08-11 ASSESSMENT — FIBROSIS 4 INDEX: FIB4 SCORE: 2.37

## 2021-08-11 NOTE — PROGRESS NOTES
"Chief Complaint   Patient presents with   • Paperwork     DMV Form       HPI:  Patient is a 75 y.o. female established patient who presents today to have her DMV health form completed and to ask COVID vaccine questions. She reports that her overall health is unchanged, and she continues to struggle with generalize OA/ achy joints at times. She has history of autoimmune hepatitis with ongoing Azothioprine use managed by Dr. Gavin. Patient has declined both Zostavax and COVID vaccines to date due to her concerns about vaccine safety and potential side effect profile for her. She continues to self isolate at home majority of time to protect herself against COVID-19 exposure. She has declined screening mammogram and colonoscopy due during the pandemic and will update when infection rates decline. She endorses 100% medication compliance and is in good spirits today.    Patient Active Problem List    Diagnosis Date Noted   • Generalized OA 04/12/2019   • Elevated hemoglobin A1c 09/26/2018   • Essential hypertension 02/12/2018   • Osteopenia of multiple sites 05/17/2017   • Acquired hypothyroidism 05/17/2017   • Autoimmune hepatitis (CMS-HCC) 10/10/2014   • Mitral regurgitation 10/10/2014   • Paroxysmal supraventricular tachycardia, details unknow - dx 3-5 y ago 04/19/2013       Past medical, surgical, family, and social history was reviewed and updated in Epic chart by me today.     Medications and allergies reviewed and updated in Epic chart by me today.     ROS:  Pertinent positives listed above in HPI. All other systems have been reviewed and are negative.    PE:   /58 (BP Location: Left arm, Patient Position: Sitting, BP Cuff Size: Adult)   Pulse 68   Temp 36.7 °C (98.1 °F) (Temporal)   Resp 17   Ht 1.6 m (5' 3\")   Wt 58.1 kg (128 lb 1.4 oz)   SpO2 92%   BMI 22.69 kg/m²   Vital signs reviewed with patient.     Gen: Well developed; well nourished; no acute distress; non toxic appearance   CV: Regular rate " and rhythm; S1/ S2 present; no murmur, gallop or rub noted  Pulm: No respiratory distress; clear to ascultation b/l; no wheezing or stridor noted b/l  Extremities: No peripheral edema b/l LE extremities/ no clubbing nor cyanosis noted  Skin: Warm and dry; no rashes noted   Neuro: No focal deficits noted   Psych: AAOx4; mood and affect are at baseline    A/P:  1. Health care maintenance  Patient is stable and remains well informed about her chronic medical conditions. DMV health form completed at visit today.     2. Vaccine counseling  Refer to HPI for details. We discussed pros and cons of COVID vaccine, and patient is seeking statistical evidence of vaccine use in patients with similar autoimmune conditions as hers. I recommend patient follow up with Dr. Gavin to inquire if he has more specific hepatology related vaccine data.

## 2021-08-17 DIAGNOSIS — Z78.0 POSTMENOPAUSAL: ICD-10-CM

## 2021-08-17 RX ORDER — ESTRADIOL 1 MG/1
TABLET ORAL
Qty: 90 TABLET | Refills: 1 | Status: SHIPPED | OUTPATIENT
Start: 2021-08-17 | End: 2022-02-15

## 2021-11-02 ENCOUNTER — HOSPITAL ENCOUNTER (OUTPATIENT)
Facility: MEDICAL CENTER | Age: 76
End: 2021-11-02
Attending: INTERNAL MEDICINE
Payer: COMMERCIAL

## 2021-11-02 ENCOUNTER — NON-PROVIDER VISIT (OUTPATIENT)
Dept: INTERNAL MEDICINE | Facility: IMAGING CENTER | Age: 76
End: 2021-11-02
Payer: COMMERCIAL

## 2021-11-02 DIAGNOSIS — Z23 NEED FOR VACCINATION: ICD-10-CM

## 2021-11-02 LAB
ALBUMIN SERPL BCP-MCNC: 4.3 G/DL (ref 3.2–4.9)
ALBUMIN/GLOB SERPL: 1.4 G/DL
ALP SERPL-CCNC: 57 U/L (ref 30–99)
ALT SERPL-CCNC: 19 U/L (ref 2–50)
ANION GAP SERPL CALC-SCNC: 9 MMOL/L (ref 7–16)
AST SERPL-CCNC: 22 U/L (ref 12–45)
BASOPHILS # BLD AUTO: 0.8 % (ref 0–1.8)
BASOPHILS # BLD: 0.04 K/UL (ref 0–0.12)
BILIRUB SERPL-MCNC: 0.4 MG/DL (ref 0.1–1.5)
BUN SERPL-MCNC: 16 MG/DL (ref 8–22)
CALCIUM SERPL-MCNC: 9.6 MG/DL (ref 8.5–10.5)
CHLORIDE SERPL-SCNC: 106 MMOL/L (ref 96–112)
CO2 SERPL-SCNC: 26 MMOL/L (ref 20–33)
CREAT SERPL-MCNC: 0.85 MG/DL (ref 0.5–1.4)
EOSINOPHIL # BLD AUTO: 0.3 K/UL (ref 0–0.51)
EOSINOPHIL NFR BLD: 6.1 % (ref 0–6.9)
ERYTHROCYTE [DISTWIDTH] IN BLOOD BY AUTOMATED COUNT: 43.9 FL (ref 35.9–50)
GLOBULIN SER CALC-MCNC: 3.1 G/DL (ref 1.9–3.5)
GLUCOSE SERPL-MCNC: 80 MG/DL (ref 65–99)
HCT VFR BLD AUTO: 46.6 % (ref 37–47)
HGB BLD-MCNC: 15.5 G/DL (ref 12–16)
IMM GRANULOCYTES # BLD AUTO: 0.02 K/UL (ref 0–0.11)
IMM GRANULOCYTES NFR BLD AUTO: 0.4 % (ref 0–0.9)
INR PPP: 1.02 (ref 0.87–1.13)
LYMPHOCYTES # BLD AUTO: 0.73 K/UL (ref 1–4.8)
LYMPHOCYTES NFR BLD: 14.7 % (ref 22–41)
MCH RBC QN AUTO: 33.4 PG (ref 27–33)
MCHC RBC AUTO-ENTMCNC: 33.3 G/DL (ref 33.6–35)
MCV RBC AUTO: 100.4 FL (ref 81.4–97.8)
MONOCYTES # BLD AUTO: 0.59 K/UL (ref 0–0.85)
MONOCYTES NFR BLD AUTO: 11.9 % (ref 0–13.4)
NEUTROPHILS # BLD AUTO: 3.27 K/UL (ref 2–7.15)
NEUTROPHILS NFR BLD: 66.1 % (ref 44–72)
NRBC # BLD AUTO: 0 K/UL
NRBC BLD-RTO: 0 /100 WBC
PLATELET # BLD AUTO: 190 K/UL (ref 164–446)
PMV BLD AUTO: 11.2 FL (ref 9–12.9)
POTASSIUM SERPL-SCNC: 3.9 MMOL/L (ref 3.6–5.5)
PROT SERPL-MCNC: 7.4 G/DL (ref 6–8.2)
PROTHROMBIN TIME: 13.1 SEC (ref 12–14.6)
RBC # BLD AUTO: 4.64 M/UL (ref 4.2–5.4)
SODIUM SERPL-SCNC: 141 MMOL/L (ref 135–145)
WBC # BLD AUTO: 5 K/UL (ref 4.8–10.8)

## 2021-11-02 PROCEDURE — 80053 COMPREHEN METABOLIC PANEL: CPT

## 2021-11-02 PROCEDURE — 99999 PR NO CHARGE: CPT | Performed by: FAMILY MEDICINE

## 2021-11-02 PROCEDURE — 85025 COMPLETE CBC W/AUTO DIFF WBC: CPT

## 2021-11-02 PROCEDURE — 90662 IIV NO PRSV INCREASED AG IM: CPT | Performed by: FAMILY MEDICINE

## 2021-11-02 PROCEDURE — 82105 ALPHA-FETOPROTEIN SERUM: CPT

## 2021-11-02 PROCEDURE — G0008 ADMIN INFLUENZA VIRUS VAC: HCPCS | Performed by: FAMILY MEDICINE

## 2021-11-02 PROCEDURE — 85610 PROTHROMBIN TIME: CPT

## 2021-11-04 LAB — AFP-TM SERPL-MCNC: 4 NG/ML (ref 0–9)

## 2022-01-17 ENCOUNTER — OFFICE VISIT (OUTPATIENT)
Dept: CARDIOLOGY | Facility: MEDICAL CENTER | Age: 77
End: 2022-01-17
Payer: COMMERCIAL

## 2022-01-17 VITALS
HEART RATE: 78 BPM | BODY MASS INDEX: 23 KG/M2 | RESPIRATION RATE: 14 BRPM | WEIGHT: 129.8 LBS | SYSTOLIC BLOOD PRESSURE: 102 MMHG | HEIGHT: 63 IN | DIASTOLIC BLOOD PRESSURE: 58 MMHG | OXYGEN SATURATION: 93 %

## 2022-01-17 DIAGNOSIS — I10 ESSENTIAL HYPERTENSION: ICD-10-CM

## 2022-01-17 DIAGNOSIS — I47.10 PAROXYSMAL SUPRAVENTRICULAR TACHYCARDIA (HCC): ICD-10-CM

## 2022-01-17 PROCEDURE — 93000 ELECTROCARDIOGRAM COMPLETE: CPT | Performed by: INTERNAL MEDICINE

## 2022-01-17 PROCEDURE — 99203 OFFICE O/P NEW LOW 30 MIN: CPT | Performed by: INTERNAL MEDICINE

## 2022-01-17 RX ORDER — AZATHIOPRINE 50 MG/1
TABLET ORAL
COMMUNITY
Start: 2022-01-06 | End: 2022-01-17

## 2022-01-17 ASSESSMENT — FIBROSIS 4 INDEX: FIB4 SCORE: 1.99

## 2022-01-17 NOTE — PROGRESS NOTES
Chief Complaint   Patient presents with   • Tachycardia     F/V Dx: Paroxysmal supraventricular tachycardia, details unknow - dx 3-5 y ago   • Hypertension     F/X Dx: Essential hypertension       Subjective     She Davis is a 76 y.o. female who presents today for initial visit after [facility] of her usual cardiologist Dr. ATKINSON.  She has a history of PSVT well-controlled on CCB for many years.  She notes CCS class I angina also unchanged and only with significant exertion.  She has not had an ischemic evaluation for this recently.  She has some mild mitral insufficiency as well which has been stable.    Past Medical History:   Diagnosis Date   • Autoimmune hepatitis (HCC)    • B12 deficiency    • Congenital malrotation of intestine    • Fatigue 5/22/2013   • Other chest pain 4/19/2013   • Paroxysmal supraventricular tachycardia, details unknow - dx 3-5 y ago 4/19/2013   • Thyroid disease      Past Surgical History:   Procedure Laterality Date   • LUMPECTOMY  1982    right breast   • ABDOMINAL HYSTERECTOMY TOTAL  1970   • HERNIA REPAIR  1970    umbilical   • TONSILLECTOMY  1954   • APPENDECTOMY  1950   • OTHER ABDOMINAL SURGERY  1965-66    congenital malrotation of intestines   • PRIMARY C SECTION  1968/1970     Family History   Problem Relation Age of Onset   • Heart Disease Mother    • Cancer Mother    • Heart Disease Father    • Cancer Father    • Heart Disease Sister    • Heart Attack Sister    • Heart Disease Brother    • Heart Attack Brother    • Heart Disease Brother    • Heart Attack Brother    • Diabetes Child    • Psychiatric Illness Child         hodgkins survivor     Social History     Socioeconomic History   • Marital status:      Spouse name: Not on file   • Number of children: Not on file   • Years of education: Not on file   • Highest education level: Not on file   Occupational History   • Not on file   Tobacco Use   • Smoking status: Never Smoker   • Smokeless tobacco: Never Used    Substance and Sexual Activity   • Alcohol use: No   • Drug use: No   • Sexual activity: Not Currently     Partners: Male   Other Topics Concern   • Not on file   Social History Narrative   • Not on file     Social Determinants of Health     Financial Resource Strain:    • Difficulty of Paying Living Expenses: Not on file   Food Insecurity:    • Worried About Running Out of Food in the Last Year: Not on file   • Ran Out of Food in the Last Year: Not on file   Transportation Needs:    • Lack of Transportation (Medical): Not on file   • Lack of Transportation (Non-Medical): Not on file   Physical Activity:    • Days of Exercise per Week: Not on file   • Minutes of Exercise per Session: Not on file   Stress:    • Feeling of Stress : Not on file   Social Connections:    • Frequency of Communication with Friends and Family: Not on file   • Frequency of Social Gatherings with Friends and Family: Not on file   • Attends Nondenominational Services: Not on file   • Active Member of Clubs or Organizations: Not on file   • Attends Club or Organization Meetings: Not on file   • Marital Status: Not on file   Intimate Partner Violence:    • Fear of Current or Ex-Partner: Not on file   • Emotionally Abused: Not on file   • Physically Abused: Not on file   • Sexually Abused: Not on file   Housing Stability:    • Unable to Pay for Housing in the Last Year: Not on file   • Number of Places Lived in the Last Year: Not on file   • Unstable Housing in the Last Year: Not on file     Allergies   Allergen Reactions   • Statins [Hmg-Coa-R Inhibitors]      Autoimmune hepatitis   • Chlordiazepoxide Hcl      Causes hyperactivity   • Codeine    • Hydrocodone    • Librium    • Lopressor [Kdc:Ci Pigment Blue 63+Metoprolol]      Does not tolerate beta blockers   • Sulphadimidine Sodium [Sulfamethazine Sodium]      Outpatient Encounter Medications as of 1/17/2022   Medication Sig Dispense Refill   • Cholecalciferol (VITAMIN D3 PO) Take 2,000 mg by mouth.   "   • montelukast (SINGULAIR) 10 MG Tab TAKE ONE TABLET BY MOUTH ONE TIME DAILY 90 Tablet 3   • estradiol (ESTRACE) 1 MG Tab Take 1 tablet by mouth once daily 90 Tablet 1   • losartan (COZAAR) 25 MG Tab Take 1 tablet by mouth every day. 90 tablet 3   • DILTIAZem CD (CARTIA XT) 120 MG CAPSULE SR 24 HR Take 1 capsule by mouth every day. 90 capsule 3   • diclofenac sodium 1 % Gel Apply 4gram to affected area twice daily. 800 g 2   • SYNTHROID 125 MCG Tab Take 1 tablet by mouth every morning on an empty stomach. 90 tablet 2   • CALCIUM CARB-CHOLECALCIFEROL PO Take 1 Dose by mouth every day. Calcium 1000 mg/ Vitamin D 2000 IU daily     • Azathioprine 75 MG Tab Take 1 Tab by mouth every day. (Patient taking differently: Take 75 mg by mouth every day. 50 mg and 75 mg alternating dosages) 90 Tab 4   • aspirin EC (ECOTRIN) 81 MG Tablet Delayed Response Take 1 Tab by mouth every day. 90 Tab 4   • fexofenadine (ALLEGRA) 180 MG tablet Take 180 mg by mouth 1 time daily as needed. Indications: Hayfever     • docosahexanoic acid (OMEGA 3 FA) 1000 MG CAPS Take 1,000 mg by mouth every day.     • [DISCONTINUED] azaTHIOprine (IMURAN) 50 MG Tab        No facility-administered encounter medications on file as of 1/17/2022.     Review of Systems   All other systems reviewed and are negative.             Objective     /58 (BP Location: Left arm, Patient Position: Sitting, BP Cuff Size: Adult)   Pulse 78   Resp 14   Ht 1.6 m (5' 3\")   Wt 58.9 kg (129 lb 12.8 oz)   SpO2 93%   BMI 22.99 kg/m²     Physical Exam  Vitals reviewed.   Constitutional:       General: She is not in acute distress.     Appearance: She is well-developed. She is not diaphoretic.   HENT:      Head: Normocephalic and atraumatic.      Right Ear: External ear normal.      Left Ear: External ear normal.      Mouth/Throat:      Pharynx: No oropharyngeal exudate.   Eyes:      General: No scleral icterus.        Right eye: No discharge.         Left eye: No " discharge.      Conjunctiva/sclera: Conjunctivae normal.      Pupils: Pupils are equal, round, and reactive to light.   Neck:      Thyroid: No thyromegaly.      Vascular: No JVD.      Trachea: No tracheal deviation.   Cardiovascular:      Rate and Rhythm: Normal rate and regular rhythm.      Chest Wall: PMI is not displaced.      Pulses:           Carotid pulses are 2+ on the right side and 2+ on the left side.       Radial pulses are 2+ on the right side and 2+ on the left side.        Popliteal pulses are 2+ on the right side and 2+ on the left side.        Dorsalis pedis pulses are 2+ on the right side and 2+ on the left side.        Posterior tibial pulses are 2+ on the right side and 2+ on the left side.      Heart sounds: Normal heart sounds, S1 normal and S2 normal. No murmur heard.  No friction rub. No gallop. No S3 or S4 sounds.    Pulmonary:      Effort: Pulmonary effort is normal. No respiratory distress.      Breath sounds: Normal breath sounds. No wheezing or rales.   Chest:      Chest wall: No tenderness.   Abdominal:      General: Bowel sounds are normal. There is no distension.      Palpations: Abdomen is soft.      Tenderness: There is no abdominal tenderness.   Musculoskeletal:         General: No tenderness. Normal range of motion.      Cervical back: Normal range of motion and neck supple.   Skin:     General: Skin is warm and dry.      Findings: No erythema or rash.   Neurological:      Mental Status: She is alert and oriented to person, place, and time.      Cranial Nerves: No cranial nerve deficit (Cranial nerves II through XII grossly intact).   Psychiatric:         Behavior: Behavior normal.         Thought Content: Thought content normal.         Judgment: Judgment normal.       LABS:  Lab Results   Component Value Date/Time    CHOLSTRLTOT 220 (H) 04/22/2021 09:30 AM     (H) 04/22/2021 09:30 AM    HDL 88 04/22/2021 09:30 AM    TRIGLYCERIDE 70 04/22/2021 09:30 AM       Lab Results    Component Value Date/Time    WBC 5.0 11/02/2021 10:45 AM    RBC 4.64 11/02/2021 10:45 AM    HEMOGLOBIN 15.5 11/02/2021 10:45 AM    HEMATOCRIT 46.6 11/02/2021 10:45 AM    .4 (H) 11/02/2021 10:45 AM    NEUTSPOLYS 66.10 11/02/2021 10:45 AM    LYMPHOCYTES 14.70 (L) 11/02/2021 10:45 AM    MONOCYTES 11.90 11/02/2021 10:45 AM    EOSINOPHILS 6.10 11/02/2021 10:45 AM    BASOPHILS 0.80 11/02/2021 10:45 AM     Lab Results   Component Value Date/Time    SODIUM 141 11/02/2021 10:45 AM    POTASSIUM 3.9 11/02/2021 10:45 AM    CHLORIDE 106 11/02/2021 10:45 AM    CO2 26 11/02/2021 10:45 AM    GLUCOSE 80 11/02/2021 10:45 AM    BUN 16 11/02/2021 10:45 AM    CREATININE 0.85 11/02/2021 10:45 AM     Lab Results   Component Value Date    HBA1C 5.5 04/22/2021      Lab Results   Component Value Date/Time    ALKPHOSPHAT 57 11/02/2021 10:45 AM    ASTSGOT 22 11/02/2021 10:45 AM    ALTSGPT 19 11/02/2021 10:45 AM    TBILIRUBIN 0.4 11/02/2021 10:45 AM      No results found for: BNPBTYPENAT   Lab Results   Component Value Date/Time    TSH 3.370 09/29/2020 09:00 PM     Lab Results   Component Value Date/Time    PROTHROMBTM 13.1 11/02/2021 10:45 AM    INR 1.02 11/02/2021 10:45 AM                 Assessment & Plan     1. PSVT  EKG   2. Essential hypertension  EKG       Medical Decision Making: Today's Assessment/Status/Plan:          Doing well.  Stable angina.  Suggested stress testing.  She defers.  Suggested repeat echocardiogram.  She defers.  She would like to avoid medical testing due to the pandemic if possible.  We discussed symptoms of concern and that progression of her angina should prompt reconsideration and further evaluation and management.  In the meantime she would like to keep her medications as current.  Consider statin therapy.  Follow-up yearly.

## 2022-01-18 LAB — EKG IMPRESSION: NORMAL

## 2022-02-15 DIAGNOSIS — Z78.0 POSTMENOPAUSAL: ICD-10-CM

## 2022-02-15 RX ORDER — ESTRADIOL 1 MG/1
TABLET ORAL
Qty: 90 TABLET | Refills: 0 | Status: SHIPPED | OUTPATIENT
Start: 2022-02-15 | End: 2022-05-11 | Stop reason: SDUPTHER

## 2022-03-24 DIAGNOSIS — I10 ESSENTIAL HYPERTENSION: ICD-10-CM

## 2022-03-24 RX ORDER — DILTIAZEM HYDROCHLORIDE 120 MG/1
120 CAPSULE, COATED, EXTENDED RELEASE ORAL DAILY
Qty: 90 CAPSULE | Refills: 3 | Status: SHIPPED | OUTPATIENT
Start: 2022-03-24 | End: 2023-02-08 | Stop reason: SDUPTHER

## 2022-03-24 RX ORDER — LOSARTAN POTASSIUM 25 MG/1
25 TABLET ORAL DAILY
Qty: 90 TABLET | Refills: 3 | Status: SHIPPED | OUTPATIENT
Start: 2022-03-24 | End: 2023-02-08 | Stop reason: SDUPTHER

## 2022-04-20 DIAGNOSIS — E55.9 VITAMIN D DEFICIENCY: ICD-10-CM

## 2022-04-20 DIAGNOSIS — I10 ESSENTIAL HYPERTENSION: ICD-10-CM

## 2022-04-20 DIAGNOSIS — R73.9 HYPERGLYCEMIA: ICD-10-CM

## 2022-04-20 DIAGNOSIS — D75.89 MACROCYTOSIS: ICD-10-CM

## 2022-04-20 DIAGNOSIS — E78.2 MIXED DYSLIPIDEMIA: ICD-10-CM

## 2022-04-20 DIAGNOSIS — E03.9 ACQUIRED HYPOTHYROIDISM: ICD-10-CM

## 2022-04-26 ENCOUNTER — HOSPITAL ENCOUNTER (OUTPATIENT)
Facility: MEDICAL CENTER | Age: 77
End: 2022-04-26
Attending: INTERNAL MEDICINE
Payer: COMMERCIAL

## 2022-04-26 ENCOUNTER — HOSPITAL ENCOUNTER (OUTPATIENT)
Facility: MEDICAL CENTER | Age: 77
End: 2022-04-26
Attending: FAMILY MEDICINE
Payer: COMMERCIAL

## 2022-04-26 ENCOUNTER — NON-PROVIDER VISIT (OUTPATIENT)
Dept: INTERNAL MEDICINE | Facility: IMAGING CENTER | Age: 77
End: 2022-04-26
Payer: COMMERCIAL

## 2022-04-26 DIAGNOSIS — R73.9 HYPERGLYCEMIA: ICD-10-CM

## 2022-04-26 DIAGNOSIS — D75.89 MACROCYTOSIS: ICD-10-CM

## 2022-04-26 DIAGNOSIS — E78.2 MIXED DYSLIPIDEMIA: ICD-10-CM

## 2022-04-26 DIAGNOSIS — I10 ESSENTIAL HYPERTENSION: ICD-10-CM

## 2022-04-26 DIAGNOSIS — E55.9 VITAMIN D DEFICIENCY: ICD-10-CM

## 2022-04-26 DIAGNOSIS — E03.9 ACQUIRED HYPOTHYROIDISM: ICD-10-CM

## 2022-04-26 LAB
FORWARD REASON: SPWHY: NORMAL
FORWARDED TO LAB: SPWHR: NORMAL
INR PPP: 0.99 (ref 0.87–1.13)
PROTHROMBIN TIME: 12.8 SEC (ref 12–14.6)
SPECIMEN SENT: SPWT1: NORMAL

## 2022-04-26 PROCEDURE — 85610 PROTHROMBIN TIME: CPT

## 2022-04-29 LAB
25(OH)D3+25(OH)D2 SERPL-MCNC: 53.8 NG/ML (ref 30–100)
AFP-TM SERPL-MCNC: 3.4 NG/ML (ref 0–9.2)
ALBUMIN SERPL-MCNC: 4.2 G/DL (ref 3.7–4.7)
ALBUMIN/GLOB SERPL: 1.3 {RATIO} (ref 1.2–2.2)
ALP SERPL-CCNC: 55 IU/L (ref 44–121)
ALT SERPL-CCNC: 16 IU/L (ref 0–32)
AMBIG ABBREV LP  977212: NORMAL
AST SERPL-CCNC: 24 IU/L (ref 0–40)
BASOPHILS # BLD AUTO: 0.1 X10E3/UL (ref 0–0.2)
BASOPHILS NFR BLD AUTO: 1 %
BILIRUB SERPL-MCNC: 0.6 MG/DL (ref 0–1.2)
BUN SERPL-MCNC: 19 MG/DL (ref 8–27)
BUN/CREAT SERPL: 24 (ref 12–28)
CALCIUM SERPL-MCNC: 9.7 MG/DL (ref 8.7–10.3)
CHLORIDE SERPL-SCNC: 100 MMOL/L (ref 96–106)
CHOLEST SERPL-MCNC: 222 MG/DL (ref 100–199)
CO2 SERPL-SCNC: 24 MMOL/L (ref 20–29)
CREAT SERPL-MCNC: 0.8 MG/DL (ref 0.57–1)
EGFRCR SERPLBLD CKD-EPI 2021: 77 ML/MIN/1.73
EOSINOPHIL # BLD AUTO: 0.2 X10E3/UL (ref 0–0.4)
EOSINOPHIL NFR BLD AUTO: 4 %
ERYTHROCYTE [DISTWIDTH] IN BLOOD BY AUTOMATED COUNT: 12.1 % (ref 11.7–15.4)
FOLATE SERPL-MCNC: >20 NG/ML
GLOBULIN SER CALC-MCNC: 3.2 G/DL (ref 1.5–4.5)
GLUCOSE SERPL-MCNC: 81 MG/DL (ref 65–99)
HBA1C MFR BLD: 5.5 % (ref 4.8–5.6)
HCT VFR BLD AUTO: 45.4 % (ref 34–46.6)
HDLC SERPL-MCNC: 83 MG/DL
HGB BLD-MCNC: 15.4 G/DL (ref 11.1–15.9)
IMM GRANULOCYTES # BLD AUTO: 0 X10E3/UL (ref 0–0.1)
IMM GRANULOCYTES NFR BLD AUTO: 0 %
IMMATURE CELLS  115398: ABNORMAL
LABORATORY COMMENT REPORT: ABNORMAL
LDLC SERPL CALC-MCNC: 124 MG/DL (ref 0–99)
LYMPHOCYTES # BLD AUTO: 0.7 X10E3/UL (ref 0.7–3.1)
LYMPHOCYTES NFR BLD AUTO: 15 %
MCH RBC QN AUTO: 33.8 PG (ref 26.6–33)
MCHC RBC AUTO-ENTMCNC: 33.9 G/DL (ref 31.5–35.7)
MCV RBC AUTO: 100 FL (ref 79–97)
MONOCYTES # BLD AUTO: 0.7 X10E3/UL (ref 0.1–0.9)
MONOCYTES NFR BLD AUTO: 14 %
MORPHOLOGY BLD-IMP: ABNORMAL
NEUTROPHILS # BLD AUTO: 3.3 X10E3/UL (ref 1.4–7)
NEUTROPHILS NFR BLD AUTO: 66 %
NRBC BLD AUTO-RTO: ABNORMAL %
PLATELET # BLD AUTO: 176 X10E3/UL (ref 150–450)
POTASSIUM SERPL-SCNC: 3.9 MMOL/L (ref 3.5–5.2)
PROT SERPL-MCNC: 7.4 G/DL (ref 6–8.5)
RBC # BLD AUTO: 4.55 X10E6/UL (ref 3.77–5.28)
SODIUM SERPL-SCNC: 139 MMOL/L (ref 134–144)
TRIGL SERPL-MCNC: 85 MG/DL (ref 0–149)
TSH SERPL DL<=0.005 MIU/L-ACNC: 1.51 UIU/ML (ref 0.45–4.5)
VIT B12 SERPL-MCNC: 387 PG/ML (ref 232–1245)
VLDLC SERPL CALC-MCNC: 15 MG/DL (ref 5–40)
WBC # BLD AUTO: 5 X10E3/UL (ref 3.4–10.8)

## 2022-05-03 ENCOUNTER — OFFICE VISIT (OUTPATIENT)
Dept: INTERNAL MEDICINE | Facility: IMAGING CENTER | Age: 77
End: 2022-05-03
Payer: COMMERCIAL

## 2022-05-03 VITALS
HEIGHT: 63 IN | OXYGEN SATURATION: 95 % | WEIGHT: 124 LBS | HEART RATE: 68 BPM | SYSTOLIC BLOOD PRESSURE: 122 MMHG | DIASTOLIC BLOOD PRESSURE: 62 MMHG | TEMPERATURE: 97.9 F | BODY MASS INDEX: 21.97 KG/M2 | RESPIRATION RATE: 17 BRPM

## 2022-05-03 DIAGNOSIS — I47.10 PAROXYSMAL SUPRAVENTRICULAR TACHYCARDIA (HCC): Chronic | ICD-10-CM

## 2022-05-03 DIAGNOSIS — Z00.00 ENCOUNTER FOR MEDICARE ANNUAL WELLNESS EXAM: ICD-10-CM

## 2022-05-03 DIAGNOSIS — K75.4 AUTOIMMUNE HEPATITIS (HCC): Chronic | ICD-10-CM

## 2022-05-03 DIAGNOSIS — I10 ESSENTIAL HYPERTENSION: ICD-10-CM

## 2022-05-03 DIAGNOSIS — R73.09 ELEVATED HEMOGLOBIN A1C: ICD-10-CM

## 2022-05-03 DIAGNOSIS — M85.89 OSTEOPENIA OF MULTIPLE SITES: Chronic | ICD-10-CM

## 2022-05-03 DIAGNOSIS — E28.39 ESTROGEN DEFICIENCY: ICD-10-CM

## 2022-05-03 DIAGNOSIS — M85.9 DISORDER OF BONE DENSITY AND STRUCTURE, UNSPECIFIED: ICD-10-CM

## 2022-05-03 DIAGNOSIS — E03.9 ACQUIRED HYPOTHYROIDISM: Chronic | ICD-10-CM

## 2022-05-03 DIAGNOSIS — Z12.31 ENCOUNTER FOR SCREENING MAMMOGRAM FOR BREAST CANCER: ICD-10-CM

## 2022-05-03 DIAGNOSIS — Z12.11 COLON CANCER SCREENING: ICD-10-CM

## 2022-05-03 DIAGNOSIS — M15.9 GENERALIZED OA: ICD-10-CM

## 2022-05-03 DIAGNOSIS — I34.0 MITRAL VALVE INSUFFICIENCY, UNSPECIFIED ETIOLOGY: Chronic | ICD-10-CM

## 2022-05-03 PROCEDURE — G0439 PPPS, SUBSEQ VISIT: HCPCS | Performed by: FAMILY MEDICINE

## 2022-05-03 RX ORDER — AZATHIOPRINE 50 MG/1
TABLET ORAL
COMMUNITY
Start: 2022-03-26

## 2022-05-03 ASSESSMENT — ENCOUNTER SYMPTOMS: GENERAL WELL-BEING: GOOD

## 2022-05-03 ASSESSMENT — PATIENT HEALTH QUESTIONNAIRE - PHQ9: CLINICAL INTERPRETATION OF PHQ2 SCORE: 0

## 2022-05-03 ASSESSMENT — FIBROSIS 4 INDEX: FIB4 SCORE: 2.56

## 2022-05-03 ASSESSMENT — ACTIVITIES OF DAILY LIVING (ADL): BATHING_REQUIRES_ASSISTANCE: 0

## 2022-05-05 NOTE — PROGRESS NOTES
CC:   Medicare Annual Wellness Visit    HPI:  Angela is a 76 y.o. female here for her Medicare Annual Wellness Visit and to review labs done 4/29/22. She continues to self isolate at home due to ongoing COVID concerns (rarely leaves her home) and has questions regarding current status of COVID-19 in our community. She suffers from chronic OA, mainly affecting b/l hips currently, and uses Diclofenac gel for pain relief. She has failed Salon Pas patches and does not tolerate oral medications well. She has chronic acquired hypothyroidism with daily Synthroid use, and chronic essential HTN/history of PSVT well controlled with daily Losartan and Diltiazam use. She continues to take Vitamin D3 and calcium for history of osteopenia, and she has history of autoimmune hepatitis controlled with Azathioprine managed by Dr. Gavin. She would like to transfer her GI/hepatology care to [facility] and will discuss colonoscopy when she establishes care at their office. She is due for screening mammogram and repeat dexa scan for ongoing health care management, denies new mental health concerns, and is in good spirits today.     Patient Active Problem List    Diagnosis Date Noted   • Generalized OA 04/12/2019   • Elevated hemoglobin A1c 09/26/2018   • Essential hypertension 02/12/2018   • Osteopenia of multiple sites 05/17/2017   • Acquired hypothyroidism 05/17/2017   • Autoimmune hepatitis (CMS-HCC) 10/10/2014   • Mitral regurgitation 10/10/2014   • Paroxysmal supraventricular tachycardia, details unknow - dx 3-5 y ago 04/19/2013     Current Outpatient Medications   Medication Sig Dispense Refill   • azaTHIOprine (IMURAN) 50 MG Tab      • diclofenac sodium (VOLTAREN) 1 % Gel Apply 4 gram to affected area twice daily. 800 g 3   • losartan (COZAAR) 25 MG Tab Take 1 Tablet by mouth every day. 90 Tablet 3   • DILTIAZem CD (CARTIA XT) 120 MG CAPSULE SR 24 HR Take 1 Capsule by mouth every day. 90 Capsule 3   • SYNTHROID 125 MCG Tab TAKE ONE  TABLET BY MOUTH EVERY MORNING ON AN EMPTY STOMACH 90 Tablet 0   • estradiol (ESTRACE) 1 MG Tab Take 1 tablet by mouth once daily 90 Tablet 0   • Cholecalciferol (VITAMIN D3 PO) Take 2,000 mg by mouth.     • montelukast (SINGULAIR) 10 MG Tab TAKE ONE TABLET BY MOUTH ONE TIME DAILY 90 Tablet 3   • CALCIUM CARB-CHOLECALCIFEROL PO Take 1 Dose by mouth every day. Calcium 1000 mg/ Vitamin D 2000 IU daily     • Azathioprine 75 MG Tab Take 1 Tab by mouth every day. (Patient taking differently: Take 75 mg by mouth every day. 50 mg and 75 mg alternating dosages) 90 Tab 4   • aspirin EC (ECOTRIN) 81 MG Tablet Delayed Response Take 1 Tab by mouth every day. 90 Tab 4   • fexofenadine (ALLEGRA) 180 MG tablet Take 180 mg by mouth 1 time daily as needed. Indications: Hayfever     • docosahexanoic acid (OMEGA 3 FA) 1000 MG CAPS Take 1,000 mg by mouth every day.       No current facility-administered medications for this visit.      Current supplements: see MAR  Chronic narcotic pain medicines: no  Allergies: Statins [hmg-coa-r inhibitors], Chlordiazepoxide hcl, Codeine, Hydrocodone, Librium, Lopressor [kdc:ci pigment blue 63+metoprolol], and Sulphadimidine sodium [sulfamethazine sodium]  Exercise: yes  Current social contact/activities: no  Current mood: good  Advance Directive on file: yes    Screening:  Depression Screening  Little interest or pleasure in doing things?  0 - not at all  Feeling down, depressed , or hopeless? 0 - not at all  Patient Health Questionnaire Score: 0     If depressive symptoms identified deferred to follow up visit unless specifically addressed in assessment and plan.    Interpretation of PHQ-9 Total Score   Score Severity   1-4 No Depression   5-9 Mild Depression   10-14 Moderate Depression   15-19 Moderately Severe Depression   20-27 Severe Depression    Screening for Cognitive Impairment  Three Minute Recall (daughter, heaven, mountain) 3/3    Priyank clock face with all 12 numbers and set the hands to  show 10 past 11.  Yes    Cognitive concerns identified deferred for follow up unless specifically addressed in assessment and plan.    Fall Risk Assessment  Has the patient had two or more falls in the last year or any fall with injury in the last year?  No    Safety Assessment  Throw rugs on floor.  No  Handrails on all stairs.  Yes  Good lighting in all hallways.  Yes  Difficulty hearing.  No  Patient counseled about all safety risks that were identified.    Functional Assessment ADLs  Are there any barriers preventing you from cooking for yourself or meeting nutritional needs?  No.    Are there any barriers preventing you from driving safely or obtaining transportation?  No.    Are there any barriers preventing you from using a telephone or calling for help?  No.    Are there any barriers preventing you from shopping?  No.    Are there any barriers preventing you from taking care of your own finances?  No.    Are there any barriers preventing you from managing your medications?  No.    Are there any barriers preventing you from showering, bathing or dressing yourself?  No.    Are you currently engaging in any exercise or physical activity?  Yes.     What is your perception of your health?  Good.      Health Maintenance Summary          Ordered - MAMMOGRAM (Yearly) Ordered on 5/3/2022    01/13/2020  MA-SCREENING MAMMO BILAT W/TOMOSYNTHESIS W/CAD    11/21/2018  MA-SCREENING MAMMO BILAT W/TOMOSYNTHESIS W/CAD    07/24/2017  [id number]    07/18/2016  [id number]    01/30/2007  MA-SCREENING DIGITAL MAMMO    Only the first 5 history entries have been loaded, but more history exists.          Overdue - COLORECTAL CANCER SCREENING (COLONOSCOPY - Every 5 Years) Overdue since 2/17/2021 02/17/2016  REFERRAL TO GI FOR COLONOSCOPY          Postponed - IMM PNEUMOCOCCAL VACCINE: 65+ Years (3 - PPSV23 or PCV20) Postponed until 5/3/2023    09/16/2015  Imm Admin: Pneumococcal Conjugate Vaccine  (Prevnar/PCV-13)    05/01/2014  Imm Admin: Pneumococcal polysaccharide vaccine (PPSV-23)          Ordered - BONE DENSITY (Every 5 Years) Ordered on 5/3/2022    09/14/2017  DS-BONE DENSITY STUDY (DEXA)    06/17/2005  DS-BONE DENSITY STUDY (DEXA)          Annual Wellness Visit (Every 366 Days) Next due on 5/6/2023 05/05/2022  Subsequent Annual Wellness Visit - Includes PPPS ()    05/03/2022  Visit Dx: Encounter for Medicare annual wellness exam    04/28/2021  Subsequent Annual Wellness Visit - Includes PPPS ()    04/28/2021  Visit Dx: Encounter for Medicare annual wellness exam    03/02/2020  Visit Dx: Encounter for Medicare annual wellness exam    Only the first 5 history entries have been loaded, but more history exists.          IMM DTaP/Tdap/Td Vaccine (2 - Td or Tdap) Next due on 9/14/2027 09/14/2017  Imm Admin: Tdap Vaccine          IMM INFLUENZA (Series Information) Completed    11/02/2021  Imm Admin: Influenza Vaccine Adult HD    09/30/2020  Imm Admin: Influenza Vaccine Adult HD    10/10/2019  Imm Admin: Influenza Vac Subunit Quad Inj (Pf)    09/20/2018  Imm Admin: Influenza Vaccine Adult HD    09/14/2017  Imm Admin: Influenza Vaccine Adult HD    Only the first 5 history entries have been loaded, but more history exists.          IMM MENINGOCOCCAL VACCINE (MCV4) (Series Information) Aged Out    No completion history exists for this topic.          Discontinued - PAP SMEAR  Discontinued    No completion history exists for this topic.          Discontinued - IMM HEP B VACCINE  Discontinued    No completion history exists for this topic.          Discontinued - IMM ZOSTER VACCINES  Discontinued    No completion history exists for this topic.          Discontinued - COVID-19 Vaccine  Discontinued    No completion history exists for this topic.          Discontinued - HEPATITIS C SCREENING  Discontinued    No completion history exists for this topic.                Patient Care Team:  Enedelia ADLER  "OTONIEL Bourne as PCP - General (Family Medicine)      Social History     Tobacco Use   • Smoking status: Never Smoker   • Smokeless tobacco: Never Used   Substance Use Topics   • Alcohol use: No   • Drug use: No     Family History   Problem Relation Age of Onset   • Heart Disease Mother    • Cancer Mother    • Heart Disease Father    • Cancer Father    • Heart Disease Sister    • Heart Attack Sister    • Heart Disease Brother    • Heart Attack Brother    • Heart Disease Brother    • Heart Attack Brother    • Diabetes Child    • Psychiatric Illness Child         hodgkins survivor     She  has a past medical history of Autoimmune hepatitis (HCC), B12 deficiency, Congenital malrotation of intestine, Fatigue (5/22/2013), Other chest pain (4/19/2013), Paroxysmal supraventricular tachycardia, details unknow - dx 3-5 y ago (4/19/2013), and Thyroid disease.   Past Surgical History:   Procedure Laterality Date   • LUMPECTOMY  1982    right breast   • ABDOMINAL HYSTERECTOMY TOTAL  1970   • HERNIA REPAIR  1970    umbilical   • TONSILLECTOMY  1954   • APPENDECTOMY  1950   • OTHER ABDOMINAL SURGERY  1965-66    congenital malrotation of intestines   • PRIMARY C SECTION  1968/1970       ROS:    All positives noted in HPI. All others reviewed and are negative.    Ostomy or other tubes or amputations: no  Chronic oxygen use: no  Last eye exam: UTD per report  : denies urinary incontinence; does not interfere with ADLs/ sleep  Gait: stable  Problems with balance/ difficulty walking: no  Hearing: good   Dentition: adequate    Lab results 4/29/22 reviewed with patient at visit today.     Exam:   /62 (BP Location: Left arm, Patient Position: Sitting, BP Cuff Size: Adult)   Pulse 68   Temp 36.6 °C (97.9 °F) (Temporal)   Resp 17   Ht 1.6 m (5' 3\")   Wt 56.2 kg (124 lb)   SpO2 95%  Body mass index is 21.97 kg/m².    Gen: Well developed; well nourished; no acute distress; age appropriate appearance   HEENT: Normocephalic; " atraumatic; PEERLA b/l; sclera clear b/l; b/l external auditory canals WNL; b/l TM WNL; nares patent; oropharynx clear; mask in place  Neck: No adenopathy; no thyromegaly  CV: Regular rate and rhythm; S1/ S2 present; no murmur, gallop or rub noted  Pulm: No respiratory distress; clear to ascultation b/l; no wheezing or stridor noted b/l  Abd: Adequate bowel sounds noted; soft and nontender; no rebound, rigidity, nor distention  Extremities: No peripheral edema b/l LE extremities/ no clubbing nor cyanosis noted  Skin: Warm and dry; no rashes noted   Neuro: No focal deficits noted; pt is able to get up out of chair unassisted and walk forward  Psych: AAOx4; mood and affect are at her baseline    Assessment and Plan:  1. Generalized OA  Ongoing, chronic condition for patient. She can continue to use Voltaren 1% gel (failed Salon Pas patches and cannot tolerate PO control medication for condition) for symptom management. New RX sent to pharmacy.   - diclofenac sodium (VOLTAREN) 1 % Gel; Apply 4 gram to affected area twice daily.  Dispense: 800 g; Refill: 3  - Subsequent Annual Wellness Visit - Includes PPPS ()    2. Autoimmune hepatitis (CMS-HCC)  Stable/ controlled with ongoing Azathioprine use managed currently by Dr. Gavin. Patient would like to transfer care to Dr. Moy, and new referral made to [facility].   - Referral to Gastroenterology  - Subsequent Annual Wellness Visit - Includes PPPS ()    3. Colon cancer screening  Patient will discuss need for screening colonoscopy when she establishes care with Dr. Moy.   - Referral to Gastroenterology  - Subsequent Annual Wellness Visit - Includes PPPS ()    4. Encounter for screening mammogram for breast cancer  Patient is overdue for screening mammogram due to COVID concerns, and I encouraged her to schedule imaging appointment in near future (reassured patient that they are taking all necessary precautions at imaging locations).   - MA-SCREENING  MAMMO BILAT W/TOMOSYNTHESIS W/CAD; Future  - Subsequent Annual Wellness Visit - Includes PPPS ()    5. Estrogen deficiency  Patient is due for repeat dexa scan, and I encouraged her to make dexa scan and mammogram appointments together.   - DS-BONE DENSITY STUDY (DEXA); Future  - Subsequent Annual Wellness Visit - Includes PPPS ()    6. Disorder of bone density and structure, unspecified  - DS-BONE DENSITY STUDY (DEXA); Future  - Subsequent Annual Wellness Visit - Includes PPPS ()    7. Osteopenia of multiple sites  Refer to #5/ recommend patient continue current Vitamin D and calcium use and fall precautions.  - Subsequent Annual Wellness Visit - Includes PPPS ()    8. Acquired hypothyroidism  Stable/ patient can continue current Synthroid use.  - Subsequent Annual Wellness Visit - Includes PPPS ()    9. Elevated hemoglobin A1c  Resolved/ HgA1c remains WNL at this time.   - Subsequent Annual Wellness Visit - Includes PPPS ()    10. Essential hypertension  Stable/ recommend patient continue current medication use.   - Subsequent Annual Wellness Visit - Includes PPPS ()    11. Paroxysmal supraventricular tachycardia, details unknow - dx 3-5 y ago  Stable without any recent reported events/ managed by Dr. Harley  - Subsequent Annual Wellness Visit - Includes PPPS ()    12. Mitral valve insufficiency, unspecified etiology  Stable/ followed by Dr. Harley  - Subsequent Annual Wellness Visit - Includes PPPS ()    13. Encounter for Medicare annual wellness exam  Patient is stable overall and remains well informed about chronic medical conditions that need ongoing attention. I encouraged her to continue COVID vigilance but also recommended that she consider leaving her house more often for mental health benefits.   - Subsequent Annual Wellness Visit - Includes PPPS ()       Services needed: no new services needed at this time  Health Care Screening: recommendations  as per orders if indicated.  Referrals offered: C   Counseling provided today:  · Prevent falls and reduce trip hazards; Secure or remove rugs if present   · Maintain working fire alarm and carbon monoxide detectors   · Engage in regular physical activity daily and social activities weekly as tolerated

## 2022-05-11 DIAGNOSIS — Z78.0 POSTMENOPAUSAL: ICD-10-CM

## 2022-05-11 DIAGNOSIS — J30.1 SEASONAL ALLERGIC RHINITIS DUE TO POLLEN: ICD-10-CM

## 2022-05-11 DIAGNOSIS — E03.9 ACQUIRED HYPOTHYROIDISM: ICD-10-CM

## 2022-05-11 RX ORDER — LEVOTHYROXINE SODIUM 125 MCG
125 TABLET ORAL
Qty: 90 TABLET | Refills: 3 | Status: SHIPPED | OUTPATIENT
Start: 2022-05-11 | End: 2023-05-08

## 2022-05-11 RX ORDER — ESTRADIOL 1 MG/1
1 TABLET ORAL DAILY
Qty: 90 TABLET | Refills: 3 | Status: SHIPPED | OUTPATIENT
Start: 2022-05-11 | End: 2023-05-08

## 2022-05-11 RX ORDER — MONTELUKAST SODIUM 10 MG/1
10 TABLET ORAL DAILY
Qty: 90 TABLET | Refills: 3 | Status: SHIPPED | OUTPATIENT
Start: 2022-05-11 | End: 2023-05-08

## 2022-07-27 ENCOUNTER — HOSPITAL ENCOUNTER (OUTPATIENT)
Dept: RADIOLOGY | Facility: MEDICAL CENTER | Age: 77
End: 2022-07-27
Attending: INTERNAL MEDICINE
Payer: COMMERCIAL

## 2022-07-27 ENCOUNTER — HOSPITAL ENCOUNTER (OUTPATIENT)
Dept: RADIOLOGY | Facility: MEDICAL CENTER | Age: 77
End: 2022-07-27
Attending: FAMILY MEDICINE
Payer: COMMERCIAL

## 2022-07-27 DIAGNOSIS — E28.39 ESTROGEN DEFICIENCY: ICD-10-CM

## 2022-07-27 DIAGNOSIS — I10 ESSENTIAL HYPERTENSION, MALIGNANT: ICD-10-CM

## 2022-07-27 DIAGNOSIS — M85.9 DISORDER OF BONE DENSITY AND STRUCTURE, UNSPECIFIED: ICD-10-CM

## 2022-07-27 DIAGNOSIS — Z12.31 ENCOUNTER FOR SCREENING MAMMOGRAM FOR BREAST CANCER: ICD-10-CM

## 2022-07-27 DIAGNOSIS — K74.60 CIRRHOSIS OF LIVER WITHOUT ASCITES, UNSPECIFIED HEPATIC CIRRHOSIS TYPE (HCC): ICD-10-CM

## 2022-07-27 DIAGNOSIS — K75.4 CHRONIC AGGRESSIVE HEPATITIS (HCC): ICD-10-CM

## 2022-07-27 PROCEDURE — 76705 ECHO EXAM OF ABDOMEN: CPT

## 2022-07-27 PROCEDURE — 77063 BREAST TOMOSYNTHESIS BI: CPT

## 2022-07-27 PROCEDURE — 77080 DXA BONE DENSITY AXIAL: CPT

## 2022-10-27 ENCOUNTER — HOSPITAL ENCOUNTER (OUTPATIENT)
Facility: MEDICAL CENTER | Age: 77
End: 2022-10-27
Attending: INTERNAL MEDICINE
Payer: COMMERCIAL

## 2022-10-27 ENCOUNTER — NON-PROVIDER VISIT (OUTPATIENT)
Dept: INTERNAL MEDICINE | Facility: IMAGING CENTER | Age: 77
End: 2022-10-27
Payer: COMMERCIAL

## 2022-10-27 LAB
FORWARD REASON: SPWHY: NORMAL
FORWARDED TO LAB: SPWHR: NORMAL
SPECIMEN SENT (2ND): SPWT2: NORMAL
SPECIMEN SENT (3RD): SPWT3: NORMAL
SPECIMEN SENT: SPWT1: NORMAL

## 2022-11-09 ENCOUNTER — PATIENT MESSAGE (OUTPATIENT)
Dept: HEALTH INFORMATION MANAGEMENT | Facility: OTHER | Age: 77
End: 2022-11-09

## 2023-02-08 ENCOUNTER — OFFICE VISIT (OUTPATIENT)
Dept: CARDIOLOGY | Facility: MEDICAL CENTER | Age: 78
End: 2023-02-08
Payer: COMMERCIAL

## 2023-02-08 VITALS
SYSTOLIC BLOOD PRESSURE: 112 MMHG | WEIGHT: 125 LBS | HEIGHT: 63 IN | OXYGEN SATURATION: 95 % | HEART RATE: 87 BPM | RESPIRATION RATE: 12 BRPM | DIASTOLIC BLOOD PRESSURE: 64 MMHG | BODY MASS INDEX: 22.15 KG/M2

## 2023-02-08 DIAGNOSIS — I47.10 PAROXYSMAL SUPRAVENTRICULAR TACHYCARDIA (HCC): ICD-10-CM

## 2023-02-08 DIAGNOSIS — I10 ESSENTIAL HYPERTENSION: ICD-10-CM

## 2023-02-08 DIAGNOSIS — I34.0 NONRHEUMATIC MITRAL VALVE REGURGITATION: ICD-10-CM

## 2023-02-08 PROCEDURE — 99214 OFFICE O/P EST MOD 30 MIN: CPT | Performed by: INTERNAL MEDICINE

## 2023-02-08 RX ORDER — LOSARTAN POTASSIUM 25 MG/1
25 TABLET ORAL DAILY
Qty: 100 TABLET | Refills: 3 | Status: SHIPPED | OUTPATIENT
Start: 2023-02-08 | End: 2023-12-19 | Stop reason: SDUPTHER

## 2023-02-08 RX ORDER — DILTIAZEM HYDROCHLORIDE 120 MG/1
120 CAPSULE, COATED, EXTENDED RELEASE ORAL DAILY
Qty: 100 CAPSULE | Refills: 3 | Status: SHIPPED | OUTPATIENT
Start: 2023-02-08 | End: 2024-03-26 | Stop reason: SDUPTHER

## 2023-02-08 ASSESSMENT — FIBROSIS 4 INDEX: FIB4 SCORE: 2.59

## 2023-02-08 NOTE — PROGRESS NOTES
Chief Complaint   Patient presents with    Hypertension     Fv  Dx: Essential hypertension    Paroxysmal Supraventricular Tachycardia (PSVT)       Subjective     She Davis is a 77 y.o. female who presents today for follow-up of PSVT well-controlled on CCB for many years.  She has some mild to moderate mitral insufficiency as well which has been stable.    In the interim she feels well and has been taking her medications as directed without significant episodes of palpitations.  No dyspnea.    Past Medical History:   Diagnosis Date    Autoimmune hepatitis (HCC)     B12 deficiency     Congenital malrotation of intestine     Fatigue 5/22/2013    Other chest pain 4/19/2013    Paroxysmal supraventricular tachycardia, details unknow - dx 3-5 y ago 4/19/2013    Thyroid disease      Past Surgical History:   Procedure Laterality Date    LUMPECTOMY  1982    right breast    ABDOMINAL HYSTERECTOMY TOTAL  1970    HERNIA REPAIR  1970    umbilical    TONSILLECTOMY  1954    APPENDECTOMY  1950    OTHER ABDOMINAL SURGERY  1965-66    congenital malrotation of intestines    PRIMARY C SECTION  1968/1970     Family History   Problem Relation Age of Onset    Heart Disease Mother     Cancer Mother     Heart Disease Father     Cancer Father     Heart Disease Sister     Heart Attack Sister     Heart Disease Brother     Heart Attack Brother     Heart Disease Brother     Heart Attack Brother     Diabetes Child     Psychiatric Illness Child         hodgkins survivor     Social History     Socioeconomic History    Marital status:      Spouse name: Not on file    Number of children: Not on file    Years of education: Not on file    Highest education level: Not on file   Occupational History    Not on file   Tobacco Use    Smoking status: Never    Smokeless tobacco: Never   Substance and Sexual Activity    Alcohol use: No    Drug use: No    Sexual activity: Not Currently     Partners: Male   Other Topics Concern    Not on file    Social History Narrative    Not on file     Social Determinants of Health     Financial Resource Strain: Not on file   Food Insecurity: Not on file   Transportation Needs: Not on file   Physical Activity: Not on file   Stress: Not on file   Social Connections: Not on file   Intimate Partner Violence: Not on file   Housing Stability: Not on file     Allergies   Allergen Reactions    Statins [Hmg-Coa-R Inhibitors]      Autoimmune hepatitis    Chlordiazepoxide Hcl      Causes hyperactivity    Codeine     Hydrocodone     Librium     Lopressor [Kdc:Ci Pigment Blue 63+Metoprolol]      Does not tolerate beta blockers    Sulphadimidine Sodium [Sulfamethazine Sodium]      Outpatient Encounter Medications as of 2/8/2023   Medication Sig Dispense Refill    losartan (COZAAR) 25 MG Tab Take 1 Tablet by mouth every day. 100 Tablet 3    DILTIAZem CD (CARTIA XT) 120 MG CAPSULE SR 24 HR Take 1 Capsule by mouth every day. 100 Capsule 3    montelukast (SINGULAIR) 10 MG Tab Take 1 Tablet by mouth every day. 90 Tablet 3    SYNTHROID 125 MCG Tab Take 1 Tablet by mouth every morning on an empty stomach. 90 Tablet 3    estradiol (ESTRACE) 1 MG Tab Take 1 Tablet by mouth every day. 90 Tablet 3    azaTHIOprine (IMURAN) 50 MG Tab       diclofenac sodium (VOLTAREN) 1 % Gel Apply 4 gram to affected area twice daily. 800 g 3    Cholecalciferol (VITAMIN D3 PO) Take 2,000 mg by mouth.      CALCIUM CARB-CHOLECALCIFEROL PO Take 1 Dose by mouth every day. Calcium 1000 mg/ Vitamin D 2000 IU daily      Azathioprine 75 MG Tab Take 1 Tab by mouth every day. (Patient taking differently: Take 75 mg by mouth every day. 50 mg and 75 mg alternating dosages) 90 Tab 4    aspirin EC (ECOTRIN) 81 MG Tablet Delayed Response Take 1 Tab by mouth every day. 90 Tab 4    fexofenadine (ALLEGRA) 180 MG tablet Take 180 mg by mouth 1 time daily as needed. Indications: Hayfever      docosahexanoic acid (OMEGA 3 FA) 1000 MG CAPS Take 1,000 mg by mouth every day.       "[DISCONTINUED] losartan (COZAAR) 25 MG Tab Take 1 Tablet by mouth every day. 90 Tablet 3    [DISCONTINUED] DILTIAZem CD (CARTIA XT) 120 MG CAPSULE SR 24 HR Take 1 Capsule by mouth every day. 90 Capsule 3     No facility-administered encounter medications on file as of 2/8/2023.     Review of Systems   All other systems reviewed and are negative.           Objective     /64 (BP Location: Left arm, Patient Position: Sitting, BP Cuff Size: Adult)   Pulse 87   Resp 12   Ht 1.6 m (5' 3\")   Wt 56.7 kg (125 lb)   SpO2 95%   BMI 22.14 kg/m²     Physical Exam  Vitals reviewed.   Constitutional:       General: She is not in acute distress.     Appearance: She is well-developed. She is not diaphoretic.   HENT:      Head: Normocephalic and atraumatic.      Right Ear: External ear normal.      Left Ear: External ear normal.      Mouth/Throat:      Pharynx: No oropharyngeal exudate.   Eyes:      General: No scleral icterus.        Right eye: No discharge.         Left eye: No discharge.      Conjunctiva/sclera: Conjunctivae normal.      Pupils: Pupils are equal, round, and reactive to light.   Neck:      Thyroid: No thyromegaly.      Vascular: No JVD.      Trachea: No tracheal deviation.   Cardiovascular:      Rate and Rhythm: Normal rate and regular rhythm.      Chest Wall: PMI is not displaced.      Pulses:           Carotid pulses are 2+ on the right side and 2+ on the left side.       Radial pulses are 2+ on the right side and 2+ on the left side.        Popliteal pulses are 2+ on the right side and 2+ on the left side.        Dorsalis pedis pulses are 2+ on the right side and 2+ on the left side.        Posterior tibial pulses are 2+ on the right side and 2+ on the left side.      Heart sounds: Normal heart sounds, S1 normal and S2 normal. No murmur heard.    No friction rub. No gallop. No S3 or S4 sounds.   Pulmonary:      Effort: Pulmonary effort is normal. No respiratory distress.      Breath sounds: Normal " breath sounds. No wheezing or rales.   Chest:      Chest wall: No tenderness.   Abdominal:      General: Bowel sounds are normal. There is no distension.      Palpations: Abdomen is soft.      Tenderness: There is no abdominal tenderness.   Musculoskeletal:         General: No tenderness. Normal range of motion.      Cervical back: Normal range of motion and neck supple.   Skin:     General: Skin is warm and dry.      Findings: No erythema or rash.   Neurological:      Mental Status: She is alert and oriented to person, place, and time.      Cranial Nerves: No cranial nerve deficit (Cranial nerves II through XII grossly intact).   Psychiatric:         Behavior: Behavior normal.         Thought Content: Thought content normal.         Judgment: Judgment normal.     LABS:  Lab Results   Component Value Date/Time    CHOLSTRLTOT 222 (H) 04/25/2022 09:00 PM    CHOLSTRLTOT 220 (H) 04/22/2021 09:30 AM     (H) 04/22/2021 09:30 AM    HDL 83 04/25/2022 09:00 PM    HDL 88 04/22/2021 09:30 AM    TRIGLYCERIDE 85 04/25/2022 09:00 PM    TRIGLYCERIDE 70 04/22/2021 09:30 AM       Lab Results   Component Value Date/Time    WBC 5.0 04/25/2022 09:00 PM    WBC 5.0 11/02/2021 10:45 AM    RBC 4.55 04/25/2022 09:00 PM    RBC 4.64 11/02/2021 10:45 AM    HEMOGLOBIN 15.4 04/25/2022 09:00 PM    HEMOGLOBIN 15.5 11/02/2021 10:45 AM    HEMATOCRIT 45.4 04/25/2022 09:00 PM    HEMATOCRIT 46.6 11/02/2021 10:45 AM     (H) 04/25/2022 09:00 PM    .4 (H) 11/02/2021 10:45 AM    NEUTSPOLYS 66 04/25/2022 09:00 PM    NEUTSPOLYS 66.10 11/02/2021 10:45 AM    LYMPHOCYTES 15 04/25/2022 09:00 PM    LYMPHOCYTES 14.70 (L) 11/02/2021 10:45 AM    MONOCYTES 14 04/25/2022 09:00 PM    MONOCYTES 11.90 11/02/2021 10:45 AM    EOSINOPHILS 4 04/25/2022 09:00 PM    EOSINOPHILS 6.10 11/02/2021 10:45 AM    BASOPHILS 1 04/25/2022 09:00 PM    BASOPHILS 0.80 11/02/2021 10:45 AM     Lab Results   Component Value Date/Time    SODIUM 139 04/25/2022 09:00 PM     SODIUM 141 11/02/2021 10:45 AM    POTASSIUM 3.9 04/25/2022 09:00 PM    POTASSIUM 3.9 11/02/2021 10:45 AM    CHLORIDE 100 04/25/2022 09:00 PM    CHLORIDE 106 11/02/2021 10:45 AM    CO2 24 04/25/2022 09:00 PM    CO2 26 11/02/2021 10:45 AM    GLUCOSE 81 04/25/2022 09:00 PM    GLUCOSE 80 11/02/2021 10:45 AM    BUN 19 04/25/2022 09:00 PM    BUN 16 11/02/2021 10:45 AM    CREATININE 0.80 04/25/2022 09:00 PM    CREATININE 0.85 11/02/2021 10:45 AM    BUNCREATRAT 24 04/25/2022 09:00 PM     Lab Results   Component Value Date    HBA1C 5.5 04/25/2022    HBA1C 5.5 04/22/2021      Lab Results   Component Value Date/Time    ALKPHOSPHAT 55 04/25/2022 09:00 PM    ALKPHOSPHAT 57 11/02/2021 10:45 AM    ASTSGOT 24 04/25/2022 09:00 PM    ASTSGOT 22 11/02/2021 10:45 AM    ALTSGPT 16 04/25/2022 09:00 PM    ALTSGPT 19 11/02/2021 10:45 AM    TBILIRUBIN 0.6 04/25/2022 09:00 PM    TBILIRUBIN 0.4 11/02/2021 10:45 AM      No results found for: BNPBTYPENAT   Lab Results   Component Value Date/Time    TSH 1.510 04/25/2022 09:00 PM    TSH 3.370 09/29/2020 09:00 PM     Lab Results   Component Value Date/Time    PROTHROMBTM 12.8 04/26/2022 09:20 AM    INR 0.99 04/26/2022 09:20 AM                 Assessment & Plan     1. Essential hypertension  losartan (COZAAR) 25 MG Tab    DILTIAZem CD (CARTIA XT) 120 MG CAPSULE SR 24 HR      2. Paroxysmal supraventricular tachycardia (HCC)  losartan (COZAAR) 25 MG Tab    DILTIAZem CD (CARTIA XT) 120 MG CAPSULE SR 24 HR      3. Nonrheumatic mitral valve regurgitation  EC-ECHOCARDIOGRAM COMPLETE W/O CONT          Medical Decision Making: Today's Assessment/Status/Plan:          Doing well.  Stable symptoms.  Almost no issues with her palpitations or PSVT.  Refilled her medications without change.  Echocardiogram and follow-up MRI as it has been around 5 years.  Follow-up next year if echo is stable.

## 2023-05-02 ENCOUNTER — HOSPITAL ENCOUNTER (OUTPATIENT)
Dept: RADIOLOGY | Facility: MEDICAL CENTER | Age: 78
End: 2023-05-02
Attending: INTERNAL MEDICINE
Payer: COMMERCIAL

## 2023-05-02 DIAGNOSIS — K75.4 CHRONIC AGGRESSIVE HEPATITIS (HCC): ICD-10-CM

## 2023-05-02 DIAGNOSIS — I10 ESSENTIAL HYPERTENSION, MALIGNANT: ICD-10-CM

## 2023-05-02 DIAGNOSIS — K74.60 CIRRHOSIS OF LIVER WITHOUT ASCITES, UNSPECIFIED HEPATIC CIRRHOSIS TYPE (HCC): ICD-10-CM

## 2023-05-02 PROCEDURE — 76705 ECHO EXAM OF ABDOMEN: CPT

## 2023-05-17 DIAGNOSIS — R73.9 HYPERGLYCEMIA: ICD-10-CM

## 2023-05-17 DIAGNOSIS — D75.89 MACROCYTOSIS: ICD-10-CM

## 2023-05-17 DIAGNOSIS — E03.9 ACQUIRED HYPOTHYROIDISM: Chronic | ICD-10-CM

## 2023-05-17 DIAGNOSIS — E55.9 VITAMIN D DEFICIENCY: ICD-10-CM

## 2023-05-17 DIAGNOSIS — E78.2 MIXED DYSLIPIDEMIA: ICD-10-CM

## 2023-05-17 DIAGNOSIS — K75.4 AUTOIMMUNE HEPATITIS (HCC): Chronic | ICD-10-CM

## 2023-05-17 DIAGNOSIS — I10 ESSENTIAL HYPERTENSION: ICD-10-CM

## 2023-05-26 ENCOUNTER — HOSPITAL ENCOUNTER (OUTPATIENT)
Dept: CARDIOLOGY | Facility: MEDICAL CENTER | Age: 78
End: 2023-05-26
Attending: INTERNAL MEDICINE
Payer: COMMERCIAL

## 2023-05-26 DIAGNOSIS — I34.0 NONRHEUMATIC MITRAL VALVE REGURGITATION: ICD-10-CM

## 2023-05-26 PROCEDURE — 93306 TTE W/DOPPLER COMPLETE: CPT

## 2023-05-29 LAB
LV EJECT FRACT  99904: 65
LV EJECT FRACT MOD 2C 99903: 72.57
LV EJECT FRACT MOD 4C 99902: 54.7
LV EJECT FRACT MOD BP 99901: 65.27

## 2023-05-29 PROCEDURE — 93306 TTE W/DOPPLER COMPLETE: CPT | Mod: 26 | Performed by: INTERNAL MEDICINE

## 2023-06-02 ENCOUNTER — HOSPITAL ENCOUNTER (OUTPATIENT)
Facility: MEDICAL CENTER | Age: 78
End: 2023-06-02
Attending: INTERNAL MEDICINE
Payer: COMMERCIAL

## 2023-06-02 ENCOUNTER — NON-PROVIDER VISIT (OUTPATIENT)
Dept: INTERNAL MEDICINE | Facility: IMAGING CENTER | Age: 78
End: 2023-06-02
Payer: COMMERCIAL

## 2023-06-02 ENCOUNTER — HOSPITAL ENCOUNTER (OUTPATIENT)
Facility: MEDICAL CENTER | Age: 78
End: 2023-06-02
Attending: FAMILY MEDICINE
Payer: COMMERCIAL

## 2023-06-02 DIAGNOSIS — Z01.89 ENCOUNTER FOR ROUTINE LABORATORY TESTING: ICD-10-CM

## 2023-06-02 LAB
FORWARD REASON: SPWHY: NORMAL
FORWARD REASON: SPWHY: NORMAL
FORWARDED TO LAB: SPWHR: NORMAL
FORWARDED TO LAB: SPWHR: NORMAL
SPECIMEN SENT (2ND): SPWT2: NORMAL
SPECIMEN SENT (2ND): SPWT2: NORMAL
SPECIMEN SENT (3RD): SPWT3: NORMAL
SPECIMEN SENT (3RD): SPWT3: NORMAL
SPECIMEN SENT (4TH): SPWT4: NORMAL
SPECIMEN SENT (4TH): SPWT4: NORMAL
SPECIMEN SENT: SPWT1: NORMAL
SPECIMEN SENT: SPWT1: NORMAL

## 2023-06-06 LAB
25(OH)D3+25(OH)D2 SERPL-MCNC: 61.3 NG/ML (ref 30–100)
AFP-TM SERPL-MCNC: 3.1 NG/ML (ref 0–9.2)
ALBUMIN SERPL-MCNC: 4.3 G/DL (ref 3.7–4.7)
ALBUMIN/GLOB SERPL: 1.5 {RATIO} (ref 1.2–2.2)
ALP SERPL-CCNC: 60 IU/L (ref 44–121)
ALT SERPL-CCNC: 20 IU/L (ref 0–32)
AST SERPL-CCNC: 25 IU/L (ref 0–40)
BASOPHILS # BLD AUTO: 0.1 X10E3/UL (ref 0–0.2)
BASOPHILS NFR BLD AUTO: 2 %
BILIRUB SERPL-MCNC: 0.6 MG/DL (ref 0–1.2)
BUN SERPL-MCNC: 16 MG/DL (ref 8–27)
BUN/CREAT SERPL: 18 (ref 12–28)
CALCIUM SERPL-MCNC: 9.5 MG/DL (ref 8.7–10.3)
CHLORIDE SERPL-SCNC: 103 MMOL/L (ref 96–106)
CHOLEST SERPL-MCNC: 216 MG/DL (ref 100–199)
CO2 SERPL-SCNC: 22 MMOL/L (ref 20–29)
CREAT SERPL-MCNC: 0.91 MG/DL (ref 0.57–1)
EGFRCR SERPLBLD CKD-EPI 2021: 65 ML/MIN/1.73
EOSINOPHIL # BLD AUTO: 0.2 X10E3/UL (ref 0–0.4)
EOSINOPHIL NFR BLD AUTO: 6 %
ERYTHROCYTE [DISTWIDTH] IN BLOOD BY AUTOMATED COUNT: 11.7 % (ref 11.7–15.4)
FOLATE SERPL-MCNC: >20 NG/ML
GLOBULIN SER CALC-MCNC: 2.9 G/DL (ref 1.5–4.5)
GLUCOSE SERPL-MCNC: 78 MG/DL (ref 70–99)
HBA1C MFR BLD: 5.6 % (ref 4.8–5.6)
HCT VFR BLD AUTO: 43.8 % (ref 34–46.6)
HDLC SERPL-MCNC: 81 MG/DL
HGB BLD-MCNC: 14.9 G/DL (ref 11.1–15.9)
IMM GRANULOCYTES # BLD AUTO: 0 X10E3/UL (ref 0–0.1)
IMM GRANULOCYTES NFR BLD AUTO: 0 %
IMMATURE CELLS  115398: ABNORMAL
LABORATORY COMMENT REPORT: ABNORMAL
LDLC SERPL CALC-MCNC: 122 MG/DL (ref 0–99)
LYMPHOCYTES # BLD AUTO: 0.7 X10E3/UL (ref 0.7–3.1)
LYMPHOCYTES NFR BLD AUTO: 19 %
MCH RBC QN AUTO: 33.3 PG (ref 26.6–33)
MCHC RBC AUTO-ENTMCNC: 34 G/DL (ref 31.5–35.7)
MCV RBC AUTO: 98 FL (ref 79–97)
MONOCYTES # BLD AUTO: 0.6 X10E3/UL (ref 0.1–0.9)
MONOCYTES NFR BLD AUTO: 14 %
MORPHOLOGY BLD-IMP: ABNORMAL
NEUTROPHILS # BLD AUTO: 2.3 X10E3/UL (ref 1.4–7)
NEUTROPHILS NFR BLD AUTO: 59 %
NRBC BLD AUTO-RTO: ABNORMAL %
PLATELET # BLD AUTO: 195 X10E3/UL (ref 150–450)
POTASSIUM SERPL-SCNC: 4 MMOL/L (ref 3.5–5.2)
PROT SERPL-MCNC: 7.2 G/DL (ref 6–8.5)
RBC # BLD AUTO: 4.47 X10E6/UL (ref 3.77–5.28)
SODIUM SERPL-SCNC: 139 MMOL/L (ref 134–144)
T4 FREE SERPL-MCNC: 2.08 NG/DL (ref 0.82–1.77)
TRIGL SERPL-MCNC: 74 MG/DL (ref 0–149)
TSH SERPL DL<=0.005 MIU/L-ACNC: 0.92 UIU/ML (ref 0.45–4.5)
VIT B12 SERPL-MCNC: 556 PG/ML (ref 232–1245)
VLDLC SERPL CALC-MCNC: 13 MG/DL (ref 5–40)
WBC # BLD AUTO: 3.9 X10E3/UL (ref 3.4–10.8)

## 2023-06-09 ENCOUNTER — OFFICE VISIT (OUTPATIENT)
Dept: INTERNAL MEDICINE | Facility: IMAGING CENTER | Age: 78
End: 2023-06-09
Payer: COMMERCIAL

## 2023-06-09 VITALS
WEIGHT: 120 LBS | BODY MASS INDEX: 21.26 KG/M2 | SYSTOLIC BLOOD PRESSURE: 112 MMHG | RESPIRATION RATE: 17 BRPM | DIASTOLIC BLOOD PRESSURE: 62 MMHG | HEART RATE: 68 BPM | TEMPERATURE: 98.3 F | HEIGHT: 63 IN | OXYGEN SATURATION: 97 %

## 2023-06-09 DIAGNOSIS — I10 ESSENTIAL HYPERTENSION: ICD-10-CM

## 2023-06-09 DIAGNOSIS — M15.9 GENERALIZED OA: ICD-10-CM

## 2023-06-09 DIAGNOSIS — Z78.0 POSTMENOPAUSAL: ICD-10-CM

## 2023-06-09 DIAGNOSIS — E03.9 ACQUIRED HYPOTHYROIDISM: ICD-10-CM

## 2023-06-09 DIAGNOSIS — Z92.29 HISTORY OF POSTMENOPAUSAL HRT: ICD-10-CM

## 2023-06-09 DIAGNOSIS — Z12.31 BREAST CANCER SCREENING BY MAMMOGRAM: ICD-10-CM

## 2023-06-09 DIAGNOSIS — K75.4 AUTOIMMUNE HEPATITIS (HCC): Chronic | ICD-10-CM

## 2023-06-09 DIAGNOSIS — R73.09 ELEVATED HEMOGLOBIN A1C: ICD-10-CM

## 2023-06-09 DIAGNOSIS — J30.1 SEASONAL ALLERGIC RHINITIS DUE TO POLLEN: ICD-10-CM

## 2023-06-09 DIAGNOSIS — M85.89 OSTEOPENIA OF MULTIPLE SITES: Chronic | ICD-10-CM

## 2023-06-09 DIAGNOSIS — Z00.00 ENCOUNTER FOR MEDICARE ANNUAL WELLNESS EXAM: ICD-10-CM

## 2023-06-09 PROCEDURE — 3074F SYST BP LT 130 MM HG: CPT | Performed by: FAMILY MEDICINE

## 2023-06-09 PROCEDURE — 3078F DIAST BP <80 MM HG: CPT | Performed by: FAMILY MEDICINE

## 2023-06-09 PROCEDURE — G0439 PPPS, SUBSEQ VISIT: HCPCS | Performed by: FAMILY MEDICINE

## 2023-06-09 RX ORDER — MONTELUKAST SODIUM 10 MG/1
10 TABLET ORAL DAILY
Qty: 90 TABLET | Refills: 3 | Status: SHIPPED | OUTPATIENT
Start: 2023-06-09

## 2023-06-09 RX ORDER — LEVOTHYROXINE SODIUM 125 MCG
125 TABLET ORAL
Qty: 90 TABLET | Refills: 3 | Status: SHIPPED | OUTPATIENT
Start: 2023-06-09

## 2023-06-09 RX ORDER — ESTRADIOL 1 MG/1
1 TABLET ORAL DAILY
Qty: 90 TABLET | Refills: 3 | Status: SHIPPED | OUTPATIENT
Start: 2023-06-09 | End: 2023-08-10 | Stop reason: SDUPTHER

## 2023-06-09 ASSESSMENT — ACTIVITIES OF DAILY LIVING (ADL): BATHING_REQUIRES_ASSISTANCE: 0

## 2023-06-09 ASSESSMENT — PATIENT HEALTH QUESTIONNAIRE - PHQ9: CLINICAL INTERPRETATION OF PHQ2 SCORE: 0

## 2023-06-09 ASSESSMENT — FIBROSIS 4 INDEX: FIB4 SCORE: 2.18

## 2023-06-09 ASSESSMENT — ENCOUNTER SYMPTOMS: GENERAL WELL-BEING: EXCELLENT

## 2023-06-12 NOTE — PROGRESS NOTES
CC:   Medicare Annual Wellness Visit    HPI:  Angela is a 77 y.o. female here for her Medicare Annual Wellness Visit and to review labs done 6/2/23. She continues to self isolate at home due to ongoing COVID concerns (rarely leaves her home) and is not eager to return to normal social engagements. She suffers from chronic OA and uses Diclofenac gel for pain relief. She has failed Salon Pas patches and does not tolerate oral medications well. She has chronic acquired hypothyroidism with daily Synthroid use, and chronic essential HTN/history of PSVT well controlled with daily Losartan and Diltiazam use managed by [facility]. She continues to take Vitamin D3 and calcium for history of osteopenia, and she has history of autoimmune hepatitis controlled with Azathioprine managed by Dr. Moy. She denies new mental health concerns and is in good spirits overall today.     Patient Active Problem List    Diagnosis Date Noted    Generalized OA 04/12/2019    Elevated hemoglobin A1c 09/26/2018    Essential hypertension 02/12/2018    Osteopenia of multiple sites 05/17/2017    Acquired hypothyroidism 05/17/2017    Autoimmune hepatitis (CMS-HCC) 10/10/2014    Mitral regurgitation 10/10/2014    Paroxysmal supraventricular tachycardia, details unknow - dx 3-5 y ago 04/19/2013     Current Outpatient Medications   Medication Sig Dispense Refill    SYNTHROID 125 MCG Tab Take 1 Tablet by mouth every morning on an empty stomach. 90 Tablet 3    montelukast (SINGULAIR) 10 MG Tab Take 1 Tablet by mouth every day. 90 Tablet 3    estradiol (ESTRACE) 1 MG Tab Take 1 Tablet by mouth every day. 90 Tablet 3    diclofenac sodium (VOLTAREN) 1 % Gel Apply 4 gram to affected area twice daily. 800 g 3    losartan (COZAAR) 25 MG Tab Take 1 Tablet by mouth every day. 100 Tablet 3    DILTIAZem CD (CARTIA XT) 120 MG CAPSULE SR 24 HR Take 1 Capsule by mouth every day. 100 Capsule 3    azaTHIOprine (IMURAN) 50 MG Tab       Cholecalciferol  (VITAMIN D3 PO) Take 2,000 mg by mouth.      CALCIUM CARB-CHOLECALCIFEROL PO Take 1 Dose by mouth every day. Calcium 1000 mg/ Vitamin D 2000 IU daily      Azathioprine 75 MG Tab Take 1 Tab by mouth every day. (Patient taking differently: Take 75 mg by mouth every day. 50 mg and 75 mg alternating dosages) 90 Tab 4    aspirin EC (ECOTRIN) 81 MG Tablet Delayed Response Take 1 Tab by mouth every day. 90 Tab 4    fexofenadine (ALLEGRA) 180 MG tablet Take 180 mg by mouth 1 time daily as needed. Indications: Hayfever      docosahexanoic acid (OMEGA 3 FA) 1000 MG CAPS Take 1,000 mg by mouth every day.       No current facility-administered medications for this visit.      Current supplements: see MAR  Chronic narcotic pain medicines: no  Allergies: Statins [hmg-coa-r inhibitors], Chlordiazepoxide hcl, Codeine, Hydrocodone, Librium, Lopressor [kdc:ci pigment blue 63+metoprolol], and Sulphadimidine sodium [sulfamethazine sodium]  Exercise: yes  Current social contact/activities: no  Current mood: good  Advance Directive on file: no    Screening:  Depression Screening  Little interest or pleasure in doing things?  0 - not at all  Feeling down, depressed , or hopeless? 0 - not at all  Patient Health Questionnaire Score: 0     If depressive symptoms identified deferred to follow up visit unless specifically addressed in assessment and plan.    Interpretation of PHQ-9 Total Score   Score Severity   1-4 No Depression   5-9 Mild Depression   10-14 Moderate Depression   15-19 Moderately Severe Depression   20-27 Severe Depression    Screening for Cognitive Impairment  Three Minute Recall (daughter, heaven, mountain) 3/3    Priyank clock face with all 12 numbers and set the hands to show 10 past 11.  Yes    Cognitive concerns identified deferred for follow up unless specifically addressed in assessment and plan.    Fall Risk Assessment  Has the patient had two or more falls in the last year or any fall with injury in the last year?   No    Safety Assessment  Throw rugs on floor.  No  Handrails on all stairs.  Yes  Good lighting in all hallways.  Yes  Difficulty hearing.  No  Patient counseled about all safety risks that were identified.    Functional Assessment ADLs  Are there any barriers preventing you from cooking for yourself or meeting nutritional needs?  No.    Are there any barriers preventing you from driving safely or obtaining transportation?  No.    Are there any barriers preventing you from using a telephone or calling for help?  No.    Are there any barriers preventing you from shopping?  No.    Are there any barriers preventing you from taking care of your own finances?  No.    Are there any barriers preventing you from managing your medications?  No.    Are there any barriers preventing you from showering, bathing or dressing yourself?  No.    Are you currently engaging in any exercise or physical activity?  Yes.     What is your perception of your health?  Excellent    Advance Care Planning  Do you have an Advance Directive, Living Will, Durable Power of , or POLST?                   Health Maintenance Summary            Ordered - MAMMOGRAM (Yearly) Ordered on 6/11/2023 07/27/2022  MA-SCREENING MAMMO BILAT W/TOMOSYNTHESIS W/CAD    01/13/2020  MA-SCREENING MAMMO BILAT W/TOMOSYNTHESIS W/CAD    11/21/2018  MA-SCREENING MAMMO BILAT W/TOMOSYNTHESIS W/CAD    07/24/2017  [id number]    07/18/2016  [id number]    Only the first 5 history entries have been loaded, but more history exists.              Annual Wellness Visit (Every 366 Days) Next due on 6/11/2024 06/11/2023  Subsequent Annual Wellness Visit - Includes PPPS ()    06/09/2023  Visit Dx: Encounter for Medicare annual wellness exam    05/05/2022  Subsequent Annual Wellness Visit - Includes PPPS ()    05/03/2022  Visit Dx: Encounter for Medicare annual wellness exam    04/28/2021  Subsequent Annual Wellness Visit - Includes PPPS  ()    Only the first 5 history entries have been loaded, but more history exists.              BONE DENSITY (Every 5 Years) Next due on 7/27/2027 07/27/2022  DS-BONE DENSITY STUDY (DEXA)    09/14/2017  DS-BONE DENSITY STUDY (DEXA)    06/17/2005  DS-BONE DENSITY STUDY (DEXA)              IMM DTaP/Tdap/Td Vaccine (2 - Td or Tdap) Next due on 9/14/2027 09/14/2017  Imm Admin: Tdap Vaccine              IMM PNEUMOCOCCAL VACCINE: 65+ Years (Series Information) Completed      09/16/2015  Imm Admin: Pneumococcal Conjugate Vaccine (Prevnar/PCV-13)    05/01/2014  Imm Admin: Pneumococcal polysaccharide vaccine (PPSV-23)              IMM INFLUENZA (Series Information) Completed      09/29/2022  Imm Admin: Influenza, Unspecified - HISTORICAL DATA    11/02/2021  Imm Admin: Influenza Vaccine Adult HD    09/30/2020  Imm Admin: Influenza Vaccine Adult HD    10/10/2019  Imm Admin: Influenza Vac Subunit Quad Inj (Pf)    09/20/2018  Imm Admin: Influenza Vaccine Adult HD    Only the first 5 history entries have been loaded, but more history exists.              HPV Vaccines (Series Information) Aged Out      No completion history exists for this topic.              IMM MENINGOCOCCAL ACWY VACCINE (Series Information) Aged Out      No completion history exists for this topic.              Discontinued - COLORECTAL CANCER SCREENING  Discontinued        Frequency changed to Never automatically (Topic No Longer Applies)    02/17/2016  REFERRAL TO GI FOR COLONOSCOPY              Discontinued - IMM HEP B VACCINE  Discontinued      No completion history exists for this topic.              Discontinued - IMM ZOSTER VACCINES  Discontinued      No completion history exists for this topic.              Discontinued - COVID-19 Vaccine  Discontinued      No completion history exists for this topic.              Discontinued - HEPATITIS C SCREENING  Discontinued      No completion history exists for this topic.                    Patient  "Care Team:  Enedelia Bourne M.D. as PCP - General (Family Medicine)  Rupali Dill R.N.      Social History     Tobacco Use    Smoking status: Never    Smokeless tobacco: Never   Substance Use Topics    Alcohol use: No    Drug use: No     Family History   Problem Relation Age of Onset    Heart Disease Mother     Cancer Mother     Heart Disease Father     Cancer Father     Heart Disease Sister     Heart Attack Sister     Heart Disease Brother     Heart Attack Brother     Heart Disease Brother     Heart Attack Brother     Diabetes Child     Psychiatric Illness Child         hodgkins survivor     She  has a past medical history of Autoimmune hepatitis (HCC), B12 deficiency, Congenital malrotation of intestine, Fatigue (5/22/2013), Other chest pain (4/19/2013), Paroxysmal supraventricular tachycardia, details unknow - dx 3-5 y ago (4/19/2013), and Thyroid disease.   Past Surgical History:   Procedure Laterality Date    LUMPECTOMY  1982    right breast    ABDOMINAL HYSTERECTOMY TOTAL  1970    HERNIA REPAIR  1970    umbilical    TONSILLECTOMY  1954    APPENDECTOMY  1950    OTHER ABDOMINAL SURGERY  1965-66    congenital malrotation of intestines    PRIMARY C SECTION  1968/1970       ROS:    All positives noted in HPI. All others reviewed and are negative.    Ostomy or other tubes or amputations: no  Chronic oxygen use: no  Last eye exam: patient is aware of need to update exam  : denies urinary incontinence; does not interfere with ADLs/ sleep  Gait: stable  Problems with balance/ difficulty walking: no  Hearing: good  Dentition: adequate    Lab results 6/2/23 reviewed with patient at visit today.     Exam:   /62 (BP Location: Left arm, Patient Position: Sitting, BP Cuff Size: Adult)   Pulse 68   Temp 36.8 °C (98.3 °F) (Temporal)   Resp 17   Ht 1.6 m (5' 3\")   Wt 54.4 kg (120 lb)   SpO2 97%  Body mass index is 21.26 kg/m².    Gen: Well developed; well nourished; no acute distress; age appropriate " appearance   HEENT: Normocephalic; atraumatic; PEERLA b/l; sclera clear b/l; b/l external auditory canals WNL; b/l TM WNL; nares patent; oropharynx clear; oral mucosa moist; tongue midline; dentition adequate   Neck: No adenopathy; no thyromegaly  CV: Regular rate and rhythm; S1/ S2 present; no murmur, gallop or rub noted  Pulm: No respiratory distress; clear to ascultation b/l; no wheezing or stridor noted b/l  Abd: Adequate bowel sounds noted; soft and nontender; no rebound, rigidity, nor distention  Extremities: No peripheral edema b/l LE extremities/ no clubbing nor cyanosis noted  Skin: Warm and dry; no rashes noted   Neuro: No focal deficits noted; pt is able to get up out of chair unassisted and walk forward  Psych: AAOx4; mood and affect are appropriate    Assessment and Plan:  1. Acquired hypothyroidism  Patient feels well despite lab results - I am ok with patient continuing current daily Synthroid and will follow. New RX sent to pharmacy.   - SYNTHROID 125 MCG Tab; Take 1 Tablet by mouth every morning on an empty stomach.  Dispense: 90 Tablet; Refill: 3  - Subsequent Annual Wellness Visit - Includes PPPS ()    2. Seasonal allergic rhinitis due to pollen  Stable/ recommend patient continue current Singulair use and new RX sent to pharmacy.   - montelukast (SINGULAIR) 10 MG Tab; Take 1 Tablet by mouth every day.  Dispense: 90 Tablet; Refill: 3  - Subsequent Annual Wellness Visit - Includes PPPS ()    3. Postmenopausal  Refer to #4  - Subsequent Annual Wellness Visit - Includes PPPS ()    4. History of postmenopausal HRT  Stable/ patient is well versed about risks versus benefits of ongoing hormone use, and she reports ongoing Estrace use benefits. New RX sent to pharmacy.   - estradiol (ESTRACE) 1 MG Tab; Take 1 Tablet by mouth every day.  Dispense: 90 Tablet; Refill: 3  - Subsequent Annual Wellness Visit - Includes PPPS ()    5. Generalized OA  Stable/ patient can continue current  Voltaren gel use, and new RX sent to pharmacy.   - diclofenac sodium (VOLTAREN) 1 % Gel; Apply 4 gram to affected area twice daily.  Dispense: 800 g; Refill: 3  - Subsequent Annual Wellness Visit - Includes PPPS ()    6. Autoimmune hepatitis (CMS-HCC)  Stable/ condition managed by Dr. Moy.   - Subsequent Annual Wellness Visit - Includes PPPS ()    7. Osteopenia of multiple sites  Stable/ recommend patient continue current Vitamin D and calcium intake as well as regular weight bearing exercise.   - Subsequent Annual Wellness Visit - Includes PPPS ()    8. Essential hypertension  Stable/ condition managed by [facility].   - Subsequent Annual Wellness Visit - Includes PPPS ()    9. Elevated hemoglobin A1c  Resolved  - Subsequent Annual Wellness Visit - Includes PPPS ()    10. Breast cancer screening by mammogram  Patient will be due for annual screening mammogram in July for ongoing surveillance.   - MA-SCREENING MAMMO BILAT W/TOMOSYNTHESIS W/CAD; Future  - Subsequent Annual Wellness Visit - Includes PPPS ()    11. Encounter for Medicare annual wellness exam  Patient remains well informed about chronic medical conditions that need additional attention moving forward.   - Subsequent Annual Wellness Visit - Includes PPPS ()       Services needed: no new services needed at this time  Health Care Screening: recommendations as per orders if indicated.  Referrals offered: none  Counseling provided today:  Prevent falls and reduce trip hazards; Secure or remove rugs if present   Maintain working fire alarm and carbon monoxide detectors   Engage in regular physical activity daily and social activities weekly as tolerated

## 2023-07-28 ENCOUNTER — APPOINTMENT (OUTPATIENT)
Dept: RADIOLOGY | Facility: MEDICAL CENTER | Age: 78
End: 2023-07-28
Attending: FAMILY MEDICINE
Payer: COMMERCIAL

## 2023-07-28 DIAGNOSIS — Z12.31 BREAST CANCER SCREENING BY MAMMOGRAM: ICD-10-CM

## 2023-07-28 PROCEDURE — 77063 BREAST TOMOSYNTHESIS BI: CPT

## 2023-10-19 ENCOUNTER — HOSPITAL ENCOUNTER (OUTPATIENT)
Facility: MEDICAL CENTER | Age: 78
End: 2023-10-19
Attending: INTERNAL MEDICINE
Payer: COMMERCIAL

## 2023-10-19 ENCOUNTER — NON-PROVIDER VISIT (OUTPATIENT)
Dept: INTERNAL MEDICINE | Facility: IMAGING CENTER | Age: 78
End: 2023-10-19
Payer: COMMERCIAL

## 2023-10-19 DIAGNOSIS — Z23 NEED FOR VACCINATION: ICD-10-CM

## 2023-10-19 PROCEDURE — G0008 ADMIN INFLUENZA VIRUS VAC: HCPCS | Performed by: FAMILY MEDICINE

## 2023-10-19 PROCEDURE — 90662 IIV NO PRSV INCREASED AG IM: CPT | Performed by: FAMILY MEDICINE

## 2023-11-24 ENCOUNTER — HOSPITAL ENCOUNTER (OUTPATIENT)
Dept: RADIOLOGY | Facility: MEDICAL CENTER | Age: 78
End: 2023-11-24
Attending: INTERNAL MEDICINE
Payer: COMMERCIAL

## 2023-11-24 DIAGNOSIS — K75.4 AUTOIMMUNE HEPATITIS (HCC): ICD-10-CM

## 2023-11-24 DIAGNOSIS — K74.60 HEPATIC CIRRHOSIS, UNSPECIFIED HEPATIC CIRRHOSIS TYPE, UNSPECIFIED WHETHER ASCITES PRESENT (HCC): ICD-10-CM

## 2023-11-24 PROCEDURE — 76705 ECHO EXAM OF ABDOMEN: CPT

## 2023-12-19 DIAGNOSIS — I47.10 PAROXYSMAL SUPRAVENTRICULAR TACHYCARDIA (HCC): ICD-10-CM

## 2023-12-19 DIAGNOSIS — I10 ESSENTIAL HYPERTENSION: ICD-10-CM

## 2024-01-02 RX ORDER — LOSARTAN POTASSIUM 25 MG/1
25 TABLET ORAL DAILY
Qty: 100 TABLET | Refills: 3 | Status: SHIPPED | OUTPATIENT
Start: 2024-01-02

## 2024-01-03 ENCOUNTER — PATIENT MESSAGE (OUTPATIENT)
Dept: CARDIOLOGY | Facility: MEDICAL CENTER | Age: 79
End: 2024-01-03
Payer: COMMERCIAL

## 2024-01-25 ENCOUNTER — OFFICE VISIT (OUTPATIENT)
Dept: CARDIOLOGY | Facility: MEDICAL CENTER | Age: 79
End: 2024-01-25
Attending: INTERNAL MEDICINE
Payer: COMMERCIAL

## 2024-01-25 VITALS
OXYGEN SATURATION: 90 % | WEIGHT: 125 LBS | HEART RATE: 76 BPM | BODY MASS INDEX: 22.15 KG/M2 | RESPIRATION RATE: 16 BRPM | HEIGHT: 63 IN | SYSTOLIC BLOOD PRESSURE: 116 MMHG | DIASTOLIC BLOOD PRESSURE: 54 MMHG

## 2024-01-25 DIAGNOSIS — I34.0 NONRHEUMATIC MITRAL VALVE REGURGITATION: ICD-10-CM

## 2024-01-25 DIAGNOSIS — I10 ESSENTIAL HYPERTENSION: ICD-10-CM

## 2024-01-25 DIAGNOSIS — I47.10 PAROXYSMAL SUPRAVENTRICULAR TACHYCARDIA (HCC): ICD-10-CM

## 2024-01-25 PROCEDURE — 3074F SYST BP LT 130 MM HG: CPT | Performed by: INTERNAL MEDICINE

## 2024-01-25 PROCEDURE — 3078F DIAST BP <80 MM HG: CPT | Performed by: INTERNAL MEDICINE

## 2024-01-25 PROCEDURE — 99213 OFFICE O/P EST LOW 20 MIN: CPT | Performed by: INTERNAL MEDICINE

## 2024-01-25 ASSESSMENT — FIBROSIS 4 INDEX: FIB4 SCORE: 2.21

## 2024-01-25 NOTE — PROGRESS NOTES
Chief Complaint   Patient presents with    Supraventricular Tachycardia (SVT)     Follow up        Subjective     She Davis is a 78 y.o. female who presents today for follow-up of PSVT well-controlled on CCB for many years.  She has some mild to moderate mitral insufficiency as well which has been stable.    Doing well since last visit with unchanged exertional symptoms going uphill present for many years.  Repeat echocardiogram shows improved of her mitral regurg.    Past Medical History:   Diagnosis Date    Autoimmune hepatitis (HCC)     B12 deficiency     Congenital malrotation of intestine     Fatigue 5/22/2013    Other chest pain 4/19/2013    Paroxysmal supraventricular tachycardia, details unknow - dx 3-5 y ago 4/19/2013    Thyroid disease      Past Surgical History:   Procedure Laterality Date    LUMPECTOMY  1982    right breast    ABDOMINAL HYSTERECTOMY TOTAL  1970    HERNIA REPAIR  1970    umbilical    TONSILLECTOMY  1954    APPENDECTOMY  1950    OTHER ABDOMINAL SURGERY  1965-66    congenital malrotation of intestines    PRIMARY C SECTION  1968/1970     Family History   Problem Relation Age of Onset    Heart Disease Mother     Cancer Mother     Heart Disease Father     Cancer Father     Heart Disease Sister     Heart Attack Sister     Heart Disease Brother     Heart Attack Brother     Heart Disease Brother     Heart Attack Brother     Diabetes Child     Psychiatric Illness Child         hodgkins survivor     Social History     Socioeconomic History    Marital status:      Spouse name: Not on file    Number of children: Not on file    Years of education: Not on file    Highest education level: Not on file   Occupational History    Not on file   Tobacco Use    Smoking status: Never    Smokeless tobacco: Never   Substance and Sexual Activity    Alcohol use: No    Drug use: No    Sexual activity: Not Currently     Partners: Male   Other Topics Concern    Not on file   Social History Narrative     Not on file     Social Determinants of Health     Financial Resource Strain: Not on file   Food Insecurity: Not on file   Transportation Needs: Not on file   Physical Activity: Not on file   Stress: Not on file   Social Connections: Not on file   Intimate Partner Violence: Not on file   Housing Stability: Not on file     Allergies   Allergen Reactions    Statins [Hmg-Coa-R Inhibitors]      Autoimmune hepatitis    Chlordiazepoxide Hcl      Causes hyperactivity    Codeine     Hydrocodone     Librium     Lopressor [Kdc:Ci Pigment Blue 63+Metoprolol]      Does not tolerate beta blockers    Sulphadimidine Sodium [Sulfamethazine Sodium]      Outpatient Encounter Medications as of 1/25/2024   Medication Sig Dispense Refill    losartan (COZAAR) 25 MG Tab Take 1 Tablet by mouth every day. 100 Tablet 3    estradiol (ESTRACE) 1 MG Tab Take 1 Tablet by mouth every day. 90 Tablet 3    SYNTHROID 125 MCG Tab Take 1 Tablet by mouth every morning on an empty stomach. 90 Tablet 3    montelukast (SINGULAIR) 10 MG Tab Take 1 Tablet by mouth every day. 90 Tablet 3    diclofenac sodium (VOLTAREN) 1 % Gel Apply 4 gram to affected area twice daily. 800 g 3    DILTIAZem CD (CARTIA XT) 120 MG CAPSULE SR 24 HR Take 1 Capsule by mouth every day. 100 Capsule 3    azaTHIOprine (IMURAN) 50 MG Tab 50 mg and 75 mg every other day alternating      Cholecalciferol (VITAMIN D3 PO) Take 2,000 mg by mouth.      CALCIUM CARB-CHOLECALCIFEROL PO Take 1 Dose by mouth every day. Calcium 1000 mg/ Vitamin D 2000 IU daily      Azathioprine 75 MG Tab Take 1 Tab by mouth every day. (Patient taking differently: Take 75 mg by mouth every day. 50 mg and 75 mg alternating dosages) 90 Tab 4    aspirin EC (ECOTRIN) 81 MG Tablet Delayed Response Take 1 Tab by mouth every day. 90 Tab 4    fexofenadine (ALLEGRA) 180 MG tablet Take 180 mg by mouth 1 time daily as needed. Indications: Hayfever      docosahexanoic acid (OMEGA 3 FA) 1000 MG CAPS Take 1,000 mg by mouth every  "day.       No facility-administered encounter medications on file as of 1/25/2024.     Review of Systems   All other systems reviewed and are negative.             Objective     /54 (BP Location: Left arm, Patient Position: Sitting)   Pulse 76   Resp 16   Ht 1.6 m (5' 3\")   Wt 56.7 kg (125 lb)   SpO2 90%   BMI 22.14 kg/m²     Physical Exam  Vitals reviewed.   Constitutional:       General: She is not in acute distress.     Appearance: She is well-developed. She is not diaphoretic.   HENT:      Head: Normocephalic and atraumatic.      Right Ear: External ear normal.      Left Ear: External ear normal.      Mouth/Throat:      Pharynx: No oropharyngeal exudate.   Eyes:      General: No scleral icterus.        Right eye: No discharge.         Left eye: No discharge.      Conjunctiva/sclera: Conjunctivae normal.      Pupils: Pupils are equal, round, and reactive to light.   Neck:      Thyroid: No thyromegaly.      Vascular: No JVD.      Trachea: No tracheal deviation.   Cardiovascular:      Rate and Rhythm: Normal rate and regular rhythm.      Chest Wall: PMI is not displaced.      Pulses:           Carotid pulses are 2+ on the right side and 2+ on the left side.       Radial pulses are 2+ on the right side and 2+ on the left side.        Popliteal pulses are 2+ on the right side and 2+ on the left side.        Dorsalis pedis pulses are 2+ on the right side and 2+ on the left side.        Posterior tibial pulses are 2+ on the right side and 2+ on the left side.      Heart sounds: Normal heart sounds, S1 normal and S2 normal. No murmur heard.     No friction rub. No gallop. No S3 or S4 sounds.   Pulmonary:      Effort: Pulmonary effort is normal. No respiratory distress.      Breath sounds: Normal breath sounds. No wheezing or rales.   Chest:      Chest wall: No tenderness.   Abdominal:      General: Bowel sounds are normal. There is no distension.      Palpations: Abdomen is soft.      Tenderness: There is no " abdominal tenderness.   Musculoskeletal:         General: No tenderness. Normal range of motion.      Cervical back: Normal range of motion and neck supple.   Skin:     General: Skin is warm and dry.      Findings: No erythema or rash.   Neurological:      Mental Status: She is alert and oriented to person, place, and time.      Cranial Nerves: No cranial nerve deficit (Cranial nerves II through XII grossly intact).   Psychiatric:         Behavior: Behavior normal.         Thought Content: Thought content normal.         Judgment: Judgment normal.       LABS:  Lab Results   Component Value Date/Time    CHOLSTRLTOT 216 (H) 06/02/2023 06:15 AM    CHOLSTRLTOT 220 (H) 04/22/2021 09:30 AM     (H) 04/22/2021 09:30 AM    HDL 81 06/02/2023 06:15 AM    HDL 88 04/22/2021 09:30 AM    TRIGLYCERIDE 74 06/02/2023 06:15 AM    TRIGLYCERIDE 70 04/22/2021 09:30 AM       Lab Results   Component Value Date/Time    WBC 3.9 06/02/2023 06:15 AM    WBC 5.0 11/02/2021 10:45 AM    RBC 4.47 06/02/2023 06:15 AM    RBC 4.64 11/02/2021 10:45 AM    HEMOGLOBIN 14.9 06/02/2023 06:15 AM    HEMOGLOBIN 15.5 11/02/2021 10:45 AM    HEMATOCRIT 43.8 06/02/2023 06:15 AM    HEMATOCRIT 46.6 11/02/2021 10:45 AM    MCV 98 (H) 06/02/2023 06:15 AM    .4 (H) 11/02/2021 10:45 AM    NEUTSPOLYS 59 06/02/2023 06:15 AM    NEUTSPOLYS 66.10 11/02/2021 10:45 AM    LYMPHOCYTES 19 06/02/2023 06:15 AM    LYMPHOCYTES 14.70 (L) 11/02/2021 10:45 AM    MONOCYTES 14 06/02/2023 06:15 AM    MONOCYTES 11.90 11/02/2021 10:45 AM    EOSINOPHILS 6 06/02/2023 06:15 AM    EOSINOPHILS 6.10 11/02/2021 10:45 AM    BASOPHILS 2 06/02/2023 06:15 AM    BASOPHILS 0.80 11/02/2021 10:45 AM     Lab Results   Component Value Date/Time    SODIUM 139 06/02/2023 06:15 AM    SODIUM 141 11/02/2021 10:45 AM    POTASSIUM 4.0 06/02/2023 06:15 AM    POTASSIUM 3.9 11/02/2021 10:45 AM    CHLORIDE 103 06/02/2023 06:15 AM    CHLORIDE 106 11/02/2021 10:45 AM    CO2 22 06/02/2023 06:15 AM    CO2 26  "11/02/2021 10:45 AM    GLUCOSE 78 06/02/2023 06:15 AM    GLUCOSE 80 11/02/2021 10:45 AM    BUN 16 06/02/2023 06:15 AM    BUN 16 11/02/2021 10:45 AM    CREATININE 0.91 06/02/2023 06:15 AM    CREATININE 0.85 11/02/2021 10:45 AM    BUNCREATRAT 18 06/02/2023 06:15 AM     Lab Results   Component Value Date    HBA1C 5.6 06/02/2023      Lab Results   Component Value Date/Time    ALKPHOSPHAT 60 06/02/2023 06:15 AM    ALKPHOSPHAT 57 11/02/2021 10:45 AM    ASTSGOT 25 06/02/2023 06:15 AM    ASTSGOT 22 11/02/2021 10:45 AM    ALTSGPT 20 06/02/2023 06:15 AM    ALTSGPT 19 11/02/2021 10:45 AM    TBILIRUBIN 0.6 06/02/2023 06:15 AM    TBILIRUBIN 0.4 11/02/2021 10:45 AM      No results found for: \"BNPBTYPENAT\"   Lab Results   Component Value Date/Time    TSH 0.923 06/02/2023 06:15 AM     Lab Results   Component Value Date/Time    PROTHROMBTM 12.8 04/26/2022 09:20 AM    INR 0.99 04/26/2022 09:20 AM                 Assessment & Plan     1. Paroxysmal supraventricular tachycardia        2. Essential hypertension        3. Nonrheumatic mitral valve regurgitation            Medical Decision Making: Today's Assessment/Status/Plan:          Doing well occasional palpitations that are not particular bothersome for her.  Mitral regurgitation has improved.  Diet and lifestyle discussions.  Follow-up yearly.  Echocardiogram in 2 to 3 years for follow-up.  "

## 2024-03-26 ENCOUNTER — PATIENT MESSAGE (OUTPATIENT)
Dept: CARDIOLOGY | Facility: MEDICAL CENTER | Age: 79
End: 2024-03-26
Payer: COMMERCIAL

## 2024-03-26 DIAGNOSIS — I47.10 PAROXYSMAL SUPRAVENTRICULAR TACHYCARDIA (HCC): ICD-10-CM

## 2024-03-26 DIAGNOSIS — I10 ESSENTIAL HYPERTENSION: ICD-10-CM

## 2024-03-26 RX ORDER — DILTIAZEM HYDROCHLORIDE 120 MG/1
120 CAPSULE, COATED, EXTENDED RELEASE ORAL DAILY
Qty: 100 CAPSULE | Refills: 2 | Status: SHIPPED | OUTPATIENT
Start: 2024-03-26

## 2024-04-18 ENCOUNTER — NON-PROVIDER VISIT (OUTPATIENT)
Dept: INTERNAL MEDICINE | Facility: IMAGING CENTER | Age: 79
End: 2024-04-18
Payer: COMMERCIAL

## 2024-04-18 ENCOUNTER — HOSPITAL ENCOUNTER (OUTPATIENT)
Facility: MEDICAL CENTER | Age: 79
End: 2024-04-18
Attending: INTERNAL MEDICINE
Payer: COMMERCIAL

## 2024-04-18 LAB
FORWARD REASON: SPWHY: NORMAL
FORWARDED TO LAB: SPWHR: NORMAL
SPECIMEN SENT (2ND): SPWT2: NORMAL
SPECIMEN SENT (3RD): SPWT3: NORMAL
SPECIMEN SENT (4TH): SPWT4: NORMAL
SPECIMEN SENT: SPWT1: NORMAL

## 2024-04-18 PROCEDURE — 99999 PR NO CHARGE: CPT

## 2024-05-17 ENCOUNTER — APPOINTMENT (OUTPATIENT)
Dept: RADIOLOGY | Facility: MEDICAL CENTER | Age: 79
End: 2024-05-17
Attending: INTERNAL MEDICINE
Payer: COMMERCIAL

## 2024-05-17 DIAGNOSIS — I10 ESSENTIAL HYPERTENSION, MALIGNANT: ICD-10-CM

## 2024-05-17 DIAGNOSIS — K74.60 HEPATIC CIRRHOSIS, UNSPECIFIED HEPATIC CIRRHOSIS TYPE, UNSPECIFIED WHETHER ASCITES PRESENT (HCC): ICD-10-CM

## 2024-05-17 DIAGNOSIS — K75.4 CHRONIC AGGRESSIVE HEPATITIS (HCC): ICD-10-CM

## 2024-06-06 DIAGNOSIS — R73.9 HYPERGLYCEMIA: ICD-10-CM

## 2024-06-06 DIAGNOSIS — E55.9 VITAMIN D DEFICIENCY: ICD-10-CM

## 2024-06-06 DIAGNOSIS — E03.9 ACQUIRED HYPOTHYROIDISM: Chronic | ICD-10-CM

## 2024-06-06 DIAGNOSIS — I10 ESSENTIAL HYPERTENSION: ICD-10-CM

## 2024-06-06 DIAGNOSIS — K75.4 AUTOIMMUNE HEPATITIS (HCC): ICD-10-CM

## 2024-06-06 DIAGNOSIS — E78.2 MIXED DYSLIPIDEMIA: ICD-10-CM

## 2024-06-06 DIAGNOSIS — D75.89 MACROCYTOSIS: ICD-10-CM

## 2024-06-10 ENCOUNTER — NON-PROVIDER VISIT (OUTPATIENT)
Dept: INTERNAL MEDICINE | Facility: IMAGING CENTER | Age: 79
End: 2024-06-10
Payer: COMMERCIAL

## 2024-06-10 ENCOUNTER — HOSPITAL ENCOUNTER (OUTPATIENT)
Facility: MEDICAL CENTER | Age: 79
End: 2024-06-10
Attending: FAMILY MEDICINE
Payer: COMMERCIAL

## 2024-06-10 DIAGNOSIS — E03.9 ACQUIRED HYPOTHYROIDISM: Chronic | ICD-10-CM

## 2024-06-10 DIAGNOSIS — E55.9 VITAMIN D DEFICIENCY: ICD-10-CM

## 2024-06-10 DIAGNOSIS — K75.4 AUTOIMMUNE HEPATITIS (HCC): ICD-10-CM

## 2024-06-10 DIAGNOSIS — D75.89 MACROCYTOSIS: ICD-10-CM

## 2024-06-10 DIAGNOSIS — I10 ESSENTIAL HYPERTENSION: ICD-10-CM

## 2024-06-10 DIAGNOSIS — E78.2 MIXED DYSLIPIDEMIA: ICD-10-CM

## 2024-06-10 DIAGNOSIS — R73.9 HYPERGLYCEMIA: ICD-10-CM

## 2024-06-10 LAB
BASOPHILS # BLD AUTO: 1.5 % (ref 0–1.8)
BASOPHILS # BLD: 0.07 K/UL (ref 0–0.12)
EOSINOPHIL # BLD AUTO: 0.21 K/UL (ref 0–0.51)
EOSINOPHIL NFR BLD: 4.4 % (ref 0–6.9)
ERYTHROCYTE [DISTWIDTH] IN BLOOD BY AUTOMATED COUNT: 42.4 FL (ref 35.9–50)
HCT VFR BLD AUTO: 44.2 % (ref 37–47)
HGB BLD-MCNC: 15 G/DL (ref 12–16)
IMM GRANULOCYTES # BLD AUTO: 0 K/UL (ref 0–0.11)
IMM GRANULOCYTES NFR BLD AUTO: 0 % (ref 0–0.9)
LYMPHOCYTES # BLD AUTO: 0.67 K/UL (ref 1–4.8)
LYMPHOCYTES NFR BLD: 14.2 % (ref 22–41)
MCH RBC QN AUTO: 33.7 PG (ref 27–33)
MCHC RBC AUTO-ENTMCNC: 33.9 G/DL (ref 32.2–35.5)
MCV RBC AUTO: 99.3 FL (ref 81.4–97.8)
MONOCYTES # BLD AUTO: 0.58 K/UL (ref 0–0.85)
MONOCYTES NFR BLD AUTO: 12.3 % (ref 0–13.4)
NEUTROPHILS # BLD AUTO: 3.19 K/UL (ref 1.82–7.42)
NEUTROPHILS NFR BLD: 67.6 % (ref 44–72)
NRBC # BLD AUTO: 0 K/UL
NRBC BLD-RTO: 0 /100 WBC (ref 0–0.2)
PLATELET # BLD AUTO: 187 K/UL (ref 164–446)
PMV BLD AUTO: 11.6 FL (ref 9–12.9)
RBC # BLD AUTO: 4.45 M/UL (ref 4.2–5.4)
WBC # BLD AUTO: 4.7 K/UL (ref 4.8–10.8)

## 2024-06-10 PROCEDURE — 84443 ASSAY THYROID STIM HORMONE: CPT

## 2024-06-10 PROCEDURE — 84439 ASSAY OF FREE THYROXINE: CPT

## 2024-06-10 PROCEDURE — 80053 COMPREHEN METABOLIC PANEL: CPT

## 2024-06-10 PROCEDURE — 82746 ASSAY OF FOLIC ACID SERUM: CPT

## 2024-06-10 PROCEDURE — 82306 VITAMIN D 25 HYDROXY: CPT

## 2024-06-10 PROCEDURE — 82105 ALPHA-FETOPROTEIN SERUM: CPT

## 2024-06-10 PROCEDURE — 80061 LIPID PANEL: CPT

## 2024-06-10 PROCEDURE — 83036 HEMOGLOBIN GLYCOSYLATED A1C: CPT

## 2024-06-10 PROCEDURE — 85025 COMPLETE CBC W/AUTO DIFF WBC: CPT

## 2024-06-10 PROCEDURE — 82607 VITAMIN B-12: CPT

## 2024-06-10 PROCEDURE — 99999 PR NO CHARGE: CPT

## 2024-06-11 LAB
25(OH)D3 SERPL-MCNC: 58 NG/ML (ref 30–100)
ALBUMIN SERPL BCP-MCNC: 4 G/DL (ref 3.2–4.9)
ALBUMIN/GLOB SERPL: 1.3 G/DL
ALP SERPL-CCNC: 69 U/L (ref 30–99)
ALT SERPL-CCNC: 15 U/L (ref 2–50)
ANION GAP SERPL CALC-SCNC: 12 MMOL/L (ref 7–16)
AST SERPL-CCNC: 28 U/L (ref 12–45)
BILIRUB SERPL-MCNC: 0.7 MG/DL (ref 0.1–1.5)
BUN SERPL-MCNC: 14 MG/DL (ref 8–22)
CALCIUM ALBUM COR SERPL-MCNC: 9.4 MG/DL (ref 8.5–10.5)
CALCIUM SERPL-MCNC: 9.4 MG/DL (ref 8.5–10.5)
CHLORIDE SERPL-SCNC: 105 MMOL/L (ref 96–112)
CHOLEST SERPL-MCNC: 209 MG/DL (ref 100–199)
CO2 SERPL-SCNC: 24 MMOL/L (ref 20–33)
CREAT SERPL-MCNC: 0.77 MG/DL (ref 0.5–1.4)
EST. AVERAGE GLUCOSE BLD GHB EST-MCNC: 117 MG/DL
FOLATE SERPL-MCNC: 9.8 NG/ML
GFR SERPLBLD CREATININE-BSD FMLA CKD-EPI: 79 ML/MIN/1.73 M 2
GLOBULIN SER CALC-MCNC: 3.1 G/DL (ref 1.9–3.5)
GLUCOSE SERPL-MCNC: 86 MG/DL (ref 65–99)
HBA1C MFR BLD: 5.7 % (ref 4–5.6)
HDLC SERPL-MCNC: 81 MG/DL
LDLC SERPL CALC-MCNC: 116 MG/DL
POTASSIUM SERPL-SCNC: 4 MMOL/L (ref 3.6–5.5)
PROT SERPL-MCNC: 7.1 G/DL (ref 6–8.2)
SODIUM SERPL-SCNC: 141 MMOL/L (ref 135–145)
T4 FREE SERPL-MCNC: 1.85 NG/DL (ref 0.93–1.7)
TRIGL SERPL-MCNC: 60 MG/DL (ref 0–149)
TSH SERPL DL<=0.005 MIU/L-ACNC: 0.34 UIU/ML (ref 0.38–5.33)
VIT B12 SERPL-MCNC: 2576 PG/ML (ref 211–911)

## 2024-06-12 LAB — AFP-TM SERPL-MCNC: 4 NG/ML (ref 0–9)

## 2024-06-19 ENCOUNTER — OFFICE VISIT (OUTPATIENT)
Dept: INTERNAL MEDICINE | Facility: IMAGING CENTER | Age: 79
End: 2024-06-19
Payer: COMMERCIAL

## 2024-06-19 VITALS
TEMPERATURE: 97.9 F | SYSTOLIC BLOOD PRESSURE: 118 MMHG | HEIGHT: 63 IN | OXYGEN SATURATION: 98 % | DIASTOLIC BLOOD PRESSURE: 62 MMHG | BODY MASS INDEX: 20.91 KG/M2 | WEIGHT: 118 LBS | RESPIRATION RATE: 17 BRPM | HEART RATE: 78 BPM

## 2024-06-19 DIAGNOSIS — R73.09 ELEVATED HEMOGLOBIN A1C: ICD-10-CM

## 2024-06-19 DIAGNOSIS — I10 ESSENTIAL HYPERTENSION: ICD-10-CM

## 2024-06-19 DIAGNOSIS — M15.9 GENERALIZED OA: ICD-10-CM

## 2024-06-19 DIAGNOSIS — I34.0 MITRAL VALVE INSUFFICIENCY, UNSPECIFIED ETIOLOGY: Chronic | ICD-10-CM

## 2024-06-19 DIAGNOSIS — Z12.31 BREAST CANCER SCREENING BY MAMMOGRAM: ICD-10-CM

## 2024-06-19 DIAGNOSIS — Z92.29 HISTORY OF POSTMENOPAUSAL HRT: ICD-10-CM

## 2024-06-19 DIAGNOSIS — Z00.00 ENCOUNTER FOR MEDICARE ANNUAL WELLNESS EXAM: ICD-10-CM

## 2024-06-19 DIAGNOSIS — E03.9 ACQUIRED HYPOTHYROIDISM: Chronic | ICD-10-CM

## 2024-06-19 DIAGNOSIS — K75.4 AUTOIMMUNE HEPATITIS (HCC): Chronic | ICD-10-CM

## 2024-06-19 DIAGNOSIS — J30.1 SEASONAL ALLERGIC RHINITIS DUE TO POLLEN: ICD-10-CM

## 2024-06-19 DIAGNOSIS — M85.89 OSTEOPENIA OF MULTIPLE SITES: Chronic | ICD-10-CM

## 2024-06-19 DIAGNOSIS — E67.8 EXCESSIVE VITAMIN B12 INTAKE: ICD-10-CM

## 2024-06-19 PROCEDURE — 3074F SYST BP LT 130 MM HG: CPT | Performed by: FAMILY MEDICINE

## 2024-06-19 PROCEDURE — G0439 PPPS, SUBSEQ VISIT: HCPCS | Performed by: FAMILY MEDICINE

## 2024-06-19 PROCEDURE — 3078F DIAST BP <80 MM HG: CPT | Performed by: FAMILY MEDICINE

## 2024-06-19 RX ORDER — MONTELUKAST SODIUM 10 MG/1
10 TABLET ORAL DAILY
Qty: 90 TABLET | Refills: 3 | Status: SHIPPED | OUTPATIENT
Start: 2024-06-19

## 2024-06-19 RX ORDER — LEVOTHYROXINE SODIUM 125 MCG
125 TABLET ORAL
Qty: 90 TABLET | Refills: 3 | Status: SHIPPED | OUTPATIENT
Start: 2024-06-19

## 2024-06-19 RX ORDER — ESTRADIOL 1 MG/1
1 TABLET ORAL DAILY
Qty: 90 TABLET | Refills: 3 | Status: SHIPPED | OUTPATIENT
Start: 2024-06-19

## 2024-06-19 ASSESSMENT — FIBROSIS 4 INDEX: FIB4 SCORE: 2.98

## 2024-06-19 ASSESSMENT — PATIENT HEALTH QUESTIONNAIRE - PHQ9: CLINICAL INTERPRETATION OF PHQ2 SCORE: 0

## 2024-06-19 ASSESSMENT — ACTIVITIES OF DAILY LIVING (ADL): BATHING_REQUIRES_ASSISTANCE: 0

## 2024-06-19 ASSESSMENT — ENCOUNTER SYMPTOMS: GENERAL WELL-BEING: GOOD

## 2024-06-19 NOTE — PROGRESS NOTES
CC:   Medicare Annual Wellness Visit    HPI:  Angela is a 78 y.o. female here for her Medicare Annual Wellness Visit and to review labs done 6/10/24. Her health has been stable overall, and she remains busy with her expansive yard work currently.  She suffers from diffuse OA and uses Diclofenac gel for pain relief. She has failed Salon Pas patches and does not tolerate oral medications well. She has chronic acquired hypothyroidism with daily Synthroid use, and chronic essential HTN/history of PSVT well controlled with daily Losartan and Diltiazam use managed by [facility]. She continues to take Vitamin D3 and calcium for history of osteopenia, and she has history of autoimmune hepatitis controlled with Azathioprine managed by Dr. Moy. She will be due for annual screening mammogram in late July and is otherwise UTD with HCM items. She denies new mental health concerns and is in a very happy mood today.     Patient Active Problem List    Diagnosis Date Noted    Generalized OA 04/12/2019    Elevated hemoglobin A1c 09/26/2018    Essential hypertension 02/12/2018    Osteopenia of multiple sites 05/17/2017    Acquired hypothyroidism 05/17/2017    Autoimmune hepatitis (CMS-HCC) 10/10/2014    Mitral regurgitation 10/10/2014    Paroxysmal supraventricular tachycardia, details unknow - dx 3-5 y ago 04/19/2013     Current Outpatient Medications   Medication Sig Dispense Refill    SYNTHROID 125 MCG Tab Take 1 Tablet by mouth every morning on an empty stomach. 90 Tablet 3    montelukast (SINGULAIR) 10 MG Tab Take 1 Tablet by mouth every day. 90 Tablet 3    diclofenac sodium (VOLTAREN) 1 % Gel Apply 4 gram to affected area twice daily. 800 g 3    estradiol (ESTRACE) 1 MG Tab Take 1 Tablet by mouth every day. 90 Tablet 3    DILTIAZem CD (CARTIA XT) 120 MG CAPSULE SR 24 HR Take 1 Capsule by mouth every day. 100 Capsule 2    losartan (COZAAR) 25 MG Tab Take 1 Tablet by mouth every day. 100 Tablet 3    azaTHIOprine  (IMURAN) 50 MG Tab 50 mg and 75 mg every other day alternating      Cholecalciferol (VITAMIN D3 PO) Take 2,000 mg by mouth.      CALCIUM CARB-CHOLECALCIFEROL PO Take 1 Dose by mouth every day. Calcium 1000 mg/ Vitamin D 2000 IU daily      Azathioprine 75 MG Tab Take 1 Tab by mouth every day. (Patient taking differently: Take 75 mg by mouth every day. 50 mg and 75 mg alternating dosages) 90 Tab 4    aspirin EC (ECOTRIN) 81 MG Tablet Delayed Response Take 1 Tab by mouth every day. 90 Tab 4    fexofenadine (ALLEGRA) 180 MG tablet Take 180 mg by mouth 1 time daily as needed. Indications: Hayfever      docosahexanoic acid (OMEGA 3 FA) 1000 MG CAPS Take 1,000 mg by mouth every day.       No current facility-administered medications for this visit.      Current supplements: see MAR (has discontinued B12 containing supplementation since labs done)  Chronic narcotic pain medicines: no  Allergies: Statins [hmg-coa-r inhibitors], Chlordiazepoxide hcl, Codeine, Hydrocodone, Librium, Lopressor [kdc:ci pigment blue 63+metoprolol], and Sulphadimidine sodium [sulfamethazine sodium]  Exercise: yes  Current social contact/activities: yes  Current mood: good  Advance Directive on file: yes    Screening:  Depression Screening  Little interest or pleasure in doing things?  0 - not at all  Feeling down, depressed , or hopeless? 0 - not at all  Patient Health Questionnaire Score: 0     If depressive symptoms identified deferred to follow up visit unless specifically addressed in assessment and plan.    Interpretation of PHQ-9 Total Score   Score Severity   1-4 No Depression   5-9 Mild Depression   10-14 Moderate Depression   15-19 Moderately Severe Depression   20-27 Severe Depression    Screening for Cognitive Impairment  Do you or any of your friends or family members have any concern about your memory? No  Three Minute Recall (Leader, Season, Table) 3/3    Priyank clock face with all 12 numbers and set the hands to show 10 minutes after  11.  Yes    Cognitive concerns identified deferred for follow up unless specifically addressed in assessment and plan.    Fall Risk Assessment  Has the patient had two or more falls in the last year or any fall with injury in the last year?  No    Safety Assessment  Do you always wear your seatbelt?  Yes  Any changes to home needed to function safely? No  Difficulty hearing.  No  Patient counseled about all safety risks that were identified.    Functional Assessment ADLs  Are there any barriers preventing you from cooking for yourself or meeting nutritional needs?  No.    Are there any barriers preventing you from driving safely or obtaining transportation?  No.    Are there any barriers preventing you from using a telephone or calling for help?  No    Are there any barriers preventing you from shopping?  No.    Are there any barriers preventing you from taking care of your own finances?  No    Are there any barriers preventing you from managing your medications?  No    Are there any barriers preventing you from showering, bathing or dressing yourself? No    Are there any barriers preventing you from doing housework or laundry? No  Are there any barriers preventing you from using the toilet?No  Are you currently engaging in any exercise or physical activity?  Yes.      Self-Assessment of Health  What is your perception of your health? Good  Do you sleep more than six hours a night? Yes  In the past 7 days, how much did pain keep you from doing your normal work? None  Do you spend quality time with family or friends (virtually or in person)? Yes  Do you usually eat a heart healthy diet that constists of a variety of fruits, vegetables, whole grains and fiber? Yes  Do you eat foods high in fat and/or Fast Food more than three times per week? No    Advance Care Planning  Do you have an Advance Directive, Living Will, Durable Power of , or POLST?                   Health Maintenance Summary            Ordered -  Mammogram (Yearly) Ordered on 6/19/2024 07/28/2023  MA-SCREENING MAMMO BILAT W/TOMOSYNTHESIS W/CAD    07/27/2022  MA-SCREENING MAMMO BILAT W/TOMOSYNTHESIS W/CAD    01/13/2020  MA-SCREENING MAMMO BILAT W/TOMOSYNTHESIS W/CAD    11/21/2018  MA-SCREENING MAMMO BILAT W/TOMOSYNTHESIS W/CAD    07/24/2017  [id number]    Only the first 5 history entries have been loaded, but more history exists.              Annual Wellness Visit (Yearly) Next due on 6/19/2025 06/19/2024  Visit Dx: Encounter for Medicare annual wellness exam    06/19/2024  Subsequent Annual Wellness Visit - Includes PPPS ()    06/11/2023  Subsequent Annual Wellness Visit - Includes PPPS ()    06/09/2023  Visit Dx: Encounter for Medicare annual wellness exam    05/05/2022  Subsequent Annual Wellness Visit - Includes PPPS ()    Only the first 5 history entries have been loaded, but more history exists.              Bone Density Scan (Every 5 Years) Next due on 7/27/2027 07/27/2022  DS-BONE DENSITY STUDY (DEXA)    09/14/2017  DS-BONE DENSITY STUDY (DEXA)    06/17/2005  DS-BONE DENSITY STUDY (DEXA)              IMM DTaP/Tdap/Td Vaccine (2 - Td or Tdap) Next due on 9/14/2027 09/14/2017  Imm Admin: Tdap Vaccine              Pneumococcal Vaccine: 65+ Years (Series Information) Completed      09/16/2015  Imm Admin: Pneumococcal Conjugate Vaccine (Prevnar/PCV-13)    05/01/2014  Imm Admin: Pneumococcal polysaccharide vaccine (PPSV-23)              Influenza Vaccine (Series Information) Completed      10/19/2023  Imm Admin: Influenza Vaccine Adult HD    09/29/2022  Imm Admin: Influenza, Unspecified - HISTORICAL DATA    11/02/2021  Imm Admin: Influenza Vaccine Adult HD    09/30/2020  Imm Admin: Influenza Vaccine Adult HD    10/10/2019  Imm Admin: Influenza Vac Subunit Quad Inj (Pf)    Only the first 5 history entries have been loaded, but more history exists.              HPV Vaccines (Series Information) Aged Out      No  completion history exists for this topic.              Polio Vaccine (Inactivated Polio) (Series Information) Aged Out      No completion history exists for this topic.              Meningococcal Immunization (Series Information) Aged Out      No completion history exists for this topic.              Discontinued - Colorectal Cancer Screening  Discontinued        Frequency changed to Never automatically (Topic No Longer Applies)    02/17/2016  REFERRAL TO GI FOR COLONOSCOPY              Discontinued - Hepatitis A Vaccine (Hep A)  Discontinued      No completion history exists for this topic.              Discontinued - Hepatitis B Vaccine (Hep B)  Discontinued      No completion history exists for this topic.              Discontinued - Zoster (Shingles) Vaccines  Discontinued      No completion history exists for this topic.              Discontinued - COVID-19 Vaccine  Discontinued      No completion history exists for this topic.              Discontinued - Hepatitis C Screening  Discontinued      No completion history exists for this topic.                    Patient Care Team:  Enedelia Bourne M.D. as PCP - General (Family Medicine)  Rupali Dill R.N.      Social History     Tobacco Use    Smoking status: Never    Smokeless tobacco: Never   Substance Use Topics    Alcohol use: No    Drug use: No     Family History   Problem Relation Age of Onset    Heart Disease Mother     Cancer Mother     Heart Disease Father     Cancer Father     Heart Disease Sister     Heart Attack Sister     Heart Disease Brother     Heart Attack Brother     Heart Disease Brother     Heart Attack Brother     Diabetes Child     Psychiatric Illness Child         hodgkins survivor     She  has a past medical history of Autoimmune hepatitis (HCC), B12 deficiency, Congenital malrotation of intestine, Fatigue (5/22/2013), Other chest pain (4/19/2013), Paroxysmal supraventricular tachycardia, details unknow - dx 3-5 y ago (4/19/2013),  "and Thyroid disease.   Past Surgical History:   Procedure Laterality Date    LUMPECTOMY  1982    right breast    ABDOMINAL HYSTERECTOMY TOTAL  1970    HERNIA REPAIR  1970    umbilical    TONSILLECTOMY  1954    APPENDECTOMY  1950    OTHER ABDOMINAL SURGERY  1965-66    congenital malrotation of intestines    PRIMARY C SECTION  1968/1970       ROS:    All positives noted in HPI. All others reviewed and are negative.    Ostomy or other tubes or amputations: no  Chronic oxygen use: no  Last eye exam: UTD per report  : denies urinary incontinence; does not interfere with ADLs/ sleep  Gait: stable  Problems with balance/ difficulty walking: no  Hearing: adequate  Dentition: adequate    Lab results 6/10/24 reviewed with patient at visit today.     Exam:   /62 (BP Location: Left arm, Patient Position: Sitting, BP Cuff Size: Adult)   Pulse 78   Temp 36.6 °C (97.9 °F) (Temporal)   Resp 17   Ht 1.6 m (5' 3\")   Wt 53.5 kg (118 lb)   SpO2 98%  Body mass index is 20.9 kg/m².    Gen: Well developed; well nourished; no acute distress; age appropriate appearance   HEENT: Normocephalic; atraumatic; PEERLA b/l; sclera clear b/l; b/l external auditory canals WNL; b/l TM WNL; nares patent; oropharynx clear; oral mucosa moist; tongue midline; dentition adequate   Neck: No adenopathy; no thyromegaly  CV: Regular rate and rhythm; S1/ S2 present; no murmur, gallop or rub noted  Pulm: No respiratory distress; clear to ascultation b/l; no wheezing or stridor noted b/l  Abd: Adequate bowel sounds noted; soft and nontender; no rebound, rigidity, nor distention  Extremities: No peripheral edema b/l LE extremities/ no clubbing nor cyanosis noted  Skin: Warm and dry; no rashes noted   Neuro: No focal deficits noted; pt is able to get up out of chair unassisted and walk forward  Psych: AAOx4; mood and affect are appropriate    Assessment and Plan:  1. Acquired hypothyroidism  TFTs uncontrolled as compared to prior lab results. Patient " endorses 100% Synthroid compliance, and recommend patient continue daily Synthroid use/repeat TFTs in one month for ongoing management. New RX sent to pharmacy.   - TSH; Future  - FREE THYROXINE; Future  - SYNTHROID 125 MCG Tab; Take 1 Tablet by mouth every morning on an empty stomach.  Dispense: 90 Tablet; Refill: 3  - Subsequent Annual Wellness Visit - Includes PPPS ()    2. Seasonal allergic rhinitis due to pollen  Stable/ recommend patient continue daily Singulair use, and new RX sent to pharmacy.   - montelukast (SINGULAIR) 10 MG Tab; Take 1 Tablet by mouth every day.  Dispense: 90 Tablet; Refill: 3  - Subsequent Annual Wellness Visit - Includes PPPS ()    3. Generalized OA  Stable/ patient can continue current Voltaren gel use, and new RX sent to pharmacy.   - diclofenac sodium (VOLTAREN) 1 % Gel; Apply 4 gram to affected area twice daily.  Dispense: 800 g; Refill: 3  - Subsequent Annual Wellness Visit - Includes PPPS ()    4. History of postmenopausal HRT  Stable/ patient is well versed about risks versus benefits of ongoing hormone use, and she reports ongoing Estrace use benefits. New RX sent to pharmacy.   - estradiol (ESTRACE) 1 MG Tab; Take 1 Tablet by mouth every day.  Dispense: 90 Tablet; Refill: 3  - Subsequent Annual Wellness Visit - Includes PPPS ()    5. Breast cancer screening by mammogram  Patient will be due for annual screening mammogram July 28, and she will make imaging appointment accordingly.   - MA-SCREENING MAMMO BILAT W/TOMOSYNTHESIS W/CAD; Future  - Subsequent Annual Wellness Visit - Includes PPPS ()    6. Osteopenia of multiple sites  Stable/ recommend patient continue adequate calcium and vitamin D supplementation as well as weight bearing exericise.   - Subsequent Annual Wellness Visit - Includes PPPS ()    7. Autoimmune hepatitis (CMS-HCC)  Stable/ chronic condition managed by Dr. Moy.   - Subsequent Annual Wellness Visit - Includes PPPS  ()    8. Elevated hemoglobin A1c  HgA1c is 5.7% and blood sugar management strategies discussed at visit today. Will follow.   - Subsequent Annual Wellness Visit - Includes PPPS ()    9. Essential hypertension  Stable/ chronic condition managed by [facility].   - Subsequent Annual Wellness Visit - Includes PPPS ()    10. Mitral valve insufficiency, unspecified etiology  Chronic condition managed by [facility].   - Subsequent Annual Wellness Visit - Includes PPPS ()    11. Excessive vitamin B12 intake  Current B12 level is 2576 (6/10/24) and patient has discontinued all B12 containing supplementation.   - Subsequent Annual Wellness Visit - Includes PPPS ()    12. Encounter for Medicare annual wellness exam  Patient remains well informed about medical conditions that need additional attention moving forward.   - Subsequent Annual Wellness Visit - Includes PPPS ()       Services needed: no new services needed at this time  Health Care Screening: recommendations as per orders if indicated.  Referrals offered: none  Counseling provided today:  Prevent falls and reduce trip hazards; Secure or remove rugs if present   Maintain working fire alarm and carbon monoxide detectors   Engage in regular physical activity daily and social activities weekly as tolerated   F/U with me quarterly/PRN as new needs arise

## 2024-07-24 ENCOUNTER — NON-PROVIDER VISIT (OUTPATIENT)
Dept: INTERNAL MEDICINE | Facility: IMAGING CENTER | Age: 79
End: 2024-07-24
Payer: COMMERCIAL

## 2024-07-24 ENCOUNTER — HOSPITAL ENCOUNTER (OUTPATIENT)
Facility: MEDICAL CENTER | Age: 79
End: 2024-07-24
Attending: FAMILY MEDICINE
Payer: COMMERCIAL

## 2024-07-24 DIAGNOSIS — E03.9 ACQUIRED HYPOTHYROIDISM: Chronic | ICD-10-CM

## 2024-07-24 LAB
T4 FREE SERPL-MCNC: 1.7 NG/DL (ref 0.93–1.7)
TSH SERPL-ACNC: 0.65 UIU/ML (ref 0.35–5.5)

## 2024-07-24 PROCEDURE — 84439 ASSAY OF FREE THYROXINE: CPT

## 2024-07-24 PROCEDURE — 84443 ASSAY THYROID STIM HORMONE: CPT

## 2024-07-24 PROCEDURE — 99999 PR NO CHARGE: CPT

## 2024-07-29 ENCOUNTER — APPOINTMENT (OUTPATIENT)
Dept: RADIOLOGY | Facility: MEDICAL CENTER | Age: 79
End: 2024-07-29
Attending: FAMILY MEDICINE
Payer: COMMERCIAL

## 2024-07-29 DIAGNOSIS — Z12.31 BREAST CANCER SCREENING BY MAMMOGRAM: ICD-10-CM

## 2024-07-29 PROCEDURE — 77067 SCR MAMMO BI INCL CAD: CPT

## 2024-10-19 DIAGNOSIS — I10 ESSENTIAL HYPERTENSION: ICD-10-CM

## 2024-10-19 DIAGNOSIS — I47.10 PAROXYSMAL SUPRAVENTRICULAR TACHYCARDIA (HCC): ICD-10-CM

## 2024-10-21 RX ORDER — LOSARTAN POTASSIUM 25 MG/1
25 TABLET ORAL DAILY
Qty: 90 TABLET | Refills: 2 | Status: SHIPPED | OUTPATIENT
Start: 2024-10-21

## 2024-11-08 ENCOUNTER — HOSPITAL ENCOUNTER (OUTPATIENT)
Facility: MEDICAL CENTER | Age: 79
End: 2024-11-08
Attending: INTERNAL MEDICINE
Payer: COMMERCIAL

## 2024-11-08 ENCOUNTER — NON-PROVIDER VISIT (OUTPATIENT)
Dept: INTERNAL MEDICINE | Facility: IMAGING CENTER | Age: 79
End: 2024-11-08
Payer: COMMERCIAL

## 2024-11-08 LAB
ALBUMIN SERPL BCP-MCNC: 4.2 G/DL (ref 3.2–4.9)
ALBUMIN/GLOB SERPL: 1.1 G/DL
ALP SERPL-CCNC: 71 U/L (ref 30–99)
ALT SERPL-CCNC: 14 U/L (ref 2–50)
ANION GAP SERPL CALC-SCNC: 11 MMOL/L (ref 7–16)
AST SERPL-CCNC: 27 U/L (ref 12–45)
BASOPHILS # BLD AUTO: 1.3 % (ref 0–1.8)
BASOPHILS # BLD: 0.06 K/UL (ref 0–0.12)
BILIRUB SERPL-MCNC: 0.5 MG/DL (ref 0.1–1.5)
BUN SERPL-MCNC: 20 MG/DL (ref 8–22)
CALCIUM ALBUM COR SERPL-MCNC: 10.2 MG/DL (ref 8.5–10.5)
CALCIUM SERPL-MCNC: 10.4 MG/DL (ref 8.5–10.5)
CHLORIDE SERPL-SCNC: 103 MMOL/L (ref 96–112)
CO2 SERPL-SCNC: 26 MMOL/L (ref 20–33)
CREAT SERPL-MCNC: 0.89 MG/DL (ref 0.5–1.4)
EOSINOPHIL # BLD AUTO: 0.2 K/UL (ref 0–0.51)
EOSINOPHIL NFR BLD: 4.2 % (ref 0–6.9)
ERYTHROCYTE [DISTWIDTH] IN BLOOD BY AUTOMATED COUNT: 43.5 FL (ref 35.9–50)
GFR SERPLBLD CREATININE-BSD FMLA CKD-EPI: 66 ML/MIN/1.73 M 2
GLOBULIN SER CALC-MCNC: 3.8 G/DL (ref 1.9–3.5)
GLUCOSE SERPL-MCNC: 76 MG/DL (ref 65–99)
HCT VFR BLD AUTO: 48.2 % (ref 37–47)
HGB BLD-MCNC: 15.8 G/DL (ref 12–16)
IMM GRANULOCYTES # BLD AUTO: 0.01 K/UL (ref 0–0.11)
IMM GRANULOCYTES NFR BLD AUTO: 0.2 % (ref 0–0.9)
LYMPHOCYTES # BLD AUTO: 0.6 K/UL (ref 1–4.8)
LYMPHOCYTES NFR BLD: 12.5 % (ref 22–41)
MCH RBC QN AUTO: 33 PG (ref 27–33)
MCHC RBC AUTO-ENTMCNC: 32.8 G/DL (ref 32.2–35.5)
MCV RBC AUTO: 100.6 FL (ref 81.4–97.8)
MONOCYTES # BLD AUTO: 0.56 K/UL (ref 0–0.85)
MONOCYTES NFR BLD AUTO: 11.7 % (ref 0–13.4)
NEUTROPHILS # BLD AUTO: 3.37 K/UL (ref 1.82–7.42)
NEUTROPHILS NFR BLD: 70.1 % (ref 44–72)
NRBC # BLD AUTO: 0 K/UL
NRBC BLD-RTO: 0 /100 WBC (ref 0–0.2)
PLATELET # BLD AUTO: 211 K/UL (ref 164–446)
PMV BLD AUTO: 11 FL (ref 9–12.9)
POTASSIUM SERPL-SCNC: 4.4 MMOL/L (ref 3.6–5.5)
PROT SERPL-MCNC: 8 G/DL (ref 6–8.2)
RBC # BLD AUTO: 4.79 M/UL (ref 4.2–5.4)
SODIUM SERPL-SCNC: 140 MMOL/L (ref 135–145)
WBC # BLD AUTO: 4.8 K/UL (ref 4.8–10.8)

## 2024-11-08 PROCEDURE — 80053 COMPREHEN METABOLIC PANEL: CPT

## 2024-11-08 PROCEDURE — 99999 PR NO CHARGE: CPT

## 2024-11-08 PROCEDURE — 82105 ALPHA-FETOPROTEIN SERUM: CPT

## 2024-11-08 PROCEDURE — 85025 COMPLETE CBC W/AUTO DIFF WBC: CPT

## 2024-11-11 LAB — AFP-TM SERPL-MCNC: 4 NG/ML (ref 0–9)

## 2025-01-10 ENCOUNTER — OFFICE VISIT (OUTPATIENT)
Dept: CARDIOLOGY | Facility: MEDICAL CENTER | Age: 80
End: 2025-01-10
Attending: INTERNAL MEDICINE
Payer: COMMERCIAL

## 2025-01-10 VITALS
BODY MASS INDEX: 21.62 KG/M2 | HEIGHT: 63 IN | DIASTOLIC BLOOD PRESSURE: 60 MMHG | SYSTOLIC BLOOD PRESSURE: 110 MMHG | RESPIRATION RATE: 16 BRPM | OXYGEN SATURATION: 93 % | HEART RATE: 79 BPM | WEIGHT: 122 LBS

## 2025-01-10 DIAGNOSIS — I47.10 PAROXYSMAL SUPRAVENTRICULAR TACHYCARDIA (HCC): ICD-10-CM

## 2025-01-10 DIAGNOSIS — I34.0 NONRHEUMATIC MITRAL VALVE REGURGITATION: ICD-10-CM

## 2025-01-10 DIAGNOSIS — I10 ESSENTIAL HYPERTENSION: ICD-10-CM

## 2025-01-10 PROCEDURE — 99213 OFFICE O/P EST LOW 20 MIN: CPT | Performed by: INTERNAL MEDICINE

## 2025-01-10 PROCEDURE — 3078F DIAST BP <80 MM HG: CPT | Performed by: INTERNAL MEDICINE

## 2025-01-10 PROCEDURE — 3074F SYST BP LT 130 MM HG: CPT | Performed by: INTERNAL MEDICINE

## 2025-01-10 RX ORDER — DILTIAZEM HYDROCHLORIDE 120 MG/1
120 CAPSULE, COATED, EXTENDED RELEASE ORAL DAILY
Qty: 100 CAPSULE | Refills: 3 | Status: SHIPPED | OUTPATIENT
Start: 2025-01-10

## 2025-01-10 ASSESSMENT — FIBROSIS 4 INDEX: FIB4 SCORE: 2.67

## 2025-01-10 NOTE — PROGRESS NOTES
Chief Complaint   Patient presents with    Tachycardia     F/V Dx: Paroxysmal supraventricular tachycardia, details unknow - dx 3-5 y ago      Hypertension     F/V Dx: Essential Hypertension        Subjective     She Davis is a 79 y.o. female who presents today for follow-up of PSVT well-controlled on CCB for many years.  She has some mild to moderate mitral insufficiency as well which has been stable.    Doing well since last visit asymptomatic most recent echocardiogram showed improvement of MR to trace to mild.  Medications are appropriate blood pressure is good.    Past Medical History:   Diagnosis Date    Autoimmune hepatitis (HCC)     B12 deficiency     Congenital malrotation of intestine     Fatigue 5/22/2013    Other chest pain 4/19/2013    Paroxysmal supraventricular tachycardia, details unknow - dx 3-5 y ago 4/19/2013    Thyroid disease      Past Surgical History:   Procedure Laterality Date    LUMPECTOMY  1982    right breast    ABDOMINAL HYSTERECTOMY TOTAL  1970    HERNIA REPAIR  1970    umbilical    TONSILLECTOMY  1954    APPENDECTOMY  1950    OTHER ABDOMINAL SURGERY  1965-66    congenital malrotation of intestines    PRIMARY C SECTION  1968/1970     Family History   Problem Relation Age of Onset    Heart Disease Mother     Breast Cancer Mother     Heart Disease Father     Cancer Father     Heart Disease Sister     Heart Attack Sister     Heart Disease Brother     Heart Attack Brother     Heart Disease Brother     Heart Attack Brother     Diabetes Child     Psychiatric Illness Child         hodgkins survivor     Social History     Socioeconomic History    Marital status:      Spouse name: Not on file    Number of children: Not on file    Years of education: Not on file    Highest education level: Not on file   Occupational History    Not on file   Tobacco Use    Smoking status: Never    Smokeless tobacco: Never   Substance and Sexual Activity    Alcohol use: No    Drug use: No    Sexual  activity: Not Currently     Partners: Male   Other Topics Concern    Not on file   Social History Narrative    Not on file     Social Drivers of Health     Financial Resource Strain: Not on file   Food Insecurity: Not on file   Transportation Needs: Not on file   Physical Activity: Not on file   Stress: Not on file   Social Connections: Not on file   Intimate Partner Violence: Not on file   Housing Stability: Not on file     Allergies   Allergen Reactions    Statins [Hmg-Coa-R Inhibitors]      Autoimmune hepatitis    Chlordiazepoxide Hcl      Causes hyperactivity    Codeine     Hydrocodone     Librium     Lopressor [Kdc:Ci Pigment Blue 63+Metoprolol]      Does not tolerate beta blockers    Sulphadimidine Sodium [Sulfamethazine Sodium]      Outpatient Encounter Medications as of 1/10/2025   Medication Sig Dispense Refill    DILTIAZem CD (CARTIA XT) 120 MG CAPSULE SR 24 HR Take 1 Capsule by mouth every day. 100 Capsule 3    losartan (COZAAR) 25 MG Tab Take 1 Tablet by mouth every day. 90 Tablet 2    montelukast (SINGULAIR) 10 MG Tab Take 1 Tablet by mouth every day. 90 Tablet 3    diclofenac sodium (VOLTAREN) 1 % Gel Apply 4 gram to affected area twice daily. 800 g 3    estradiol (ESTRACE) 1 MG Tab Take 1 Tablet by mouth every day. 90 Tablet 3    Cholecalciferol (VITAMIN D3 PO) Take 2,000 mg by mouth.      CALCIUM CARB-CHOLECALCIFEROL PO Take 1 Dose by mouth every day. Calcium 1000 mg/ Vitamin D 2000 IU daily      Azathioprine 75 MG Tab Take 1 Tab by mouth every day. (Patient taking differently: Take 75 mg by mouth every day. 50 mg and 75 mg alternating dosages) 90 Tab 4    aspirin EC (ECOTRIN) 81 MG Tablet Delayed Response Take 1 Tab by mouth every day. 90 Tab 4    fexofenadine (ALLEGRA) 180 MG tablet Take 180 mg by mouth 1 time daily as needed. Indications: Hayfever      docosahexanoic acid (OMEGA 3 FA) 1000 MG CAPS Take 1,000 mg by mouth every day.      SYNTHROID 125 MCG Tab Take 1 Tablet by mouth every morning on  "an empty stomach. 90 Tablet 3    [DISCONTINUED] DILTIAZem CD (CARTIA XT) 120 MG CAPSULE SR 24 HR Take 1 Capsule by mouth every day. 100 Capsule 2    azaTHIOprine (IMURAN) 50 MG Tab 50 mg and 75 mg every other day alternating       No facility-administered encounter medications on file as of 1/10/2025.     Review of Systems   All other systems reviewed and are negative.             Objective     /60 (BP Location: Left arm, Patient Position: Sitting, BP Cuff Size: Adult)   Pulse 79   Resp 16   Ht 1.6 m (5' 3\")   Wt 55.3 kg (122 lb)   SpO2 93%   BMI 21.61 kg/m²     Physical Exam  Vitals reviewed.   Constitutional:       General: She is not in acute distress.     Appearance: She is well-developed. She is not diaphoretic.   HENT:      Head: Normocephalic and atraumatic.      Right Ear: External ear normal.      Left Ear: External ear normal.      Mouth/Throat:      Pharynx: No oropharyngeal exudate.   Eyes:      General: No scleral icterus.        Right eye: No discharge.         Left eye: No discharge.      Conjunctiva/sclera: Conjunctivae normal.      Pupils: Pupils are equal, round, and reactive to light.   Neck:      Thyroid: No thyromegaly.      Vascular: No JVD.      Trachea: No tracheal deviation.   Cardiovascular:      Rate and Rhythm: Normal rate and regular rhythm.      Chest Wall: PMI is not displaced.      Pulses:           Carotid pulses are 2+ on the right side and 2+ on the left side.       Radial pulses are 2+ on the right side and 2+ on the left side.        Popliteal pulses are 2+ on the right side and 2+ on the left side.        Dorsalis pedis pulses are 2+ on the right side and 2+ on the left side.        Posterior tibial pulses are 2+ on the right side and 2+ on the left side.      Heart sounds: Normal heart sounds, S1 normal and S2 normal. No murmur heard.     No friction rub. No gallop. No S3 or S4 sounds.   Pulmonary:      Effort: Pulmonary effort is normal. No respiratory distress.    "   Breath sounds: Normal breath sounds. No wheezing or rales.   Chest:      Chest wall: No tenderness.   Abdominal:      General: Bowel sounds are normal. There is no distension.      Palpations: Abdomen is soft.      Tenderness: There is no abdominal tenderness.   Musculoskeletal:         General: No tenderness. Normal range of motion.      Cervical back: Normal range of motion and neck supple.   Skin:     General: Skin is warm and dry.      Findings: No erythema or rash.   Neurological:      Mental Status: She is alert and oriented to person, place, and time.      Cranial Nerves: No cranial nerve deficit (Cranial nerves II through XII grossly intact).   Psychiatric:         Behavior: Behavior normal.         Thought Content: Thought content normal.         Judgment: Judgment normal.       LABS:  Lab Results   Component Value Date/Time    CHOLSTRLTOT 209 (H) 06/10/2024 10:15 AM     (H) 06/10/2024 10:15 AM    HDL 81 06/10/2024 10:15 AM    TRIGLYCERIDE 60 06/10/2024 10:15 AM       Lab Results   Component Value Date/Time    WBC 4.8 11/08/2024 08:45 AM    RBC 4.79 11/08/2024 08:45 AM    HEMOGLOBIN 15.8 11/08/2024 08:45 AM    HEMATOCRIT 48.2 (H) 11/08/2024 08:45 AM    .6 (H) 11/08/2024 08:45 AM    NEUTSPOLYS 70.10 11/08/2024 08:45 AM    LYMPHOCYTES 12.50 (L) 11/08/2024 08:45 AM    MONOCYTES 11.70 11/08/2024 08:45 AM    EOSINOPHILS 4.20 11/08/2024 08:45 AM    BASOPHILS 1.30 11/08/2024 08:45 AM     Lab Results   Component Value Date/Time    SODIUM 140 11/08/2024 08:45 AM    POTASSIUM 4.4 11/08/2024 08:45 AM    CHLORIDE 103 11/08/2024 08:45 AM    CO2 26 11/08/2024 08:45 AM    GLUCOSE 76 11/08/2024 08:45 AM    BUN 20 11/08/2024 08:45 AM    CREATININE 0.89 11/08/2024 08:45 AM    BUNCREATRAT 18 06/02/2023 06:15 AM     Lab Results   Component Value Date    HBA1C 5.7 (H) 06/10/2024      Lab Results   Component Value Date/Time    ALKPHOSPHAT 71 11/08/2024 08:45 AM    ASTSGOT 27 11/08/2024 08:45 AM    ALTSGPT 14  "11/08/2024 08:45 AM    TBILIRUBIN 0.5 11/08/2024 08:45 AM      No results found for: \"BNPBTYPENAT\"   Lab Results   Component Value Date/Time    TSH 0.923 06/02/2023 06:15 AM     Lab Results   Component Value Date/Time    PROTHROMBTM 12.8 04/26/2022 09:20 AM    INR 0.99 04/26/2022 09:20 AM      Imaging reviewed including echocardiogram as detailed above           Assessment & Plan     1. Essential hypertension  DILTIAZem CD (CARTIA XT) 120 MG CAPSULE SR 24 HR      2. Paroxysmal supraventricular tachycardia (HCC)  DILTIAZem CD (CARTIA XT) 120 MG CAPSULE SR 24 HR      3. Nonrheumatic mitral valve regurgitation            Medical Decision Making: Today's Assessment/Status/Plan:          Doing well no significant palpitations diltiazem refilled at current dose without change no other medical changes.  Healthful dietary lifestyle choices discussed.  She will follow-up routinely.  "

## 2025-03-09 ENCOUNTER — PATIENT MESSAGE (OUTPATIENT)
Dept: CARDIOLOGY | Facility: MEDICAL CENTER | Age: 80
End: 2025-03-09
Payer: COMMERCIAL

## 2025-03-09 DIAGNOSIS — I47.10 PAROXYSMAL SUPRAVENTRICULAR TACHYCARDIA (HCC): ICD-10-CM

## 2025-03-09 DIAGNOSIS — I10 ESSENTIAL HYPERTENSION: ICD-10-CM

## 2025-03-10 RX ORDER — DILTIAZEM HYDROCHLORIDE 120 MG/1
120 CAPSULE, COATED, EXTENDED RELEASE ORAL DAILY
Qty: 90 CAPSULE | Refills: 4 | Status: SHIPPED | OUTPATIENT
Start: 2025-03-10

## 2025-03-10 RX ORDER — LOSARTAN POTASSIUM 25 MG/1
25 TABLET ORAL DAILY
Qty: 90 TABLET | Refills: 4 | Status: SHIPPED | OUTPATIENT
Start: 2025-03-10

## 2025-05-30 DIAGNOSIS — E03.9 ACQUIRED HYPOTHYROIDISM: Chronic | ICD-10-CM

## 2025-05-30 DIAGNOSIS — J30.1 SEASONAL ALLERGIC RHINITIS DUE TO POLLEN: ICD-10-CM

## 2025-05-30 DIAGNOSIS — Z92.29 HISTORY OF POSTMENOPAUSAL HRT: ICD-10-CM

## 2025-05-30 RX ORDER — ESTRADIOL 1 MG/1
1 TABLET ORAL DAILY
Qty: 90 TABLET | Refills: 0 | Status: SHIPPED | OUTPATIENT
Start: 2025-05-30

## 2025-05-30 RX ORDER — MONTELUKAST SODIUM 10 MG/1
10 TABLET ORAL DAILY
Qty: 90 TABLET | Refills: 0 | Status: SHIPPED | OUTPATIENT
Start: 2025-05-30

## 2025-05-30 RX ORDER — LEVOTHYROXINE SODIUM 125 MCG
125 TABLET ORAL
Qty: 90 TABLET | Refills: 0 | Status: SHIPPED | OUTPATIENT
Start: 2025-05-30

## 2025-06-16 DIAGNOSIS — E53.8 FOLATE DEFICIENCY: ICD-10-CM

## 2025-06-16 DIAGNOSIS — E78.2 MIXED DYSLIPIDEMIA: ICD-10-CM

## 2025-06-16 DIAGNOSIS — E55.9 VITAMIN D DEFICIENCY: ICD-10-CM

## 2025-06-16 DIAGNOSIS — E53.8 B12 DEFICIENCY: ICD-10-CM

## 2025-06-16 DIAGNOSIS — E03.9 ACQUIRED HYPOTHYROIDISM: Primary | Chronic | ICD-10-CM

## 2025-06-16 DIAGNOSIS — R73.9 HYPERGLYCEMIA: ICD-10-CM

## 2025-06-16 DIAGNOSIS — I10 ESSENTIAL HYPERTENSION: ICD-10-CM

## 2025-06-18 ENCOUNTER — HOSPITAL ENCOUNTER (OUTPATIENT)
Facility: MEDICAL CENTER | Age: 80
End: 2025-06-18
Attending: FAMILY MEDICINE
Payer: COMMERCIAL

## 2025-06-18 ENCOUNTER — NON-PROVIDER VISIT (OUTPATIENT)
Dept: INTERNAL MEDICINE | Facility: IMAGING CENTER | Age: 80
End: 2025-06-18
Payer: COMMERCIAL

## 2025-06-18 ENCOUNTER — HOSPITAL ENCOUNTER (OUTPATIENT)
Facility: MEDICAL CENTER | Age: 80
End: 2025-06-18
Attending: INTERNAL MEDICINE
Payer: COMMERCIAL

## 2025-06-18 DIAGNOSIS — E78.2 MIXED DYSLIPIDEMIA: ICD-10-CM

## 2025-06-18 DIAGNOSIS — E03.9 ACQUIRED HYPOTHYROIDISM: Chronic | ICD-10-CM

## 2025-06-18 DIAGNOSIS — E53.8 FOLATE DEFICIENCY: ICD-10-CM

## 2025-06-18 DIAGNOSIS — E55.9 VITAMIN D DEFICIENCY: ICD-10-CM

## 2025-06-18 DIAGNOSIS — E53.8 B12 DEFICIENCY: ICD-10-CM

## 2025-06-18 DIAGNOSIS — R73.9 HYPERGLYCEMIA: ICD-10-CM

## 2025-06-18 LAB
25(OH)D3 SERPL-MCNC: 59 NG/ML (ref 30–100)
ALBUMIN SERPL BCP-MCNC: 4.1 G/DL (ref 3.2–4.9)
ALBUMIN/GLOB SERPL: 1.2 G/DL
ALP SERPL-CCNC: 68 U/L (ref 30–99)
ALT SERPL-CCNC: 19 U/L (ref 2–50)
ANION GAP SERPL CALC-SCNC: 10 MMOL/L (ref 7–16)
AST SERPL-CCNC: 31 U/L (ref 12–45)
BASOPHILS # BLD AUTO: 1.3 % (ref 0–1.8)
BASOPHILS # BLD: 0.05 K/UL (ref 0–0.12)
BILIRUB SERPL-MCNC: 0.8 MG/DL (ref 0.1–1.5)
BUN SERPL-MCNC: 15 MG/DL (ref 8–22)
CALCIUM ALBUM COR SERPL-MCNC: 9.3 MG/DL (ref 8.5–10.5)
CALCIUM SERPL-MCNC: 9.4 MG/DL (ref 8.5–10.5)
CHLORIDE SERPL-SCNC: 105 MMOL/L (ref 96–112)
CHOLEST SERPL-MCNC: 217 MG/DL (ref 100–199)
CO2 SERPL-SCNC: 24 MMOL/L (ref 20–33)
CREAT SERPL-MCNC: 0.76 MG/DL (ref 0.5–1.4)
EOSINOPHIL # BLD AUTO: 0.27 K/UL (ref 0–0.51)
EOSINOPHIL NFR BLD: 7 % (ref 0–6.9)
ERYTHROCYTE [DISTWIDTH] IN BLOOD BY AUTOMATED COUNT: 44 FL (ref 35.9–50)
FOLATE SERPL-MCNC: 14.5 NG/ML
GFR SERPLBLD CREATININE-BSD FMLA CKD-EPI: 80 ML/MIN/1.73 M 2
GLOBULIN SER CALC-MCNC: 3.4 G/DL (ref 1.9–3.5)
GLUCOSE SERPL-MCNC: 85 MG/DL (ref 65–99)
HCT VFR BLD AUTO: 43.8 % (ref 37–47)
HDLC SERPL-MCNC: 86 MG/DL
HGB BLD-MCNC: 14.2 G/DL (ref 12–16)
IMM GRANULOCYTES # BLD AUTO: 0 K/UL (ref 0–0.11)
IMM GRANULOCYTES NFR BLD AUTO: 0 % (ref 0–0.9)
LDLC SERPL CALC-MCNC: 119 MG/DL
LYMPHOCYTES # BLD AUTO: 0.65 K/UL (ref 1–4.8)
LYMPHOCYTES NFR BLD: 16.8 % (ref 22–41)
MCH RBC QN AUTO: 32.7 PG (ref 27–33)
MCHC RBC AUTO-ENTMCNC: 32.4 G/DL (ref 32.2–35.5)
MCV RBC AUTO: 100.9 FL (ref 81.4–97.8)
MONOCYTES # BLD AUTO: 0.43 K/UL (ref 0–0.85)
MONOCYTES NFR BLD AUTO: 11.1 % (ref 0–13.4)
NEUTROPHILS # BLD AUTO: 2.46 K/UL (ref 1.82–7.42)
NEUTROPHILS NFR BLD: 63.8 % (ref 44–72)
NRBC # BLD AUTO: 0 K/UL
NRBC BLD-RTO: 0 /100 WBC (ref 0–0.2)
PLATELET # BLD AUTO: 151 K/UL (ref 164–446)
PMV BLD AUTO: 12.2 FL (ref 9–12.9)
POTASSIUM SERPL-SCNC: 4.2 MMOL/L (ref 3.6–5.5)
PROT SERPL-MCNC: 7.5 G/DL (ref 6–8.2)
RBC # BLD AUTO: 4.34 M/UL (ref 4.2–5.4)
SODIUM SERPL-SCNC: 139 MMOL/L (ref 135–145)
T4 FREE SERPL-MCNC: 1.79 NG/DL (ref 0.93–1.7)
TRIGL SERPL-MCNC: 62 MG/DL (ref 0–149)
TSH SERPL-ACNC: 0.5 UIU/ML (ref 0.38–5.33)
VIT B12 SERPL-MCNC: 340 PG/ML (ref 211–911)
WBC # BLD AUTO: 3.9 K/UL (ref 4.8–10.8)

## 2025-06-18 PROCEDURE — 84439 ASSAY OF FREE THYROXINE: CPT

## 2025-06-18 PROCEDURE — 84443 ASSAY THYROID STIM HORMONE: CPT

## 2025-06-18 PROCEDURE — 82306 VITAMIN D 25 HYDROXY: CPT

## 2025-06-18 PROCEDURE — 80061 LIPID PANEL: CPT

## 2025-06-18 PROCEDURE — 82607 VITAMIN B-12: CPT

## 2025-06-18 PROCEDURE — 85025 COMPLETE CBC W/AUTO DIFF WBC: CPT

## 2025-06-18 PROCEDURE — 80053 COMPREHEN METABOLIC PANEL: CPT

## 2025-06-18 PROCEDURE — 82105 ALPHA-FETOPROTEIN SERUM: CPT

## 2025-06-18 PROCEDURE — 83036 HEMOGLOBIN GLYCOSYLATED A1C: CPT

## 2025-06-18 PROCEDURE — 82746 ASSAY OF FOLIC ACID SERUM: CPT

## 2025-06-19 ENCOUNTER — RESULTS FOLLOW-UP (OUTPATIENT)
Dept: INTERNAL MEDICINE | Facility: IMAGING CENTER | Age: 80
End: 2025-06-19

## 2025-06-19 LAB
EST. AVERAGE GLUCOSE BLD GHB EST-MCNC: 114 MG/DL
HBA1C MFR BLD: 5.6 % (ref 4–5.6)

## 2025-06-20 LAB — AFP-TM SERPL-MCNC: 4 NG/ML (ref 0–9)

## 2025-06-25 ENCOUNTER — OFFICE VISIT (OUTPATIENT)
Dept: INTERNAL MEDICINE | Facility: IMAGING CENTER | Age: 80
End: 2025-06-25
Payer: COMMERCIAL

## 2025-06-25 VITALS
DIASTOLIC BLOOD PRESSURE: 58 MMHG | HEART RATE: 77 BPM | HEIGHT: 63 IN | OXYGEN SATURATION: 94 % | SYSTOLIC BLOOD PRESSURE: 120 MMHG | TEMPERATURE: 97.8 F | BODY MASS INDEX: 20.91 KG/M2 | WEIGHT: 118 LBS | RESPIRATION RATE: 17 BRPM

## 2025-06-25 DIAGNOSIS — E03.9 ACQUIRED HYPOTHYROIDISM: Primary | Chronic | ICD-10-CM

## 2025-06-25 DIAGNOSIS — Z00.00 ENCOUNTER FOR MEDICARE ANNUAL WELLNESS EXAM: ICD-10-CM

## 2025-06-25 DIAGNOSIS — I10 ESSENTIAL HYPERTENSION: ICD-10-CM

## 2025-06-25 DIAGNOSIS — I34.0 NONRHEUMATIC MITRAL VALVE REGURGITATION: Chronic | ICD-10-CM

## 2025-06-25 DIAGNOSIS — I47.10 PAROXYSMAL SUPRAVENTRICULAR TACHYCARDIA (HCC): Chronic | ICD-10-CM

## 2025-06-25 DIAGNOSIS — M15.9 GENERALIZED OA: ICD-10-CM

## 2025-06-25 DIAGNOSIS — K75.4 AUTOIMMUNE HEPATITIS (HCC): Chronic | ICD-10-CM

## 2025-06-25 DIAGNOSIS — M85.89 OSTEOPENIA OF MULTIPLE SITES: Chronic | ICD-10-CM

## 2025-06-25 DIAGNOSIS — Z12.31 BREAST CANCER SCREENING BY MAMMOGRAM: ICD-10-CM

## 2025-06-25 PROBLEM — R73.09 ELEVATED HEMOGLOBIN A1C: Status: RESOLVED | Noted: 2018-09-26 | Resolved: 2025-06-25

## 2025-06-25 ASSESSMENT — ENCOUNTER SYMPTOMS: GENERAL WELL-BEING: GOOD

## 2025-06-25 ASSESSMENT — FIBROSIS 4 INDEX: FIB4 SCORE: 3.67

## 2025-06-25 ASSESSMENT — ACTIVITIES OF DAILY LIVING (ADL): BATHING_REQUIRES_ASSISTANCE: 0

## 2025-06-25 ASSESSMENT — PATIENT HEALTH QUESTIONNAIRE - PHQ9: CLINICAL INTERPRETATION OF PHQ2 SCORE: 0

## 2025-06-25 NOTE — PROGRESS NOTES
CC:   Medicare Annual Wellness Visit    HPI:  Angela is a 79 y.o. female here for her Medicare Annual Wellness Visit and to review labs done 6/18/25. Her  is present today at the appointment, and She reports feeling well overall at this time.     Patient Active Problem List    Diagnosis Date Noted    Generalized OA 04/12/2019    Essential hypertension 02/12/2018    Osteopenia of multiple sites 05/17/2017    Acquired hypothyroidism 05/17/2017    Autoimmune hepatitis (CMS-HCC) 10/10/2014    Mitral regurgitation 10/10/2014    Paroxysmal supraventricular tachycardia, details unknow - dx 3-5 y ago 04/19/2013     Current medications: see MAR    Current supplements: see MAR  Chronic narcotic pain medicines: no  Allergies: Statins [hmg-coa-r inhibitors], Chlordiazepoxide hcl, Codeine, Hydrocodone, Librium, Lopressor [kdc:ci pigment blue 63+metoprolol], and Sulphadimidine sodium [sulfamethazine sodium]  Exercise: as able  Current social contact/activities: limited by choice  Current mood: good  Advance Directive on file: yes    Screening:  Depression Screening  Little interest or pleasure in doing things?  0 - not at all  Feeling down, depressed , or hopeless? 0 - not at all  Patient Health Questionnaire Score: 0     If depressive symptoms identified deferred to follow up visit unless specifically addressed in assessment and plan.    Interpretation of PHQ-9 Total Score   Score Severity   1-4 No Depression   5-9 Mild Depression   10-14 Moderate Depression   15-19 Moderately Severe Depression   20-27 Severe Depression    Screening for Cognitive Impairment  Do you or any of your friends or family members have any concern about your memory? No  Three Minute Recall (Village, Kitchen, Baby) 3/3    [place] clock face with all 12 numbers and set the hands to show 10 minutes past 11.  Yes    Cognitive concerns identified deferred for follow up unless specifically addressed in assessment and plan.    Fall Risk Assessment  Has  the patient had two or more falls in the last year or any fall with injury in the last year?  No    Safety Assessment  Do you always wear your seatbelt?  Yes  Any changes to home needed to function safely? No  Difficulty hearing.  No  Patient counseled about all safety risks that were identified.    Functional Assessment ADLs  Are there any barriers preventing you from cooking for yourself or meeting nutritional needs?  No.    Are there any barriers preventing you from driving safely or obtaining transportation?  No.    Are there any barriers preventing you from using a telephone or calling for help?  No    Are there any barriers preventing you from shopping?  No.    Are there any barriers preventing you from taking care of your own finances?  No    Are there any barriers preventing you from managing your medications?  No    Are there any barriers preventing you from showering, bathing or dressing yourself? No    Are there any barriers preventing you from doing housework or laundry? No  Are there any barriers preventing you from using the toilet?No  Are you currently engaging in any exercise or physical activity?  Yes.      Self-Assessment of Health  What is your perception of your health? Good    Do you sleep more than six hours a night? Yes    In the past 7 days, how much did pain keep you from doing your normal work? None    Do you spend quality time with family or friends (virtually or in person)? Yes    Do you usually eat a heart healthy diet that constists of a variety of fruits, vegetables, whole grains and fiber? Yes    Do you eat foods high in fat and/or Fast Food more than three times per week? No    How concerned are you that your medical conditions are not being well managed? Not at all    Are you worried that in the next 2 months, you may not have stable housing that you own, rent, or stay in as part of a household?        Advance Care Planning  Do you have an Advance Directive, Living Will, Durable  Power of , or POLST?                   Health Maintenance Summary            Awaiting Completion       Mammogram (Yearly) Order placed this encounter      06/25/2025  Order placed for MA-SCREENING MAMMO BILAT W/TOMOSYNTHESIS W/CAD by Enedelia Bourne M.D.    07/29/2024  MA-SCREENING MAMMO BILAT W/TOMOSYNTHESIS W/CAD    07/28/2023  MA-SCREENING MAMMO BILAT W/TOMOSYNTHESIS W/CAD    07/27/2022  MA-SCREENING MAMMO BILAT W/TOMOSYNTHESIS W/CAD    01/13/2020  MA-SCREENING MAMMO BILAT W/TOMOSYNTHESIS W/CAD     Only the first 5 history entries have been loaded, but more history exists.                    Upcoming       Annual Wellness Visit (Yearly) Next due on 6/25/2026 06/25/2025  Subsequent Annual Wellness Visit - Includes PPPS ()    06/25/2025  Visit Dx: Encounter for Medicare annual wellness exam    06/19/2024  Subsequent Annual Wellness Visit - Includes PPPS ()    06/19/2024  Visit Dx: Encounter for Medicare annual wellness exam    06/11/2023  Subsequent Annual Wellness Visit - Includes PPPS ()      Only the first 5 history entries have been loaded, but more history exists.              Bone Density Scan (Every 5 Years) Next due on 7/27/2027 07/27/2022  DS-BONE DENSITY STUDY (DEXA)    09/14/2017  DS-BONE DENSITY STUDY (DEXA)    06/17/2005  DS-BONE DENSITY STUDY (DEXA)              IMM DTaP/Tdap/Td Vaccine (2 - Td or Tdap) Next due on 9/14/2027 09/14/2017  Imm Admin: Tdap Vaccine                      Completed or No Longer Recommended       Influenza Vaccine (Series Information) Completed      10/16/2024  Imm Admin: Influenza, unspecified formulation    10/19/2023  Imm Admin: Influenza Vaccine Adult HD    09/29/2022  Imm Admin: Influenza, Unspecified - HISTORICAL DATA    11/02/2021  Imm Admin: Influenza Vaccine Adult HD    09/30/2020  Imm Admin: Influenza Vaccine Adult HD      Only the first 5 history entries have been loaded, but more history exists.              Pneumococcal  Vaccine: 50+ Years (Series Information) Completed      09/16/2015  Imm Admin: Pneumococcal Conjugate Vaccine (Prevnar/PCV-13)    05/01/2014  Imm Admin: Pneumococcal polysaccharide vaccine (PPSV-23)              HPV Vaccines (Series Information) Aged Out      No completion history exists for this topic.              Polio Vaccine (Inactivated Polio) (Series Information) Aged Out     No completion history exists for this topic.              Meningococcal Immunization (Series Information) Aged Out     No completion history exists for this topic.              Meningococcal B Vaccine (Series Information) Aged Out     No completion history exists for this topic.              Colorectal Cancer Screening  Discontinued        Frequency changed to Never automatically (Topic No Longer Applies)    02/17/2016  REFERRAL TO GI FOR COLONOSCOPY              Hepatitis A Vaccine (Hep A)  Discontinued     No completion history exists for this topic.              Hepatitis B Vaccine (Hep B)  Discontinued     No completion history exists for this topic.              Zoster (Shingles) Vaccines  Discontinued     No completion history exists for this topic.              COVID-19 Vaccine  Discontinued     No completion history exists for this topic.              Hepatitis C Screening  Discontinued     No completion history exists for this topic.                            Patient Care Team:  Enedelia Bourne M.D. as PCP - General (Family Medicine)  Rupali Dill R.N.      Social History[1]  Family History   Problem Relation Age of Onset    Heart Disease Mother     Breast Cancer Mother     Heart Disease Father     Cancer Father     Heart Disease Sister     Heart Attack Sister     Heart Disease Brother     Heart Attack Brother     Heart Disease Brother     Heart Attack Brother     Diabetes Child     Psychiatric Illness Child         hodgkins survivor     She  has a past medical history of Autoimmune hepatitis (HCC), B12 deficiency,  "Congenital malrotation of intestine, Fatigue (5/22/2013), Other chest pain (4/19/2013), Paroxysmal supraventricular tachycardia, details unknow - dx 3-5 y ago (4/19/2013), and Thyroid disease.   Past Surgical History[2]    ROS:    All positives noted in HPI. All others reviewed and are negative.    Ostomy or other tubes or amputations: no  Chronic oxygen use: no  Last eye exam: exam appointment scheduled for Friday  : denies urinary incontinence; does not interfere with ADLs/ sleep  Gait: stable  Problems with balance/ difficulty walking: no  Hearing: adequate   Dentition: adequate    Lab results 6/18/25 reviewed with patient and her  today.     Exam:   /58 (BP Location: Left arm, Patient Position: Sitting, BP Cuff Size: Adult)   Pulse 77   Temp 36.6 °C (97.8 °F) (Temporal)   Resp 17   Ht 1.6 m (5' 3\")   Wt 53.5 kg (118 lb)   SpO2 94%  Body mass index is 20.9 kg/m².    Gen: Well developed; well nourished; no acute distress; age appropriate appearance   HEENT: Normocephalic; atraumatic; PEERLA b/l; sclera clear b/l; b/l external auditory canals WNL; b/l TM WNL; nares patent; oropharynx clear; oral mucosa moist; tongue midline; dentition adequate   Neck: No adenopathy; no thyromegaly  CV: Regular rate and rhythm; S1/ S2 present; no murmur, gallop or rub noted  Pulm: No respiratory distress; clear to ascultation b/l; no wheezing or stridor noted b/l  Abd: Adequate bowel sounds noted; soft and nontender; no rebound, rigidity, nor distention  Extremities: No new peripheral edema b/l LE extremities/ no clubbing nor cyanosis noted  Skin: Warm and dry; no rashes noted   Neuro: No focal deficits noted; pt is able to get up out of chair unassisted and walk forward  Psych: AAOx4; mood and affect are appropriate    Assessment and Plan:  1. Acquired hypothyroidism (Primary)  Stable/ recommend patient continue Synthroid 125 mcg daily use.   - Subsequent Annual Wellness Visit - Includes PPPS ()    2. " Autoimmune hepatitis (CMS-HCC)  Stable/ chronic condition managed by Dr. Moy.   - Subsequent Annual Wellness Visit - Includes PPPS ()    3. Essential hypertension  Stable/ condition managed by [facility].   - Subsequent Annual Wellness Visit - Includes PPPS ()    4. Nonrheumatic mitral valve regurgitation  Stable/ condition managed by [facility].   - Subsequent Annual Wellness Visit - Includes PPPS ()    5. Paroxysmal supraventricular tachycardia, details unknow - dx 3-5 y ago  Not a current concern for patient - managed by [facility].   - Subsequent Annual Wellness Visit - Includes PPPS ()    6. Generalized OA  Stable/ patient can continue current Voltaren gel use   - Subsequent Annual Wellness Visit - Includes PPPS ()    7. Osteopenia of multiple sites  Stable/ recommend patient continue adequate calcium and vitamin D supplementation as well as weight bearing exericise.   - Subsequent Annual Wellness Visit - Includes PPPS ()    8. Breast cancer screening by mammogram  Patient will be due for annual screening mammogram on 7/29/25, and she will make imaging appointment accordingly.   - MA-SCREENING MAMMO BILAT W/TOMOSYNTHESIS W/CAD; Future  - Subsequent Annual Wellness Visit - Includes PPPS ()    9. Encounter for Medicare annual wellness exam  Patient is feeling well overall and remains well informed about medical conditions that need additional attention moving forward.   - Subsequent Annual Wellness Visit - Includes PPPS ()       Services needed: no new services needed at this time  Health Care Screening: recommendations as per orders if indicated.  Referrals offered: none  Counseling provided today:  Prevent falls and reduce trip hazards; Secure or remove rugs if present   Maintain working fire alarm and carbon monoxide detectors   Engage in regular physical activity daily and social activities weekly as tolerated  F/U with me quarterly/PRN sooner  as new needs arise           [1]   Social History  Tobacco Use    Smoking status: Never    Smokeless tobacco: Never   Substance Use Topics    Alcohol use: No    Drug use: No   [2]   Past Surgical History:  Procedure Laterality Date    LUMPECTOMY  1982    right breast    ABDOMINAL HYSTERECTOMY TOTAL  1970    HERNIA REPAIR  1970    umbilical    TONSILLECTOMY  1954    APPENDECTOMY  1950    OTHER ABDOMINAL SURGERY  1965-66    congenital malrotation of intestines    PRIMARY C SECTION  1968/1970

## 2025-08-08 ENCOUNTER — APPOINTMENT (OUTPATIENT)
Dept: RADIOLOGY | Facility: MEDICAL CENTER | Age: 80
End: 2025-08-08
Attending: FAMILY MEDICINE
Payer: COMMERCIAL

## 2025-08-08 VITALS — BODY MASS INDEX: 20.91 KG/M2 | HEIGHT: 63 IN | WEIGHT: 118 LBS

## 2025-08-08 DIAGNOSIS — Z12.31 BREAST CANCER SCREENING BY MAMMOGRAM: ICD-10-CM

## 2025-08-08 PROCEDURE — 77067 SCR MAMMO BI INCL CAD: CPT

## 2025-08-08 ASSESSMENT — FIBROSIS 4 INDEX: FIB4 SCORE: 3.67

## 2025-08-28 DIAGNOSIS — E03.9 ACQUIRED HYPOTHYROIDISM: Chronic | ICD-10-CM

## 2025-08-28 DIAGNOSIS — Z92.29 HISTORY OF POSTMENOPAUSAL HRT: ICD-10-CM

## 2025-08-29 ENCOUNTER — PATIENT MESSAGE (OUTPATIENT)
Dept: INTERNAL MEDICINE | Facility: IMAGING CENTER | Age: 80
End: 2025-08-29
Payer: COMMERCIAL

## 2025-08-29 DIAGNOSIS — J30.1 SEASONAL ALLERGIC RHINITIS DUE TO POLLEN: ICD-10-CM

## 2025-08-29 RX ORDER — ESTRADIOL 1 MG/1
1 TABLET ORAL DAILY
Qty: 90 TABLET | Refills: 3 | Status: SHIPPED | OUTPATIENT
Start: 2025-08-29

## 2025-08-29 RX ORDER — MONTELUKAST SODIUM 10 MG/1
10 TABLET ORAL DAILY
Qty: 90 TABLET | Refills: 3 | Status: SHIPPED | OUTPATIENT
Start: 2025-08-29

## 2025-08-29 RX ORDER — LEVOTHYROXINE SODIUM 125 MCG
125 TABLET ORAL
Qty: 90 TABLET | Refills: 3 | Status: SHIPPED | OUTPATIENT
Start: 2025-08-29